# Patient Record
Sex: FEMALE | Race: WHITE | NOT HISPANIC OR LATINO | Employment: OTHER | URBAN - NONMETROPOLITAN AREA
[De-identification: names, ages, dates, MRNs, and addresses within clinical notes are randomized per-mention and may not be internally consistent; named-entity substitution may affect disease eponyms.]

---

## 2017-01-05 ENCOUNTER — OFFICE VISIT (OUTPATIENT)
Dept: INTERNAL MEDICINE | Facility: CLINIC | Age: 82
End: 2017-01-05

## 2017-01-05 VITALS
SYSTOLIC BLOOD PRESSURE: 122 MMHG | OXYGEN SATURATION: 97 % | HEART RATE: 68 BPM | DIASTOLIC BLOOD PRESSURE: 80 MMHG | WEIGHT: 118.4 LBS | HEIGHT: 65 IN | BODY MASS INDEX: 19.73 KG/M2

## 2017-01-05 DIAGNOSIS — M54.9 CHRONIC BACK PAIN, UNSPECIFIED BACK LOCATION, UNSPECIFIED BACK PAIN LATERALITY: ICD-10-CM

## 2017-01-05 DIAGNOSIS — M15.9 PRIMARY OSTEOARTHRITIS INVOLVING MULTIPLE JOINTS: ICD-10-CM

## 2017-01-05 DIAGNOSIS — R68.2 DRY MOUTH: ICD-10-CM

## 2017-01-05 DIAGNOSIS — G89.29 CHRONIC BACK PAIN, UNSPECIFIED BACK LOCATION, UNSPECIFIED BACK PAIN LATERALITY: ICD-10-CM

## 2017-01-05 DIAGNOSIS — E03.8 OTHER SPECIFIED HYPOTHYROIDISM: Primary | ICD-10-CM

## 2017-01-05 PROCEDURE — 99213 OFFICE O/P EST LOW 20 MIN: CPT | Performed by: FAMILY MEDICINE

## 2017-01-05 RX ORDER — HYDROCODONE BITARTRATE AND ACETAMINOPHEN 5; 325 MG/1; MG/1
1 TABLET ORAL EVERY 8 HOURS PRN
Qty: 90 TABLET | Refills: 0 | Status: SHIPPED | OUTPATIENT
Start: 2017-01-05 | End: 2017-02-28 | Stop reason: SDUPTHER

## 2017-01-05 RX ORDER — LEVOTHYROXINE SODIUM 0.1 MG/1
100 TABLET ORAL DAILY
Qty: 30 TABLET | Refills: 5 | Status: SHIPPED | OUTPATIENT
Start: 2017-01-05 | End: 2017-08-17 | Stop reason: SDUPTHER

## 2017-01-05 RX ORDER — PAROXETINE HYDROCHLORIDE 20 MG/1
20 TABLET, FILM COATED ORAL DAILY
Qty: 30 TABLET | Refills: 5 | Status: SHIPPED | OUTPATIENT
Start: 2017-01-05 | End: 2017-09-15 | Stop reason: SDUPTHER

## 2017-01-05 NOTE — PROGRESS NOTES
"Follow up visit.   SUBJECTIVE: Deidre Bobby is a 89 y.o. female seen for a follow up visit;    Back pain: Pt is doing well. Living at [facility]. Independently showers and makes her bed. Having balance issues. Arthritis and back pain are unchanged, meds help with pain. Rarely uses zanaflex. One fall since last visit, mid-November, her feet became tangled and cut her leg on her walker during the fall. Her daughter treated the laceration with dressing changes and neosporin.    Pt is reporting her right great and second toe nails is discolored.  This started 3 weeks ago. ? If she hit her toe on something, she can't remember.       The following portions of the patient's history were reviewed and updated as appropriate: allergies, current medications, past family history, past medical history, past social history, past surgical history and problem list.    Review of Systems   Eyes: Positive for visual disturbance.   Musculoskeletal: Positive for arthralgias and gait problem.         OBJECTIVE:  Visit Vitals   • /80   • Pulse 68   • Ht 64.5\" (163.8 cm)   • Wt 118 lb 6.4 oz (53.7 kg)   • SpO2 97%   • BMI 20.01 kg/m2        Physical Exam   Constitutional: She is oriented to person, place, and time. She appears well-developed and well-nourished.   Cardiovascular: Normal rate, regular rhythm, normal heart sounds and intact distal pulses.    Pulmonary/Chest: Effort normal and breath sounds normal.   Neurological: She is alert and oriented to person, place, and time.   Skin: Skin is warm and dry.   Right great and second toe nails discolored, black, probably due to trauma.    Psychiatric: She has a normal mood and affect. Her behavior is normal. Judgment and thought content normal.           ASSESSMENT:   Diagnosis Plan   1. Other specified hypothyroidism  levothyroxine (SYNTHROID, LEVOTHROID) 100 MCG tablet   2. Dry mouth  Xylitol (XYLIMELTS) 500 MG disk   3. Chronic back pain, unspecified back location, unspecified " back pain laterality  HYDROcodone-acetaminophen (LORTAB) 5-325 MG per tablet   4. Primary osteoarthritis involving multiple joints  HYDROcodone-acetaminophen (LORTAB) 5-325 MG per tablet           Patient seen in conjunction with MINDA Haywood.  Pt history and exam were reviewed by me, and the treatment plan was either approved by made by me.  Entire note was reviewed and edited by me.

## 2017-01-05 NOTE — MR AVS SNAPSHOT
Deidre Bobby   1/5/2017 2:45 PM   Office Visit    Provider:  Nancy Morrison MD   Department:  [facility] PRIMARY CARE   Dept Phone:  [phone]                Your Full Care Plan              Today's Medication Changes          These changes are accurate as of: 1/5/17  4:10 PM.  If you have any questions, ask your nurse or doctor.               New Medication(s)Ordered:     Xylitol 500 MG disk   Commonly known as:  XYLIMELTS   Apply 1 drop to the mouth or throat 4 (Four) Times a Day As Needed (dry mouth).   Started by:  Nancy Morrison MD         Stop taking medication(s)listed here:     cefdinir 300 MG capsule   Commonly known as:  OMNICEF   Stopped by:  Nancy Morrison MD           ciprofloxacin 250 MG tablet   Commonly known as:  CIPRO   Stopped by:  Nancy Morrison MD                Where to Get Your Medications      These medications were sent to [id number] - [place], [place] - [address] [place] [id number] [phone] [place] [id number]     Phone:  [phone]     levothyroxine 100 MCG tablet    PARoxetine 20 MG tablet    Xylitol 500 MG disk         You can get these medications from any pharmacy     Bring a paper prescription for each of these medications     HYDROcodone-acetaminophen 5-325 MG per tablet                  Your Updated Medication List          This list is accurate as of: 1/5/17  4:10 PM.  Always use your most recent med list.                aspirin 81 MG tablet       carvedilol 3.125 MG tablet   Commonly known as:  COREG   Take 1 tablet by mouth 2 (Two) Times a Day With Meals.       erythromycin 5 MG/GM ophthalmic ointment   Commonly known as:  ROMYCIN       HYDROcodone-acetaminophen 5-325 MG per tablet   Commonly known as:  LORTAB   Take 1 tablet by mouth Every 8 (Eight) Hours As Needed (arthritis pain).       levothyroxine 100 MCG tablet   Commonly known as:  SYNTHROID, LEVOTHROID   Take 1 tablet by mouth  Daily.       MULTIPLE VITAMINS PO       NITROSTAT 0.4 MG SL tablet   Generic drug:  nitroglycerin       PARoxetine 20 MG tablet   Commonly known as:  PAXIL   Take 1 tablet by mouth Daily.       PROBIOTIC DAILY PO       RA VITAMIN B-12 1000 MCG tablet controlled-release   Generic drug:  Cyanocobalamin ER       tiZANidine 2 MG tablet   Commonly known as:  ZANAFLEX       Xylitol 500 MG disk   Commonly known as:  XYLIMELTS   Apply 1 drop to the mouth or throat 4 (Four) Times a Day As Needed (dry mouth).               You Were Diagnosed With        Codes Comments    Other specified hypothyroidism    -  Primary ICD-10-CM: E03.8  ICD-9-CM: 244.8     Dry mouth     ICD-10-CM: R68.2  ICD-9-CM: 527.7     Chronic back pain, unspecified back location, unspecified back pain laterality     ICD-10-CM: M54.9, G89.29  ICD-9-CM: 724.5, 338.29     Primary osteoarthritis involving multiple joints     ICD-10-CM: M15.0  ICD-9-CM: 715.09       Instructions     None    Patient Instructions History      Alt[employer] Signup     Our records indicate that you have an active [employer] account.    You can view your After Visit Summary by going to PagPop and logging in with your Kno username and password.  If you don't have a Kno username and password but a parent or guardian has access to your record, the parent or guardian should login with their own Kno username and password and access your record to view the After Visit Summary.    If you have questions, you can email [email or web address] or call [phone] to talk to our Kno staff.  Remember, Kno is NOT to be used for urgent needs.  For medical emergencies, dial 911.               Other Info from Your Visit           Allergies     Codeine      Penicillins      Sulfa Antibiotics        Reason for Visit     Hypothyroidism     Med Management     Med Refill     Arthritis     Hyperlipidemia     Back Pain     Congestive Heart Failure          "  Vital Signs     Blood Pressure Pulse Height Weight Oxygen Saturation Body Mass Index    122/80 68 64.5\" (163.8 cm) 118 lb 6.4 oz (53.7 kg) 97% 20.01 kg/m2    Smoking Status                   Never Smoker           Problems and Diagnoses Noted     Chronic back pain    Underactive thyroid    Primary osteoarthritis involving multiple joints    Dry mouth          "

## 2017-02-14 ENCOUNTER — TELEPHONE (OUTPATIENT)
Dept: INTERNAL MEDICINE | Facility: CLINIC | Age: 82
End: 2017-02-14

## 2017-02-14 NOTE — TELEPHONE ENCOUNTER
Spoke with Gaviota, pt is really just c/o frequency. Gaviota said pt did lose her keys yesterday and was more upset than she should have been and cried, Gaviota says she never cries, so that was unusual for her. She will stop by later and  urine specimen cup and bring it back to be sent for culture. She did say pt has been c/o more reflux and abd burning lately. Advised her this is not UTI related, but if she concerned about it, please call back and we'd get her in. She will speak with pt and if she feels she needs to be seen, they will call and schedule appt.

## 2017-02-14 NOTE — TELEPHONE ENCOUNTER
----- Message from Nancy Morrison MD sent at 2/13/2017  6:14 PM EST -----  Contact: Gaviota Marshall Grand-daughter   Unfortunately I can't just send something in. She had a urine that was resistant to antibiotics in the past, so if I give her the wrong one, we won't know which would be the right one.  She can come in and give us a urine sample, then I can culture it and treat her.  Is she having any other symptoms besides the frequency? Confusion, fatigue, nausea, balance issues? There are other things that could cause the increased frequency.      ----- Message -----     From: Ester Back MA     Sent: 2/13/2017   3:59 PM       To: Nancy Morrison MD        ----- Message -----     From: Monserrat Zuniga     Sent: 2/13/2017  10:37 AM       To: Ester Back MA    Gaviota called the office requesting to see if Dr. Morrison could send her something in for a UTI. She said that she is having frequency urination but not able to urinate much. She said that there is not burning at the moment. Gaviota was wanting to see if she could just send her something in or does she need to bring her in. If approved she would like [facility] sent to the  [facility] in [place].

## 2017-02-15 ENCOUNTER — LAB (OUTPATIENT)
Dept: INTERNAL MEDICINE | Facility: CLINIC | Age: 82
End: 2017-02-15

## 2017-02-15 ENCOUNTER — TELEPHONE (OUTPATIENT)
Dept: INTERNAL MEDICINE | Facility: CLINIC | Age: 82
End: 2017-02-15

## 2017-02-15 DIAGNOSIS — R39.9 URINARY SYMPTOM OR SIGN: ICD-10-CM

## 2017-02-15 DIAGNOSIS — R30.0 DYSURIA: ICD-10-CM

## 2017-02-15 DIAGNOSIS — R39.9 URINARY SYMPTOM OR SIGN: Primary | ICD-10-CM

## 2017-02-15 LAB
BILIRUB BLD-MCNC: ABNORMAL MG/DL
CLARITY, POC: ABNORMAL
COLOR UR: YELLOW
GLUCOSE UR STRIP-MCNC: NEGATIVE MG/DL
KETONES UR QL: NEGATIVE
LEUKOCYTE EST, POC: ABNORMAL
NITRITE UR-MCNC: NEGATIVE MG/ML
PH UR: 6 [PH] (ref 5–8)
PROT UR STRIP-MCNC: NEGATIVE MG/DL
RBC # UR STRIP: ABNORMAL /UL
SP GR UR: 1.02 (ref 1–1.03)
UROBILINOGEN UR QL: NORMAL

## 2017-02-15 PROCEDURE — 81003 URINALYSIS AUTO W/O SCOPE: CPT | Performed by: FAMILY MEDICINE

## 2017-02-15 PROCEDURE — 87086 URINE CULTURE/COLONY COUNT: CPT | Performed by: FAMILY MEDICINE

## 2017-02-15 PROCEDURE — 87186 SC STD MICRODIL/AGAR DIL: CPT | Performed by: FAMILY MEDICINE

## 2017-02-15 PROCEDURE — 87077 CULTURE AEROBIC IDENTIFY: CPT | Performed by: FAMILY MEDICINE

## 2017-02-15 RX ORDER — NITROFURANTOIN 25; 75 MG/1; MG/1
100 CAPSULE ORAL 2 TIMES DAILY
Qty: 20 CAPSULE | Refills: 0 | Status: SHIPPED | OUTPATIENT
Start: 2017-02-15 | End: 2017-03-20 | Stop reason: SDUPTHER

## 2017-02-15 NOTE — TELEPHONE ENCOUNTER
----- Message from Ester Back MA sent at 2/15/2017 12:51 PM EST -----      ----- Message -----     From: Nancy Morrison MD     Sent: 2/15/2017  12:43 PM       To: Ester Back MA    Please call and let them know I sent in Macrobid.  Also, ask if she has had cephalosporins before, if the macrobid doesn't work we will need to switch her to something and that's about our only option for her.

## 2017-02-16 ENCOUNTER — OFFICE VISIT (OUTPATIENT)
Dept: INTERNAL MEDICINE | Facility: CLINIC | Age: 82
End: 2017-02-16

## 2017-02-16 VITALS
SYSTOLIC BLOOD PRESSURE: 196 MMHG | BODY MASS INDEX: 19.33 KG/M2 | HEIGHT: 65 IN | RESPIRATION RATE: 12 BRPM | OXYGEN SATURATION: 97 % | DIASTOLIC BLOOD PRESSURE: 100 MMHG | HEART RATE: 64 BPM | WEIGHT: 116 LBS

## 2017-02-16 DIAGNOSIS — R13.11 ORAL PHASE DYSPHAGIA: ICD-10-CM

## 2017-02-16 DIAGNOSIS — K21.00 GASTROESOPHAGEAL REFLUX DISEASE WITH ESOPHAGITIS: Primary | ICD-10-CM

## 2017-02-16 PROCEDURE — 99213 OFFICE O/P EST LOW 20 MIN: CPT | Performed by: FAMILY MEDICINE

## 2017-02-16 RX ORDER — PANTOPRAZOLE SODIUM 40 MG/1
40 TABLET, DELAYED RELEASE ORAL DAILY
Qty: 90 TABLET | Refills: 1 | Status: SHIPPED | OUTPATIENT
Start: 2017-02-16 | End: 2017-07-21 | Stop reason: SDUPTHER

## 2017-02-16 NOTE — PROGRESS NOTES
UTI, started antibx this am.   C/O burning and indigestion, feels like something is stuck in upper stomach. Hernia repair with mesh about 8 yrs ago.   C/O knots on right shin, she has hit her leg at home.   BP is elevated today.       SUBJECTIVE: Deidre Bobby is a 89 y.o. female seen for a follow up visit;    GERD  Paitent complains of heartburn. This has been associated with bilious reflux, choking on food, deep pressure at base of neck, difficulty swallowing, dysphagia, early satiety, heartburn, midespigastric pain and nocturnal burning.  She denies early satiety, hoarseness, symptoms primarily relate to meals, and lying down after meals and upper abdominal discomfort. Symptoms have been present for 4 weeks. But worsened a lot in the past few days after a steak meal Tuesday when a piece of steak got stuck.  She thinks it went down by Wednesday morning.  She has had dysphagia for solids. She has not lost weight. She denies melena, hematochezia, hematemesis, and coffee ground emesis. Medical therapy in the past has included H2 antagonists and started omeprazole OTC a month ago, still having to take something daily for acid.  Hernia repair with mesh about 8 yrs ago.     UTI: only started the macrobid - has tolerated omnicef in the past.      Shin pain: C/O knots on right shin, she has hit her leg at home.   Hypertension: BP is elevated today.  No headache, chest pain or chest pressure.  No new symptoms.         The following portions of the patient's history were reviewed and updated as appropriate: She  has a past medical history of Arthritis; Balance disorder; Congestive heart failure; GERD (gastroesophageal reflux disease); Hyperlipidemia; Macular degeneration; Myocardial infarction; and Vitamin B12 deficiency.  She has Chronic back pain on her pertinent problem list.  She  has a past surgical history that includes Cholecystectomy; Hemorrhoid surgery; Hysterectomy; Laparoscopy repair hiatal hernia (2008);  "Posterior Lumbar/Thoracic Spine Fusion; Skin cancer excision; Finger/Thumb Arthroplasty (Right); and Cataract extraction.  Her family history is not on file.  She  reports that she has never smoked. She has never used smokeless tobacco. She reports that she does not drink alcohol or use illicit drugs..    Review of Systems   Respiratory: Negative.    Cardiovascular: Negative.    Neurological: Negative.          OBJECTIVE:  Visit Vitals   • BP (!) 196/100   • Pulse 64   • Resp 12   • Ht 64.5\" (163.8 cm)   • Wt 116 lb (52.6 kg)   • SpO2 97%   • BMI 19.6 kg/m2        Physical Exam   Constitutional: She appears well-developed and well-nourished. No distress.   Skin:                ASSESSMENT:  1. Gastroesophageal reflux disease with esophagitis  worsening  - pantoprazole (PROTONIX) 40 MG EC tablet; Take 1 tablet by mouth Daily.  Dispense: 90 tablet; Refill: 1    2. Oral phase dysphagia  worsening  - pantoprazole (PROTONIX) 40 MG EC tablet; Take 1 tablet by mouth Daily.  Dispense: 90 tablet; Refill: 1      I, Nancy Morrison MD, hereby attest that the medical record entry above accurately reflects signatures/notations that I made in my capacity as Nancy Morrison MD when I treated and diagnosed the above patient.  I do hereby attest that his information is true, accurate, and complete to the best of my knowledge and I understand that any falsification, omission, or concealment of material fact may subject me to administrative, civil, or criminal liability.              "

## 2017-02-17 LAB — BACTERIA SPEC AEROBE CULT: ABNORMAL

## 2017-02-23 ENCOUNTER — TELEPHONE (OUTPATIENT)
Dept: INTERNAL MEDICINE | Facility: CLINIC | Age: 82
End: 2017-02-23

## 2017-02-23 NOTE — TELEPHONE ENCOUNTER
----- Message from Nancy Morrison MD sent at 2/22/2017 11:53 PM EST -----  Contact: PATIENTS [facility]  I certainly didn't advise them that to expect improvement this quickly.  It can take 2 weeks to heal damage done from acid, I would though recommend they take some ranitidine 150 mg twice a day (OTC is fine) as needed when she is having any burning acid pain.        ----- Message -----     From: Tona Foote MA     Sent: 2/22/2017   2:16 PM       To: Nancy Morrison MD        ----- Message -----     From: Renetta Hill     Sent: 2/22/2017  11:36 AM       To: Ester Back MA    [facility] recently prescribed for the patient is not doing any good. Still having issues swallowing, feels like something is in her throat, and experiencing burning in her throat. Would like to know what the next step should be, if she needs to be seen again, do a barium swallow test? Please call patient to discuss. Thank you.

## 2017-02-28 DIAGNOSIS — M54.9 CHRONIC BACK PAIN, UNSPECIFIED BACK LOCATION, UNSPECIFIED BACK PAIN LATERALITY: ICD-10-CM

## 2017-02-28 DIAGNOSIS — G89.29 CHRONIC BACK PAIN, UNSPECIFIED BACK LOCATION, UNSPECIFIED BACK PAIN LATERALITY: ICD-10-CM

## 2017-02-28 DIAGNOSIS — M15.9 PRIMARY OSTEOARTHRITIS INVOLVING MULTIPLE JOINTS: ICD-10-CM

## 2017-02-28 RX ORDER — HYDROCODONE BITARTRATE AND ACETAMINOPHEN 5; 325 MG/1; MG/1
1 TABLET ORAL EVERY 8 HOURS PRN
Qty: 90 TABLET | Refills: 0 | Status: SHIPPED | OUTPATIENT
Start: 2017-02-28 | End: 2017-03-27 | Stop reason: SDUPTHER

## 2017-03-15 ENCOUNTER — TELEPHONE (OUTPATIENT)
Dept: INTERNAL MEDICINE | Facility: CLINIC | Age: 82
End: 2017-03-15

## 2017-03-15 DIAGNOSIS — R30.0 DYSURIA: Primary | ICD-10-CM

## 2017-03-15 NOTE — TELEPHONE ENCOUNTER
----- Message from Ester Back MA sent at 3/15/2017 11:33 AM EDT -----  Contact: dain das granddaughter       ----- Message -----     From: Monserrat Zuniga     Sent: 3/15/2017  10:55 AM       To: Ester Back MA    Gaviota called the office stating that she has symptoms of a UTI since the weekend. She is wanting to see if Dr Morrison could put in a an order for her to have a U/A done and she can swing by and gather a cup etc to take home and collect a sample to bring back and have tested. She would like a call back when you get a chance.

## 2017-03-17 DIAGNOSIS — R30.0 DYSURIA: ICD-10-CM

## 2017-03-17 PROCEDURE — 87077 CULTURE AEROBIC IDENTIFY: CPT | Performed by: FAMILY MEDICINE

## 2017-03-17 PROCEDURE — 87186 SC STD MICRODIL/AGAR DIL: CPT | Performed by: FAMILY MEDICINE

## 2017-03-17 PROCEDURE — 87086 URINE CULTURE/COLONY COUNT: CPT | Performed by: FAMILY MEDICINE

## 2017-03-19 LAB — BACTERIA SPEC AEROBE CULT: ABNORMAL

## 2017-03-20 ENCOUNTER — TELEPHONE (OUTPATIENT)
Dept: INTERNAL MEDICINE | Facility: CLINIC | Age: 82
End: 2017-03-20

## 2017-03-20 RX ORDER — NITROFURANTOIN 25; 75 MG/1; MG/1
100 CAPSULE ORAL 2 TIMES DAILY
Qty: 20 CAPSULE | Refills: 0 | Status: SHIPPED | OUTPATIENT
Start: 2017-03-20 | End: 2017-04-06

## 2017-03-20 NOTE — TELEPHONE ENCOUNTER
----- Message from Nancy Morrison MD sent at 3/20/2017  8:44 AM EDT -----  Can you let her know I sent in macrobid

## 2017-03-27 DIAGNOSIS — M15.9 PRIMARY OSTEOARTHRITIS INVOLVING MULTIPLE JOINTS: ICD-10-CM

## 2017-03-27 DIAGNOSIS — M54.9 CHRONIC BACK PAIN, UNSPECIFIED BACK LOCATION, UNSPECIFIED BACK PAIN LATERALITY: ICD-10-CM

## 2017-03-27 DIAGNOSIS — G89.29 CHRONIC BACK PAIN, UNSPECIFIED BACK LOCATION, UNSPECIFIED BACK PAIN LATERALITY: ICD-10-CM

## 2017-03-27 RX ORDER — HYDROCODONE BITARTRATE AND ACETAMINOPHEN 5; 325 MG/1; MG/1
1 TABLET ORAL EVERY 8 HOURS PRN
Qty: 90 TABLET | Refills: 0 | Status: SHIPPED | OUTPATIENT
Start: 2017-03-27 | End: 2017-04-06 | Stop reason: SDUPTHER

## 2017-04-06 ENCOUNTER — OFFICE VISIT (OUTPATIENT)
Dept: INTERNAL MEDICINE | Facility: CLINIC | Age: 82
End: 2017-04-06

## 2017-04-06 VITALS
TEMPERATURE: 98.6 F | OXYGEN SATURATION: 97 % | WEIGHT: 114 LBS | SYSTOLIC BLOOD PRESSURE: 142 MMHG | BODY MASS INDEX: 18.99 KG/M2 | HEART RATE: 63 BPM | DIASTOLIC BLOOD PRESSURE: 86 MMHG | HEIGHT: 65 IN

## 2017-04-06 DIAGNOSIS — M15.9 PRIMARY OSTEOARTHRITIS INVOLVING MULTIPLE JOINTS: ICD-10-CM

## 2017-04-06 DIAGNOSIS — N30.10 INTERSTITIAL CYSTITIS: Primary | ICD-10-CM

## 2017-04-06 DIAGNOSIS — M54.9 CHRONIC BACK PAIN, UNSPECIFIED BACK LOCATION, UNSPECIFIED BACK PAIN LATERALITY: ICD-10-CM

## 2017-04-06 DIAGNOSIS — G89.29 CHRONIC BACK PAIN, UNSPECIFIED BACK LOCATION, UNSPECIFIED BACK PAIN LATERALITY: ICD-10-CM

## 2017-04-06 PROBLEM — I50.22 CHRONIC SYSTOLIC CONGESTIVE HEART FAILURE (HCC): Status: ACTIVE | Noted: 2017-04-06

## 2017-04-06 PROCEDURE — G0439 PPPS, SUBSEQ VISIT: HCPCS | Performed by: FAMILY MEDICINE

## 2017-04-06 RX ORDER — RANITIDINE 150 MG/1
150 TABLET ORAL 2 TIMES DAILY PRN
COMMUNITY
End: 2018-01-01 | Stop reason: HOSPADM

## 2017-04-06 RX ORDER — HYDROCODONE BITARTRATE AND ACETAMINOPHEN 5; 325 MG/1; MG/1
1 TABLET ORAL EVERY 8 HOURS PRN
Qty: 90 TABLET | Refills: 0 | Status: SHIPPED | OUTPATIENT
Start: 2017-04-06 | End: 2017-05-31 | Stop reason: SDUPTHER

## 2017-04-06 NOTE — PROGRESS NOTES
QUICK REFERENCE INFORMATION:  The ABCs of the Annual Wellness Visit    Subsequent Medicare Wellness Visit    Subjective   History of Present Illness    Deidre Bobby is a 89 y.o. female who presents for an Subsequent Wellness Visit. In addition, we addressed the following health issues:    GERD: Doing better, having to take the zantac sometimes as needed.      IC: has done well on elmiron in past    HEALTH RISK ASSESSMENT    Recent Hospitalizations:  No recent hospitalization(s)..        Current Medical Providers:  Patient Care Team:  Nancy Morrison MD as PCP - General  Nancy Morrison MD as PCP - Family Medicine        Smoking Status:  History   Smoking Status   • Never Smoker   Smokeless Tobacco   • Never Used       Alcohol Consumption:  History   Alcohol Use No       Depression Screen:   PHQ-9 Depression Screening 4/6/2017   Little interest or pleasure in doing things 0   Feeling down, depressed, or hopeless 0   Trouble falling or staying asleep, or sleeping too much 0   Feeling tired or having little energy 0   Poor appetite or overeating 0   Feeling bad about yourself - or that you are a failure or have let yourself or your family down 0   Trouble concentrating on things, such as reading the newspaper or watching television 0   Moving or speaking so slowly that other people could have noticed. Or the opposite - being so fidgety or restless that you have been moving around a lot more than usual 0   Thoughts that you would be better off dead, or of hurting yourself in some way 0   PHQ-9 Total Score 0   If you checked off any problems, how difficult have these problems made it for you to do your work, take care of things at home, or get along with other people? Not difficult at all       Health Habits and Functional and Cognitive Screening:  Functional & Cognitive Status 4/6/2017   Do you have difficulty preparing food and eating? Yes   Do you have difficulty bathing yourself? Yes   Do you have difficulty  getting dressed? Yes   Do you have difficulty using the toilet? No   Do you have difficulty moving around from place to place? Yes   In the past year have you fallen or experienced a near fall? Yes   Do you need help using the phone?  Yes   Are you deaf or do you have serious difficulty hearing?  Yes   Do you need help with transportation? Yes   Do you need help shopping? Yes   Do you need help preparing meals?  Yes   Do you need help with housework?  Yes   Do you need help with laundry? Yes   Do you need help taking your medications? Yes   Do you need help managing money? Yes       Health Habits  Current Diet: Frequent Junk Food  Dental Exam: Up to date  Eye Exam: Up to date  Exercise (times per week): 7 times per week  Current Exercise Activities Include: Aerobics (SIT DOWN EXERCISE)      Does the patient have evidence of cognitive impairment? Yes    Asprin use counseling:not applicable    Finger Rub Hearing Test (right ear):borderline  Finger Rub Hearing Test (left ear):borderline    Recent Lab Results:  CMP:  Lab Results   Component Value Date    BUN 21 06/17/2016    CREATININE 1.00 06/17/2016    EGFRIFNONA 52 (L) 06/17/2016    BCR 21.0 06/17/2016     06/17/2016    K 4.2 06/17/2016    CO2 30.0 06/17/2016    CALCIUM 10.0 06/17/2016     Lipid Panel:     LDL:     HbA1c:     Urine Microalbumin:     Visual Acuity:  No exam data present    Age-appropriate Screening Schedule:  Refer to the list below for future screening recommendations based on patient's age, sex and/or medical conditions. Orders for these recommended tests are listed in the plan section. The patient has been provided with a written plan.    Health Maintenance   Topic Date Due   • TDAP/TD VACCINES (1 - Tdap) 02/14/1944   • PNEUMOCOCCAL VACCINES (65+ LOW/MEDIUM RISK) (2 of 2 - PPSV23) 09/23/2017   • INFLUENZA VACCINE  Completed   • MAMMOGRAM  Excluded   • LIPID PANEL  Excluded   • ZOSTER VACCINE  Excluded          The following portions of the  patient's history were reviewed and updated as appropriate: She  has a past medical history of Arthritis; Balance disorder; Congestive heart failure; GERD (gastroesophageal reflux disease); Hyperlipidemia; Macular degeneration; Myocardial infarction (2009); and Vitamin B12 deficiency.  She has Chronic back pain on her pertinent problem list.  She  has a past surgical history that includes Cholecystectomy; Hemorrhoid surgery; Hysterectomy; Laparoscopy repair hiatal hernia (2008); Posterior Lumbar/Thoracic Spine Fusion; Skin cancer excision; Finger/Thumb Arthroplasty (Right); and Cataract extraction.  Her family history is not on file.  She  reports that she has never smoked. She has never used smokeless tobacco. She reports that she does not drink alcohol or use illicit drugs..    Outpatient Medications Prior to Visit   Medication Sig Dispense Refill   • aspirin 81 MG tablet Take  by mouth daily.     • carvedilol (COREG) 3.125 MG tablet Take 1 tablet by mouth 2 (Two) Times a Day With Meals. 60 tablet 5   • Cyanocobalamin ER (RA VITAMIN B-12) 1000 MCG tablet controlled-release Take  by mouth daily.     • erythromycin (ROMYCIN) 5 MG/GM ophthalmic ointment      • levothyroxine (SYNTHROID, LEVOTHROID) 100 MCG tablet Take 1 tablet by mouth Daily. 30 tablet 5   • MULTIPLE VITAMINS PO Take  by mouth daily.     • pantoprazole (PROTONIX) 40 MG EC tablet Take 1 tablet by mouth Daily. 90 tablet 1   • PARoxetine (PAXIL) 20 MG tablet Take 1 tablet by mouth Daily. 30 tablet 5   • Probiotic Product (PROBIOTIC DAILY PO) Take  by mouth.     • HYDROcodone-acetaminophen (LORTAB) 5-325 MG per tablet Take 1 tablet by mouth Every 8 (Eight) Hours As Needed (arthritis pain). 90 tablet 0   • tiZANidine (ZANAFLEX) 2 MG tablet Take 1 tablet by mouth 3 (three) times a day as needed.     • nitrofurantoin, macrocrystal-monohydrate, (MACROBID) 100 MG capsule Take 1 capsule by mouth 2 (Two) Times a Day. 20 capsule 0   • nitroglycerin (NITROSTAT)  0.4 MG SL tablet Place  under the tongue.       No facility-administered medications prior to visit.        Patient Active Problem List   Diagnosis   • Primary osteoarthritis involving multiple joints   • Impairment of balance   • Chronic diastolic congestive heart failure   • Cardiac disease   • Hypercholesterolemia   • History of myocardial infarction   • Macular degeneration   • Hypothyroidism   • Cobalamin deficiency   • Chronic back pain       Identification of Risk Factors:  Risk factors include: cardiovascular risk, increased fall risk, chronic pain, cognitive impairment, vision limitations, hearing limitations and polypharmacy.    Review of Systems   Constitutional: Positive for fatigue. Negative for chills and fever.   HENT: Positive for hearing loss.    Eyes: Positive for visual disturbance.   Respiratory: Negative.    Cardiovascular: Negative.    Gastrointestinal: Positive for constipation.   Endocrine: Negative.    Musculoskeletal: Positive for arthralgias, back pain and gait problem.   Neurological: Positive for tremors, weakness and light-headedness.   Psychiatric/Behavioral: Positive for sleep disturbance.       Compared to one year ago, the patient feels her physical health is the same.  Compared to one year ago, the patient feels her mental health is the same.    Objective     Physical Exam   Constitutional: Vital signs are normal. She appears cachectic.   frail   HENT:   Head: Normocephalic and atraumatic.   Right Ear: External ear normal.   Left Ear: External ear normal.   Mouth/Throat: Oropharynx is clear and moist.   Neck: Normal range of motion. Neck supple.   Cardiovascular: Normal rate and regular rhythm.    Pulmonary/Chest: Effort normal and breath sounds normal. No respiratory distress.   Abdominal: Soft.   Neurological: She is alert.   Skin: Skin is warm.   Psychiatric: She has a normal mood and affect. Her behavior is normal. She exhibits abnormal recent memory.       Vitals:    04/06/17  "1452   BP: 142/86   Pulse: 63   Temp: 98.6 °F (37 °C)   SpO2: 97%   Weight: 114 lb (51.7 kg)   Height: 64.5\" (163.8 cm)       Body mass index is 19.27 kg/(m^2).  Discussed the patient's BMI with her. The BMI is below average; no BMI management plan is appropriate.    Assessment/Plan   Patient Self-Management and Personalized Health Advice  The patient has been provided with information about: fall prevention and designing advance directives and preventive services including:   · Advanced directives: power of  for healthcare on file, has an advanced directive - a copy has been provided and is in file, Fall Risk assessment done.    Visit Diagnoses:    ICD-10-CM ICD-9-CM   1. Interstitial cystitis N30.10 595.1   2. Chronic back pain, unspecified back location, unspecified back pain laterality M54.9 724.5    G89.29 338.29   3. Primary osteoarthritis involving multiple joints M15.0 715.09       No orders of the defined types were placed in this encounter.      Outpatient Encounter Prescriptions as of 4/6/2017   Medication Sig Dispense Refill   • aspirin 81 MG tablet Take  by mouth daily.     • carvedilol (COREG) 3.125 MG tablet Take 1 tablet by mouth 2 (Two) Times a Day With Meals. 60 tablet 5   • Cyanocobalamin ER (RA VITAMIN B-12) 1000 MCG tablet controlled-release Take  by mouth daily.     • erythromycin (ROMYCIN) 5 MG/GM ophthalmic ointment      • HYDROcodone-acetaminophen (LORTAB) 5-325 MG per tablet Take 1 tablet by mouth Every 8 (Eight) Hours As Needed (arthritis pain). 90 tablet 0   • levothyroxine (SYNTHROID, LEVOTHROID) 100 MCG tablet Take 1 tablet by mouth Daily. 30 tablet 5   • MULTIPLE VITAMINS PO Take  by mouth daily.     • pantoprazole (PROTONIX) 40 MG EC tablet Take 1 tablet by mouth Daily. 90 tablet 1   • PARoxetine (PAXIL) 20 MG tablet Take 1 tablet by mouth Daily. 30 tablet 5   • Probiotic Product (PROBIOTIC DAILY PO) Take  by mouth.     • raNITIdine (ZANTAC) 150 MG tablet Take 150 mg by mouth " 2 (Two) Times a Day As Needed.     • [DISCONTINUED] HYDROcodone-acetaminophen (LORTAB) 5-325 MG per tablet Take 1 tablet by mouth Every 8 (Eight) Hours As Needed (arthritis pain). 90 tablet 0   • [DISCONTINUED] tiZANidine (ZANAFLEX) 2 MG tablet Take 1 tablet by mouth 3 (three) times a day as needed.     • pentosan polysulfate (ELMIRON) 100 MG capsule Take 1 capsule by mouth 2 (Two) Times a Day With Meals. 60 capsule 2   • [DISCONTINUED] nitrofurantoin, macrocrystal-monohydrate, (MACROBID) 100 MG capsule Take 1 capsule by mouth 2 (Two) Times a Day. 20 capsule 0   • [DISCONTINUED] nitroglycerin (NITROSTAT) 0.4 MG SL tablet Place  under the tongue.       No facility-administered encounter medications on file as of 4/6/2017.        Reviewed use of high risk medication in the elderly: yes  Reviewed for potential of harmful drug interactions in the elderly: yes    Follow Up:  Return in about 3 months (around 7/6/2017).     An After Visit Summary and PPPS with all of these plans were given to the patient.            I, Nancy Morrison MD, hereby attest that the medical record entry above accurately reflects signatures/notations that I made in my capacity as Nancy Morrison MD when I treated and diagnosed the above patient.  I do hereby attest that his information is true, accurate, and complete to the best of my knowledge and I understand that any falsification, omission, or concealment of material fact may subject me to administrative, civil, or criminal liability.

## 2017-04-06 NOTE — PATIENT INSTRUCTIONS
Medicare Wellness  Personal Prevention Plan of Service     Date of Office Visit:  2017  Encounter Provider:  Nancy Morrison MD  Place of Service:  [facility]  Patient Name: Deidre Bobby  :  1925    As part of the Medicare Wellness portion of your visit today, we are providing you with this personalized preventive plan of services (PPPS). This plan is based upon recommendations of the [place] Preventive Services Task Force (USPSTF) and the Advisory Committee on Immunization Practices (ACIP).    This lists the preventive care services that should be considered, and provides dates of when you are due. Items listed as completed are up-to-date and do not require any further intervention.    Health Maintenance   Topic Date Due   • TDAP/TD VACCINES (1 - Tdap) 1944   • PNEUMOCOCCAL VACCINES (65+ LOW/MEDIUM RISK) (2 of 2 - PPSV23) 2017   • MEDICARE ANNUAL WELLNESS  2018   • INFLUENZA VACCINE  Completed   • MAMMOGRAM  Excluded   • LIPID PANEL  Excluded   • ZOSTER VACCINE  Excluded       No orders of the defined types were placed in this encounter.      Return in about 3 months (around 2017).

## 2017-05-22 RX ORDER — CARVEDILOL 3.12 MG/1
TABLET ORAL
Qty: 60 TABLET | Refills: 5 | Status: SHIPPED | OUTPATIENT
Start: 2017-05-22 | End: 2017-10-20 | Stop reason: SDUPTHER

## 2017-05-31 ENCOUNTER — TELEPHONE (OUTPATIENT)
Dept: INTERNAL MEDICINE | Facility: CLINIC | Age: 82
End: 2017-05-31

## 2017-05-31 DIAGNOSIS — G89.29 CHRONIC BACK PAIN, UNSPECIFIED BACK LOCATION, UNSPECIFIED BACK PAIN LATERALITY: ICD-10-CM

## 2017-05-31 DIAGNOSIS — M15.9 PRIMARY OSTEOARTHRITIS INVOLVING MULTIPLE JOINTS: ICD-10-CM

## 2017-05-31 DIAGNOSIS — M54.9 CHRONIC BACK PAIN, UNSPECIFIED BACK LOCATION, UNSPECIFIED BACK PAIN LATERALITY: ICD-10-CM

## 2017-06-01 RX ORDER — HYDROCODONE BITARTRATE AND ACETAMINOPHEN 5; 325 MG/1; MG/1
1 TABLET ORAL EVERY 8 HOURS PRN
Qty: 90 TABLET | Refills: 0 | Status: SHIPPED | OUTPATIENT
Start: 2017-06-01 | End: 2017-06-26 | Stop reason: SDUPTHER

## 2017-06-09 DIAGNOSIS — N30.10 INTERSTITIAL CYSTITIS: ICD-10-CM

## 2017-06-09 RX ORDER — PENTOSAN POLYSULFATE SODIUM 100 MG/1
CAPSULE, GELATIN COATED ORAL
Qty: 60 CAPSULE | Refills: 2 | Status: SHIPPED | OUTPATIENT
Start: 2017-06-09 | End: 2017-07-13 | Stop reason: SDUPTHER

## 2017-06-26 DIAGNOSIS — G89.29 CHRONIC BACK PAIN, UNSPECIFIED BACK LOCATION, UNSPECIFIED BACK PAIN LATERALITY: ICD-10-CM

## 2017-06-26 DIAGNOSIS — M15.9 PRIMARY OSTEOARTHRITIS INVOLVING MULTIPLE JOINTS: ICD-10-CM

## 2017-06-26 DIAGNOSIS — M54.9 CHRONIC BACK PAIN, UNSPECIFIED BACK LOCATION, UNSPECIFIED BACK PAIN LATERALITY: ICD-10-CM

## 2017-06-26 NOTE — TELEPHONE ENCOUNTER
Patient's granddaughter/POA Gaviota is calling to see if Dr. Morrison is willing to write Deidre her pain med prescription a little early. Gaviota is going on vacation, and the pain med isn't due to be refilled until she will already be on vacation.    She was wondering if Dr. Morrison can write it, Gaviota can take it to the pharmacy to get filled, and when it's ready, they can deliver it to Deidre.    She'd like a callback.

## 2017-06-28 RX ORDER — HYDROCODONE BITARTRATE AND ACETAMINOPHEN 5; 325 MG/1; MG/1
1 TABLET ORAL EVERY 8 HOURS PRN
Qty: 90 TABLET | Refills: 0 | Status: SHIPPED | OUTPATIENT
Start: 2017-06-28 | End: 2017-07-13 | Stop reason: SDUPTHER

## 2017-06-28 NOTE — TELEPHONE ENCOUNTER
Will you address, Dr Morrison is out this afternoon. Last filled 06/02/2017 but daughter is going out of town early tomorrow am.

## 2017-07-13 ENCOUNTER — OFFICE VISIT (OUTPATIENT)
Dept: INTERNAL MEDICINE | Facility: CLINIC | Age: 82
End: 2017-07-13

## 2017-07-13 VITALS
HEART RATE: 63 BPM | DIASTOLIC BLOOD PRESSURE: 80 MMHG | SYSTOLIC BLOOD PRESSURE: 122 MMHG | OXYGEN SATURATION: 96 % | BODY MASS INDEX: 18.83 KG/M2 | HEIGHT: 65 IN | WEIGHT: 113 LBS | RESPIRATION RATE: 12 BRPM

## 2017-07-13 DIAGNOSIS — R53.83 OTHER FATIGUE: ICD-10-CM

## 2017-07-13 DIAGNOSIS — E78.00 HYPERCHOLESTEROLEMIA: ICD-10-CM

## 2017-07-13 DIAGNOSIS — M54.9 CHRONIC BACK PAIN, UNSPECIFIED BACK LOCATION, UNSPECIFIED BACK PAIN LATERALITY: ICD-10-CM

## 2017-07-13 DIAGNOSIS — R63.8 OTHER SYMPTOMS AND SIGNS CONCERNING FOOD AND FLUID INTAKE: ICD-10-CM

## 2017-07-13 DIAGNOSIS — R53.1 WEAKNESS: ICD-10-CM

## 2017-07-13 DIAGNOSIS — E53.8 COBALAMIN DEFICIENCY: ICD-10-CM

## 2017-07-13 DIAGNOSIS — E03.4 HYPOTHYROIDISM DUE TO ACQUIRED ATROPHY OF THYROID: Primary | ICD-10-CM

## 2017-07-13 DIAGNOSIS — M15.9 PRIMARY OSTEOARTHRITIS INVOLVING MULTIPLE JOINTS: ICD-10-CM

## 2017-07-13 DIAGNOSIS — N30.10 INTERSTITIAL CYSTITIS: ICD-10-CM

## 2017-07-13 DIAGNOSIS — I50.32 CHRONIC DIASTOLIC CONGESTIVE HEART FAILURE (HCC): ICD-10-CM

## 2017-07-13 DIAGNOSIS — G89.29 CHRONIC BACK PAIN, UNSPECIFIED BACK LOCATION, UNSPECIFIED BACK PAIN LATERALITY: ICD-10-CM

## 2017-07-13 PROCEDURE — 99213 OFFICE O/P EST LOW 20 MIN: CPT | Performed by: FAMILY MEDICINE

## 2017-07-13 RX ORDER — HYDROCODONE BITARTRATE AND ACETAMINOPHEN 5; 325 MG/1; MG/1
1 TABLET ORAL EVERY 8 HOURS PRN
Qty: 90 TABLET | Refills: 0 | Status: SHIPPED | OUTPATIENT
Start: 2017-07-13 | End: 2017-08-17 | Stop reason: SDUPTHER

## 2017-07-13 NOTE — PROGRESS NOTES
"Follow up visit. C/O hoarse over past several days. C/O increased fatigue.   She has been on Elmiron x 3 months. C/O incontinence, urgency, painful urination, lots of pressure.     SUBJECTIVE: Deidre Bobby is a 89 y.o. female seen for a follow up visit;      IC: the elmiron hasn't helped, she still c/o urinary urgency and spasms.  Pain w/ urination.  She has also noticed more dizziness w/ standing than she had been having, can't walk due to unsteadiness.      Malaise: feeling more poorly, exhausted, fatigued.  Has been going on for a few weeks at least.  No change in medications or activity, except for elmiron.           The following portions of the patient's history were reviewed and updated as appropriate: current medications and problem list.    Review of Systems   Constitutional: Positive for fatigue. Negative for chills and fever.   Genitourinary: Positive for frequency, pelvic pain and urgency. Negative for flank pain.   Neurological: Positive for dizziness, tremors and weakness.         OBJECTIVE:  /80  Pulse 63  Resp 12  Ht 64.5\" (163.8 cm)  Wt 113 lb (51.3 kg)  SpO2 96%  BMI 19.1 kg/m2     Physical Exam   Constitutional: She appears well-developed and well-nourished. No distress.   Psychiatric: She has a normal mood and affect. Her speech is normal and behavior is normal. Cognition and memory are normal.           ASSESSMENT:  1. Hypothyroidism due to acquired atrophy of thyroid  recheck  - TSH    2. Weakness  worsening  - Comprehensive Metabolic Panel  - TSH  - Urine Culture  - POC Urinalysis Dipstick, Automated; Future    3. Other fatigue  worsening  - CBC (No Diff)  - TSH    4. Hypercholesterolemia    - Comprehensive Metabolic Panel    5. Cobalamin deficiency    - Iron Profile  - Vitamin B12    6. Chronic diastolic congestive heart failure    - Comprehensive Metabolic Panel    7. Other symptoms and signs concerning food and fluid intake     - Iron Profile    8. Interstitial " cystitis  Stop elmiron  - Urine Culture  - POC Urinalysis Dipstick, Automated; Future    9. Chronic back pain, unspecified back location, unspecified back pain laterality    - HYDROcodone-acetaminophen (LORTAB) 5-325 MG per tablet; Take 1 tablet by mouth Every 8 (Eight) Hours As Needed (arthritis pain).  Dispense: 90 tablet; Refill: 0    10. Primary osteoarthritis involving multiple joints    - HYDROcodone-acetaminophen (LORTAB) 5-325 MG per tablet; Take 1 tablet by mouth Every 8 (Eight) Hours As Needed (arthritis pain).  Dispense: 90 tablet; Refill: 0

## 2017-07-15 LAB
ALBUMIN SERPL-MCNC: 4.5 G/DL (ref 3.5–5)
ALBUMIN/GLOB SERPL: 1.7 G/DL (ref 1–2)
ALP SERPL-CCNC: 114 U/L (ref 38–126)
ALT SERPL-CCNC: 28 U/L (ref 13–69)
AST SERPL-CCNC: 28 U/L (ref 15–46)
BACTERIA UR CULT: ABNORMAL
BACTERIA UR CULT: ABNORMAL
BILIRUB SERPL-MCNC: 0.4 MG/DL (ref 0.2–1.3)
BUN SERPL-MCNC: 11 MG/DL (ref 7–20)
BUN/CREAT SERPL: 12.2 (ref 7.1–23.5)
CALCIUM SERPL-MCNC: 10 MG/DL (ref 8.4–10.2)
CHLORIDE SERPL-SCNC: 92 MMOL/L (ref 98–107)
CO2 SERPL-SCNC: 25 MMOL/L (ref 26–30)
CREAT SERPL-MCNC: 0.9 MG/DL (ref 0.6–1.3)
ERYTHROCYTE [DISTWIDTH] IN BLOOD BY AUTOMATED COUNT: 12.2 % (ref 11.5–14.5)
GLOBULIN SER CALC-MCNC: 2.7 GM/DL
GLUCOSE SERPL-MCNC: 89 MG/DL (ref 74–98)
HCT VFR BLD AUTO: 37.3 % (ref 37–47)
HGB BLD-MCNC: 12 G/DL (ref 12–16)
IRON SATN MFR SERPL: 14 % (ref 11–46)
IRON SERPL-MCNC: 43 MCG/DL (ref 37–181)
MCH RBC QN AUTO: 30.1 PG (ref 27–31)
MCHC RBC AUTO-ENTMCNC: 32.2 G/DL (ref 30–37)
MCV RBC AUTO: 93.5 FL (ref 81–99)
OTHER ANTIBIOTIC SUSC ISLT: ABNORMAL
PLATELET # BLD AUTO: 384 10*3/MM3 (ref 130–400)
POTASSIUM SERPL-SCNC: 4 MMOL/L (ref 3.5–5.1)
PROT SERPL-MCNC: 7.2 G/DL (ref 6.3–8.2)
RBC # BLD AUTO: 3.99 10*6/MM3 (ref 4.2–5.4)
SODIUM SERPL-SCNC: 132 MMOL/L (ref 137–145)
TIBC SERPL-MCNC: 308 MCG/DL (ref 261–497)
TSH SERPL DL<=0.005 MIU/L-ACNC: 6.82 MIU/ML (ref 0.47–4.68)
UIBC SERPL-MCNC: 265 MCG/DL
VIT B12 SERPL-MCNC: >1000 PG/ML (ref 239–931)
WBC # BLD AUTO: 7 10*3/MM3 (ref 4.8–10.8)

## 2017-07-16 RX ORDER — CIPROFLOXACIN 500 MG/1
500 TABLET, FILM COATED ORAL 2 TIMES DAILY
Qty: 20 TABLET | Refills: 0 | Status: SHIPPED | OUTPATIENT
Start: 2017-07-16 | End: 2017-07-26

## 2017-07-17 ENCOUNTER — TELEPHONE (OUTPATIENT)
Dept: INTERNAL MEDICINE | Facility: CLINIC | Age: 82
End: 2017-07-17

## 2017-07-17 NOTE — TELEPHONE ENCOUNTER
----- Message from Nancy Morrison MD sent at 7/16/2017  3:13 PM EDT -----  She ended up growing bacteria - please call the granddaughter and make sure she knows abx got sent in.

## 2017-07-21 DIAGNOSIS — K21.00 GASTROESOPHAGEAL REFLUX DISEASE WITH ESOPHAGITIS: ICD-10-CM

## 2017-07-21 DIAGNOSIS — R13.11 ORAL PHASE DYSPHAGIA: ICD-10-CM

## 2017-07-21 RX ORDER — PANTOPRAZOLE SODIUM 40 MG/1
TABLET, DELAYED RELEASE ORAL
Qty: 90 TABLET | Refills: 1 | Status: SHIPPED | OUTPATIENT
Start: 2017-07-21 | End: 2018-01-05 | Stop reason: SDUPTHER

## 2017-08-17 ENCOUNTER — OFFICE VISIT (OUTPATIENT)
Dept: INTERNAL MEDICINE | Facility: CLINIC | Age: 82
End: 2017-08-17

## 2017-08-17 VITALS
HEIGHT: 66 IN | SYSTOLIC BLOOD PRESSURE: 138 MMHG | TEMPERATURE: 98 F | HEART RATE: 59 BPM | DIASTOLIC BLOOD PRESSURE: 70 MMHG | OXYGEN SATURATION: 96 %

## 2017-08-17 DIAGNOSIS — E03.8 OTHER SPECIFIED HYPOTHYROIDISM: Primary | ICD-10-CM

## 2017-08-17 DIAGNOSIS — N30.10 INTERSTITIAL CYSTITIS: ICD-10-CM

## 2017-08-17 DIAGNOSIS — M54.9 CHRONIC BACK PAIN, UNSPECIFIED BACK LOCATION, UNSPECIFIED BACK PAIN LATERALITY: ICD-10-CM

## 2017-08-17 DIAGNOSIS — R30.0 DYSURIA: ICD-10-CM

## 2017-08-17 DIAGNOSIS — G89.29 CHRONIC BACK PAIN, UNSPECIFIED BACK LOCATION, UNSPECIFIED BACK PAIN LATERALITY: ICD-10-CM

## 2017-08-17 DIAGNOSIS — R53.1 WEAKNESS: ICD-10-CM

## 2017-08-17 DIAGNOSIS — M15.9 PRIMARY OSTEOARTHRITIS INVOLVING MULTIPLE JOINTS: ICD-10-CM

## 2017-08-17 LAB
BILIRUB BLD-MCNC: NEGATIVE MG/DL
CLARITY, POC: CLEAR
COLOR UR: YELLOW
GLUCOSE UR STRIP-MCNC: NEGATIVE MG/DL
KETONES UR QL: NEGATIVE
LEUKOCYTE EST, POC: NEGATIVE
NITRITE UR-MCNC: NEGATIVE MG/ML
PH UR: 5 [PH] (ref 5–8)
PROT UR STRIP-MCNC: NEGATIVE MG/DL
RBC # UR STRIP: NEGATIVE /UL
SP GR UR: 1.01 (ref 1–1.03)
UROBILINOGEN UR QL: NORMAL

## 2017-08-17 PROCEDURE — 99213 OFFICE O/P EST LOW 20 MIN: CPT | Performed by: FAMILY MEDICINE

## 2017-08-17 PROCEDURE — 81003 URINALYSIS AUTO W/O SCOPE: CPT | Performed by: FAMILY MEDICINE

## 2017-08-17 RX ORDER — HYDROCODONE BITARTRATE AND ACETAMINOPHEN 5; 325 MG/1; MG/1
1 TABLET ORAL EVERY 8 HOURS PRN
Qty: 90 TABLET | Refills: 0 | Status: SHIPPED | OUTPATIENT
Start: 2017-08-17 | End: 2017-09-28 | Stop reason: SDUPTHER

## 2017-08-17 RX ORDER — LEVOTHYROXINE SODIUM 112 UG/1
112 TABLET ORAL DAILY
Qty: 90 TABLET | Refills: 1 | Status: SHIPPED | OUTPATIENT
Start: 2017-08-17 | End: 2018-01-19 | Stop reason: SDUPTHER

## 2017-08-19 LAB
BACTERIA UR CULT: NORMAL
BACTERIA UR CULT: NORMAL

## 2017-09-05 ENCOUNTER — OFFICE VISIT (OUTPATIENT)
Dept: INTERNAL MEDICINE | Facility: CLINIC | Age: 82
End: 2017-09-05

## 2017-09-05 ENCOUNTER — HOSPITAL ENCOUNTER (OUTPATIENT)
Dept: GENERAL RADIOLOGY | Facility: HOSPITAL | Age: 82
Discharge: HOME | End: 2017-09-05
Admitting: PHYSICIAN ASSISTANT

## 2017-09-05 VITALS
WEIGHT: 113.6 LBS | TEMPERATURE: 98.1 F | HEIGHT: 66 IN | OXYGEN SATURATION: 95 % | DIASTOLIC BLOOD PRESSURE: 62 MMHG | SYSTOLIC BLOOD PRESSURE: 100 MMHG | BODY MASS INDEX: 18.26 KG/M2 | HEART RATE: 54 BPM

## 2017-09-05 DIAGNOSIS — M54.9 UPPER BACK PAIN ON LEFT SIDE: ICD-10-CM
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
## 2017-09-05 DIAGNOSIS — M25.552 LEFT HIP PAIN: ICD-10-CM

## 2017-09-05 PROCEDURE — 73502 X-RAY EXAM HIP UNI 2-3 VIEWS: CPT

## 2017-09-05 PROCEDURE — 99213 OFFICE O/P EST LOW 20 MIN: CPT | Performed by: PHYSICIAN ASSISTANT

## 2017-09-05 NOTE — PROGRESS NOTES
Deidre Bobby is a 89 y.o. female.     Subjective   History of Present Illness   Here today with concern of left hip pain and middle back pain since falling 1 week ago. She reports she lost her balance and fell backwards. Had some bruising and swelling of the left hip for a few days which is improving but not fully resolved.     May not have been taking levothyroxine as prescribed. Recently was found to be dropping the small pills accidentally when taking her medications but compliance has improved in the last 1-2 weeks since the staff at [facility] has been administering her medications.       The following portions of the patient's history were reviewed and updated as appropriate: allergies, current medications, past family history, past medical history, past social history, past surgical history and problem list.    Review of Systems    Constitutional: Negative for appetite change, chills, fatigue, fever and unexpected weight change.   HENT: Negative for congestion, ear pain, hearing loss, nosebleeds, postnasal drip, rhinorrhea, sore throat, tinnitus and trouble swallowing.    Eyes: Negative for photophobia, discharge and visual disturbance.   Respiratory: Negative for cough, chest tightness, shortness of breath and wheezing.    Cardiovascular: Negative for chest pain, palpitations and leg swelling.   Gastrointestinal: Negative for abdominal distention, abdominal pain, blood in stool, constipation, diarrhea, nausea and vomiting.   Endocrine: Negative for cold intolerance, heat intolerance, polydipsia, polyphagia and polyuria.   Musculoskeletal:  Left hip pain, middle back pain, swelling over left hip.  Negative for myalgias, neck pain and neck stiffness.   Skin: Negative for color change, pallor, rash and wound.   Allergic/Immunologic: Negative for environmental allergies, food allergies and immunocompromised state.   Neurological: Negative for dizziness, tremors, seizures, weakness, numbness and headaches.  "  Hematological: Negative for adenopathy. Does not bruise/bleed easily.   Psychiatric/Behavioral: Negative for sleep disturbances, agitation, behavioral problems, confusion, hallucinations, self-injury and suicidal ideas. The patient is not nervous/anxious.      Objective    Physical Exam  Constitutional: Oriented to person, place, and time. Appears well-developed and well-nourished.   HENT:   Head: Normocephalic and atraumatic.   Eyes: EOM are normal. Pupils are equal, round, and reactive to light.   Neck: Normal range of motion. Neck supple.   Cardiovascular: Normal rate, regular rhythm and normal heart sounds.    Pulmonary/Chest: Effort normal and breath sounds normal. No respiratory distress.  Has no wheezes or rales. Exhibits no chest wall tenderness.   Abdominal: Soft. Bowel sounds are normal. Exhibits no distension and no mass. There is no tenderness.   Musculoskeletal: palpable knot of left hip with mild tenderness. Normal range of motion. Ambulating via wheelchair today. Mild tenderness left upper thoracic paravertebral muscle tenderness.   Neurological: Alert and oriented to person, place, and time.   Skin: Skin is warm and dry without visible ecchymosis.   Psychiatric: Has a normal mood and affect. Behavior is normal. Judgment and thought content normal.       /62  Pulse 95  Temp 98.1 °F (36.7 °C)  Ht 65.5\" (166.4 cm)  Wt 113 lb 9.6 oz (51.5 kg)  SpO2 (!) 54%  BMI 18.62 kg/m2    Nursing note and vitals reviewed.        Assessment/Plan   Deidre was seen today for fall.    Diagnoses and all orders for this visit:    Fall, initial encounter  -     XR Hip With or Without Pelvis 2 - 3 View Left    Left hip pain  -     XR Hip With or Without Pelvis 2 - 3 View Left  Palpable knot of left hip without ecchymosis.     Upper back pain on left side  Muscle tenderness related to the fall.   Consider imaging if no improvement in pain over the next week.     Call or RTC if symptoms worsen or persist.        "

## 2017-09-15 RX ORDER — PAROXETINE HYDROCHLORIDE 20 MG/1
TABLET, FILM COATED ORAL
Qty: 30 TABLET | Refills: 5 | Status: SHIPPED | OUTPATIENT
Start: 2017-09-15 | End: 2018-01-01 | Stop reason: SDUPTHER

## 2017-09-28 DIAGNOSIS — M54.9 CHRONIC BACK PAIN, UNSPECIFIED BACK LOCATION, UNSPECIFIED BACK PAIN LATERALITY: ICD-10-CM

## 2017-09-28 DIAGNOSIS — M15.9 PRIMARY OSTEOARTHRITIS INVOLVING MULTIPLE JOINTS: ICD-10-CM

## 2017-09-28 DIAGNOSIS — G89.29 CHRONIC BACK PAIN, UNSPECIFIED BACK LOCATION, UNSPECIFIED BACK PAIN LATERALITY: ICD-10-CM

## 2017-09-28 RX ORDER — HYDROCODONE BITARTRATE AND ACETAMINOPHEN 5; 325 MG/1; MG/1
1 TABLET ORAL EVERY 8 HOURS PRN
Qty: 90 TABLET | Refills: 0 | Status: SHIPPED | OUTPATIENT
Start: 2017-09-28 | End: 2017-10-17 | Stop reason: SDUPTHER

## 2017-10-17 ENCOUNTER — OFFICE VISIT (OUTPATIENT)
Dept: INTERNAL MEDICINE | Facility: CLINIC | Age: 82
End: 2017-10-17

## 2017-10-17 VITALS
SYSTOLIC BLOOD PRESSURE: 122 MMHG | WEIGHT: 112 LBS | OXYGEN SATURATION: 98 % | DIASTOLIC BLOOD PRESSURE: 70 MMHG | HEART RATE: 61 BPM | RESPIRATION RATE: 12 BRPM | BODY MASS INDEX: 18 KG/M2 | HEIGHT: 66 IN

## 2017-10-17 DIAGNOSIS — M15.9 PRIMARY OSTEOARTHRITIS INVOLVING MULTIPLE JOINTS: ICD-10-CM

## 2017-10-17 DIAGNOSIS — G89.29 CHRONIC BACK PAIN, UNSPECIFIED BACK LOCATION, UNSPECIFIED BACK PAIN LATERALITY: ICD-10-CM

## 2017-10-17 DIAGNOSIS — M54.9 CHRONIC BACK PAIN, UNSPECIFIED BACK LOCATION, UNSPECIFIED BACK PAIN LATERALITY: ICD-10-CM

## 2017-10-17 DIAGNOSIS — Z23 FLU VACCINE NEED: Primary | ICD-10-CM

## 2017-10-17 DIAGNOSIS — K64.9 HEMORRHOIDS, UNSPECIFIED HEMORRHOID TYPE: ICD-10-CM

## 2017-10-17 PROCEDURE — 99213 OFFICE O/P EST LOW 20 MIN: CPT | Performed by: FAMILY MEDICINE

## 2017-10-17 PROCEDURE — 90662 IIV NO PRSV INCREASED AG IM: CPT | Performed by: FAMILY MEDICINE

## 2017-10-17 PROCEDURE — G0008 ADMIN INFLUENZA VIRUS VAC: HCPCS | Performed by: FAMILY MEDICINE

## 2017-10-17 RX ORDER — HYDROCODONE BITARTRATE AND ACETAMINOPHEN 5; 325 MG/1; MG/1
1 TABLET ORAL EVERY 8 HOURS PRN
Qty: 90 TABLET | Refills: 0 | Status: SHIPPED | OUTPATIENT
Start: 2017-10-17 | End: 2017-11-16 | Stop reason: SDUPTHER

## 2017-10-18 LAB
T4 SERPL-MCNC: 9.4 UG/DL (ref 4.5–12)
TSH SERPL DL<=0.005 MIU/L-ACNC: 0.89 MIU/ML (ref 0.47–4.68)

## 2017-10-20 RX ORDER — CARVEDILOL 3.12 MG/1
TABLET ORAL
Qty: 60 TABLET | Refills: 5 | Status: SHIPPED | OUTPATIENT
Start: 2017-10-20 | End: 2018-01-01 | Stop reason: SDUPTHER

## 2017-11-16 ENCOUNTER — OFFICE VISIT (OUTPATIENT)
Dept: INTERNAL MEDICINE | Facility: CLINIC | Age: 82
End: 2017-11-16

## 2017-11-16 VITALS
BODY MASS INDEX: 17.9 KG/M2 | DIASTOLIC BLOOD PRESSURE: 80 MMHG | WEIGHT: 111.4 LBS | OXYGEN SATURATION: 96 % | SYSTOLIC BLOOD PRESSURE: 122 MMHG | RESPIRATION RATE: 12 BRPM | HEIGHT: 66 IN | HEART RATE: 66 BPM

## 2017-11-16 DIAGNOSIS — M54.9 CHRONIC BACK PAIN, UNSPECIFIED BACK LOCATION, UNSPECIFIED BACK PAIN LATERALITY: ICD-10-CM

## 2017-11-16 DIAGNOSIS — G89.29 CHRONIC BACK PAIN, UNSPECIFIED BACK LOCATION, UNSPECIFIED BACK PAIN LATERALITY: ICD-10-CM

## 2017-11-16 DIAGNOSIS — K59.09 CHRONIC CONSTIPATION WITH OVERFLOW: Primary | ICD-10-CM

## 2017-11-16 DIAGNOSIS — M15.9 PRIMARY OSTEOARTHRITIS INVOLVING MULTIPLE JOINTS: ICD-10-CM

## 2017-11-16 PROCEDURE — 99214 OFFICE O/P EST MOD 30 MIN: CPT | Performed by: FAMILY MEDICINE

## 2017-11-16 RX ORDER — POLYETHYLENE GLYCOL 3350 17 G/17G
17 POWDER, FOR SOLUTION ORAL DAILY
Qty: 30 PACKET | Refills: 5 | Status: SHIPPED | OUTPATIENT
Start: 2017-11-16 | End: 2018-01-01 | Stop reason: SDUPTHER

## 2017-11-16 RX ORDER — HYDROCODONE BITARTRATE AND ACETAMINOPHEN 5; 325 MG/1; MG/1
1 TABLET ORAL EVERY 8 HOURS PRN
Qty: 90 TABLET | Refills: 0 | Status: SHIPPED | OUTPATIENT
Start: 2017-11-16 | End: 2017-12-29 | Stop reason: SDUPTHER

## 2017-12-29 DIAGNOSIS — M54.9 CHRONIC BACK PAIN, UNSPECIFIED BACK LOCATION, UNSPECIFIED BACK PAIN LATERALITY: ICD-10-CM

## 2017-12-29 DIAGNOSIS — M15.9 PRIMARY OSTEOARTHRITIS INVOLVING MULTIPLE JOINTS: ICD-10-CM

## 2017-12-29 DIAGNOSIS — G89.29 CHRONIC BACK PAIN, UNSPECIFIED BACK LOCATION, UNSPECIFIED BACK PAIN LATERALITY: ICD-10-CM

## 2017-12-29 RX ORDER — HYDROCODONE BITARTRATE AND ACETAMINOPHEN 5; 325 MG/1; MG/1
1 TABLET ORAL EVERY 8 HOURS PRN
Qty: 90 TABLET | Refills: 0 | Status: SHIPPED | OUTPATIENT
Start: 2017-12-29 | End: 2018-01-30 | Stop reason: SDUPTHER

## 2018-01-01 ENCOUNTER — LAB REQUISITION (OUTPATIENT)
Dept: LAB | Facility: HOSPITAL | Age: 83
End: 2018-01-01

## 2018-01-01 ENCOUNTER — HOSPITAL ENCOUNTER (INPATIENT)
Facility: HOSPITAL | Age: 83
LOS: 8 days | Discharge: NURSING, REHAB OR LONG-TERM CARE | End: 2018-08-22
Attending: EMERGENCY MEDICINE | Admitting: INTERNAL MEDICINE

## 2018-01-01 ENCOUNTER — HOSPITAL ENCOUNTER (OUTPATIENT)
Dept: GENERAL RADIOLOGY | Facility: HOSPITAL | Age: 83
Discharge: HOME | End: 2018-03-02
Admitting: FAMILY MEDICINE

## 2018-01-01 ENCOUNTER — APPOINTMENT (OUTPATIENT)
Dept: CT IMAGING | Facility: HOSPITAL | Age: 83
End: 2018-01-01

## 2018-01-01 ENCOUNTER — TELEPHONE (OUTPATIENT)
Dept: INTERNAL MEDICINE | Facility: CLINIC | Age: 83
End: 2018-01-01

## 2018-01-01 ENCOUNTER — NURSING HOME (OUTPATIENT)
Dept: INTERNAL MEDICINE | Facility: CLINIC | Age: 83
End: 2018-01-01

## 2018-01-01 ENCOUNTER — APPOINTMENT (OUTPATIENT)
Dept: CARDIOLOGY | Facility: HOSPITAL | Age: 83
End: 2018-01-01
Attending: FAMILY MEDICINE

## 2018-01-01 ENCOUNTER — NURSING HOME (OUTPATIENT)
Dept: FAMILY MEDICINE CLINIC | Facility: CLINIC | Age: 83
End: 2018-01-01

## 2018-01-01 ENCOUNTER — OFFICE VISIT (OUTPATIENT)
Dept: INTERNAL MEDICINE | Facility: CLINIC | Age: 83
End: 2018-01-01

## 2018-01-01 ENCOUNTER — APPOINTMENT (OUTPATIENT)
Dept: GENERAL RADIOLOGY | Facility: HOSPITAL | Age: 83
End: 2018-01-01

## 2018-01-01 ENCOUNTER — OUTSIDE FACILITY SERVICE (OUTPATIENT)
Dept: INTERNAL MEDICINE | Facility: CLINIC | Age: 83
End: 2018-01-01

## 2018-01-01 ENCOUNTER — HOSPITAL ENCOUNTER (EMERGENCY)
Facility: HOSPITAL | Age: 83
Discharge: HOME | End: 2018-10-20
Attending: EMERGENCY MEDICINE | Admitting: EMERGENCY MEDICINE

## 2018-01-01 ENCOUNTER — APPOINTMENT (OUTPATIENT)
Dept: GENERAL RADIOLOGY | Facility: HOSPITAL | Age: 83
End: 2018-01-01
Attending: FAMILY MEDICINE

## 2018-01-01 ENCOUNTER — APPOINTMENT (OUTPATIENT)
Dept: GENERAL RADIOLOGY | Facility: HOSPITAL | Age: 83
End: 2018-01-01
Attending: INTERNAL MEDICINE

## 2018-01-01 VITALS
DIASTOLIC BLOOD PRESSURE: 88 MMHG | HEART RATE: 59 BPM | BODY MASS INDEX: 18.04 KG/M2 | HEIGHT: 66 IN | WEIGHT: 112.25 LBS | TEMPERATURE: 97.9 F | OXYGEN SATURATION: 97 % | SYSTOLIC BLOOD PRESSURE: 160 MMHG

## 2018-01-01 VITALS
TEMPERATURE: 98.3 F | HEART RATE: 62 BPM | OXYGEN SATURATION: 96 % | WEIGHT: 111.25 LBS | SYSTOLIC BLOOD PRESSURE: 94 MMHG | HEIGHT: 66 IN | DIASTOLIC BLOOD PRESSURE: 60 MMHG | BODY MASS INDEX: 17.88 KG/M2

## 2018-01-01 VITALS
HEIGHT: 65 IN | RESPIRATION RATE: 18 BRPM | BODY MASS INDEX: 17.84 KG/M2 | DIASTOLIC BLOOD PRESSURE: 60 MMHG | OXYGEN SATURATION: 97 % | TEMPERATURE: 97.7 F | WEIGHT: 107.06 LBS | HEART RATE: 64 BPM | SYSTOLIC BLOOD PRESSURE: 116 MMHG

## 2018-01-01 VITALS
SYSTOLIC BLOOD PRESSURE: 124 MMHG | BODY MASS INDEX: 18.16 KG/M2 | DIASTOLIC BLOOD PRESSURE: 80 MMHG | RESPIRATION RATE: 12 BRPM | OXYGEN SATURATION: 97 % | HEART RATE: 59 BPM | WEIGHT: 113 LBS | HEIGHT: 66 IN

## 2018-01-01 VITALS
DIASTOLIC BLOOD PRESSURE: 75 MMHG | HEART RATE: 75 BPM | RESPIRATION RATE: 18 BRPM | OXYGEN SATURATION: 97 % | BODY MASS INDEX: 17.97 KG/M2 | SYSTOLIC BLOOD PRESSURE: 135 MMHG | WEIGHT: 108 LBS | TEMPERATURE: 99 F

## 2018-01-01 DIAGNOSIS — R13.11 ORAL PHASE DYSPHAGIA: Primary | ICD-10-CM

## 2018-01-01 DIAGNOSIS — Z78.9 IMPAIRED MOBILITY AND ADLS: ICD-10-CM

## 2018-01-01 DIAGNOSIS — I50.32 CHRONIC DIASTOLIC CONGESTIVE HEART FAILURE (HCC): ICD-10-CM

## 2018-01-01 DIAGNOSIS — K21.00 GASTROESOPHAGEAL REFLUX DISEASE WITH ESOPHAGITIS: ICD-10-CM

## 2018-01-01 DIAGNOSIS — Z74.09 IMPAIRED MOBILITY AND ADLS: Primary | ICD-10-CM

## 2018-01-01 DIAGNOSIS — H91.93 BILATERAL HEARING LOSS, UNSPECIFIED HEARING LOSS TYPE: ICD-10-CM

## 2018-01-01 DIAGNOSIS — E03.4 HYPOTHYROIDISM DUE TO ACQUIRED ATROPHY OF THYROID: Primary | ICD-10-CM

## 2018-01-01 DIAGNOSIS — E78.00 HYPERCHOLESTEROLEMIA: ICD-10-CM

## 2018-01-01 DIAGNOSIS — E03.4 HYPOTHYROIDISM DUE TO ACQUIRED ATROPHY OF THYROID: ICD-10-CM

## 2018-01-01 DIAGNOSIS — S06.5XAA SUBDURAL HEMATOMA (HCC): ICD-10-CM
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
## 2018-01-01 DIAGNOSIS — Z78.9 IMPAIRED MOBILITY AND ADLS: Primary | ICD-10-CM

## 2018-01-01 DIAGNOSIS — M54.9 CHRONIC BACK PAIN, UNSPECIFIED BACK LOCATION, UNSPECIFIED BACK PAIN LATERALITY: ICD-10-CM

## 2018-01-01 DIAGNOSIS — Z74.09 IMPAIRED MOBILITY AND ADLS: ICD-10-CM

## 2018-01-01 DIAGNOSIS — G89.29 CHRONIC BACK PAIN, UNSPECIFIED BACK LOCATION, UNSPECIFIED BACK PAIN LATERALITY: ICD-10-CM

## 2018-01-01 DIAGNOSIS — R26.89 IMPAIRMENT OF BALANCE: ICD-10-CM

## 2018-01-01 DIAGNOSIS — R29.898 WEAKNESS OF BOTH LOWER EXTREMITIES: ICD-10-CM

## 2018-01-01 DIAGNOSIS — Z74.09 IMPAIRED FUNCTIONAL MOBILITY, BALANCE, GAIT, AND ENDURANCE: ICD-10-CM

## 2018-01-01 DIAGNOSIS — R49.0 HOARSENESS OF VOICE: ICD-10-CM

## 2018-01-01 DIAGNOSIS — I50.32 CHRONIC DIASTOLIC CONGESTIVE HEART FAILURE (HCC): Primary | ICD-10-CM

## 2018-01-01 DIAGNOSIS — N39.0 ACUTE UTI (URINARY TRACT INFECTION): Primary | ICD-10-CM

## 2018-01-01 DIAGNOSIS — M15.9 PRIMARY OSTEOARTHRITIS INVOLVING MULTIPLE JOINTS: ICD-10-CM
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
## 2018-01-01 DIAGNOSIS — F01.518 MIXED VASCULAR AND NEURODEGENERATIVE DEMENTIA WITH BEHAVIORAL DISTURBANCE (HCC): ICD-10-CM

## 2018-01-01 DIAGNOSIS — R53.81 GENERAL PHYSICAL DETERIORATION: Primary | ICD-10-CM

## 2018-01-01 DIAGNOSIS — E78.00 HYPERCHOLESTEROLEMIA: Primary | ICD-10-CM
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
## 2018-01-01 DIAGNOSIS — H91.93 BILATERAL HEARING LOSS, UNSPECIFIED HEARING LOSS TYPE: Primary | ICD-10-CM

## 2018-01-01 DIAGNOSIS — Z23 NEED FOR PNEUMOCOCCAL VACCINATION: ICD-10-CM

## 2018-01-01 DIAGNOSIS — R49.0 CHRONIC HOARSENESS: ICD-10-CM

## 2018-01-01 DIAGNOSIS — K21.9 GASTROESOPHAGEAL REFLUX DISEASE WITHOUT ESOPHAGITIS: ICD-10-CM

## 2018-01-01 DIAGNOSIS — N30.00 ACUTE CYSTITIS WITHOUT HEMATURIA: Primary | ICD-10-CM

## 2018-01-01 DIAGNOSIS — R13.13 PHARYNGEAL DYSPHAGIA: ICD-10-CM

## 2018-01-01 DIAGNOSIS — R13.11 ORAL PHASE DYSPHAGIA: ICD-10-CM

## 2018-01-01 DIAGNOSIS — Z00.00 ROUTINE GENERAL MEDICAL EXAMINATION AT A HEALTH CARE FACILITY: ICD-10-CM

## 2018-01-01 DIAGNOSIS — N30.00 ACUTE CYSTITIS WITHOUT HEMATURIA: ICD-10-CM

## 2018-01-01 DIAGNOSIS — K59.09 CHRONIC CONSTIPATION WITH OVERFLOW: ICD-10-CM

## 2018-01-01 DIAGNOSIS — F01.50 MIXED VASCULAR AND NEURODEGENERATIVE DEMENTIA WITHOUT BEHAVIORAL DISTURBANCE (HCC): ICD-10-CM

## 2018-01-01 DIAGNOSIS — N39.0 URINARY TRACT INFECTION WITHOUT HEMATURIA, SITE UNSPECIFIED: Primary | ICD-10-CM

## 2018-01-01 LAB
ALBUMIN SERPL-MCNC: 2.7 G/DL (ref 3.5–5)
ALBUMIN SERPL-MCNC: 3 G/DL (ref 3.5–5)
ALBUMIN SERPL-MCNC: 3.2 G/DL (ref 3.5–5)
ALBUMIN SERPL-MCNC: 3.6 G/DL (ref 3.5–5)
ALBUMIN SERPL-MCNC: 4 G/DL (ref 3.5–5)
ALBUMIN SERPL-MCNC: 4.2 G/DL (ref 3.5–5)
ALBUMIN/GLOB SERPL: 1.1 G/DL (ref 1–2)
ALBUMIN/GLOB SERPL: 1.2 G/DL (ref 1–2)
ALBUMIN/GLOB SERPL: 1.3 G/DL (ref 1–2)
ALBUMIN/GLOB SERPL: 1.5 G/DL (ref 1–2)
ALP SERPL-CCNC: 103 U/L (ref 38–126)
ALP SERPL-CCNC: 105 U/L (ref 38–126)
ALP SERPL-CCNC: 66 U/L (ref 38–126)
ALP SERPL-CCNC: 70 U/L (ref 38–126)
ALP SERPL-CCNC: 84 U/L (ref 38–126)
ALP SERPL-CCNC: 95 U/L (ref 38–126)
ALT SERPL W P-5'-P-CCNC: 27 U/L (ref 13–69)
ALT SERPL W P-5'-P-CCNC: 28 U/L (ref 13–69)
ALT SERPL W P-5'-P-CCNC: 32 U/L (ref 13–69)
ALT SERPL W P-5'-P-CCNC: 35 U/L (ref 13–69)
ALT SERPL W P-5'-P-CCNC: 41 U/L (ref 13–69)
ALT SERPL-CCNC: 24 U/L (ref 13–69)
ANION GAP SERPL CALCULATED.3IONS-SCNC: 11.1 MMOL/L (ref 10–20)
ANION GAP SERPL CALCULATED.3IONS-SCNC: 11.7 MMOL/L (ref 10–20)
ANION GAP SERPL CALCULATED.3IONS-SCNC: 11.8 MMOL/L (ref 10–20)
ANION GAP SERPL CALCULATED.3IONS-SCNC: 11.8 MMOL/L (ref 10–20)
ANION GAP SERPL CALCULATED.3IONS-SCNC: 12.3 MMOL/L (ref 10–20)
ANION GAP SERPL CALCULATED.3IONS-SCNC: 12.6 MMOL/L (ref 10–20)
ANION GAP SERPL CALCULATED.3IONS-SCNC: 13 MMOL/L (ref 10–20)
ANION GAP SERPL CALCULATED.3IONS-SCNC: 13.1 MMOL/L (ref 10–20)
ANION GAP SERPL CALCULATED.3IONS-SCNC: 13.7 MMOL/L (ref 10–20)
ANION GAP SERPL CALCULATED.3IONS-SCNC: 13.8 MMOL/L (ref 10–20)
ANION GAP SERPL CALCULATED.3IONS-SCNC: 13.8 MMOL/L (ref 10–20)
ANION GAP SERPL CALCULATED.3IONS-SCNC: 14.9 MMOL/L (ref 10–20)
ANION GAP SERPL CALCULATED.3IONS-SCNC: 15.1 MMOL/L (ref 10–20)
ANION GAP SERPL CALCULATED.3IONS-SCNC: 16 MMOL/L (ref 10–20)
ANION GAP SERPL CALCULATED.3IONS-SCNC: 9.8 MMOL/L (ref 10–20)
APTT PPP: 24.4 SECONDS (ref 25–36)
AST SERPL-CCNC: 26 U/L (ref 15–46)
AST SERPL-CCNC: 26 U/L (ref 15–46)
AST SERPL-CCNC: 30 U/L (ref 15–46)
AST SERPL-CCNC: 30 U/L (ref 15–46)
AST SERPL-CCNC: 42 U/L (ref 15–46)
AST SERPL-CCNC: 44 U/L (ref 15–46)
BACTERIA SPEC AEROBE CULT: ABNORMAL
BACTERIA SPEC AEROBE CULT: ABNORMAL
BACTERIA SPEC AEROBE CULT: NORMAL
BACTERIA SPEC AEROBE CULT: NORMAL
BACTERIA UR QL AUTO: ABNORMAL /HPF
BACTERIA UR QL AUTO: ABNORMAL /HPF
BASOPHILS # BLD AUTO: 0.02 10*3/MM3 (ref 0–0.2)
BASOPHILS # BLD AUTO: 0.03 10*3/MM3 (ref 0–0.2)
BASOPHILS # BLD AUTO: 0.04 10*3/MM3 (ref 0–0.2)
BASOPHILS # BLD AUTO: 0.05 10*3/MM3 (ref 0–0.2)
BASOPHILS # BLD AUTO: 0.05 10*3/MM3 (ref 0–0.2)
BASOPHILS # BLD AUTO: 0.07 10*3/MM3 (ref 0–0.2)
BASOPHILS NFR BLD AUTO: 0.2 % (ref 0–2.5)
BASOPHILS NFR BLD AUTO: 0.3 % (ref 0–2.5)
BASOPHILS NFR BLD AUTO: 0.4 % (ref 0–2.5)
BASOPHILS NFR BLD AUTO: 0.4 % (ref 0–2.5)
BASOPHILS NFR BLD AUTO: 0.5 % (ref 0–2.5)
BASOPHILS NFR BLD AUTO: 0.6 % (ref 0–2.5)
BH CV ECHO MEAS - EF(MOD-BP): 57 %
BH CV ECHO MEAS - RVSP: 47 MMHG
BILIRUB SERPL-MCNC: 0.2 MG/DL (ref 0.2–1.3)
BILIRUB SERPL-MCNC: 0.4 MG/DL (ref 0.2–1.3)
BILIRUB SERPL-MCNC: 0.5 MG/DL (ref 0.2–1.3)
BILIRUB SERPL-MCNC: 0.8 MG/DL (ref 0.2–1.3)
BILIRUB UR QL STRIP: ABNORMAL
BILIRUB UR QL STRIP: NEGATIVE
BUN BLD-MCNC: 10 MG/DL (ref 7–20)
BUN BLD-MCNC: 11 MG/DL (ref 7–20)
BUN BLD-MCNC: 12 MG/DL (ref 7–20)
BUN BLD-MCNC: 13 MG/DL (ref 7–20)
BUN BLD-MCNC: 14 MG/DL (ref 7–20)
BUN BLD-MCNC: 14 MG/DL (ref 7–20)
BUN BLD-MCNC: 15 MG/DL (ref 7–20)
BUN BLD-MCNC: 16 MG/DL (ref 7–20)
BUN BLD-MCNC: 16 MG/DL (ref 7–20)
BUN BLD-MCNC: 17 MG/DL (ref 7–20)
BUN BLD-MCNC: 18 MG/DL (ref 7–20)
BUN BLD-MCNC: 20 MG/DL (ref 7–20)
BUN BLD-MCNC: 25 MG/DL (ref 7–20)
BUN BLD-MCNC: 35 MG/DL (ref 7–20)
BUN BLD-MCNC: 39 MG/DL (ref 7–20)
BUN SERPL-MCNC: 21 MG/DL (ref 7–20)
BUN/CREAT SERPL: 16.3 (ref 7.1–23.5)
BUN/CREAT SERPL: 16.7 (ref 7.1–23.5)
BUN/CREAT SERPL: 17.1 (ref 7.1–23.5)
BUN/CREAT SERPL: 18.3 (ref 7.1–23.5)
BUN/CREAT SERPL: 19.1 (ref 7.1–23.5)
BUN/CREAT SERPL: 20 (ref 7.1–23.5)
BUN/CREAT SERPL: 22.9 (ref 7.1–23.5)
BUN/CREAT SERPL: 22.9 (ref 7.1–23.5)
BUN/CREAT SERPL: 23.3 (ref 7.1–23.5)
BUN/CREAT SERPL: 23.3 (ref 7.1–23.5)
BUN/CREAT SERPL: 24.3 (ref 7.1–23.5)
BUN/CREAT SERPL: 24.4 (ref 7.1–23.5)
BUN/CREAT SERPL: 25 (ref 7.1–23.5)
BUN/CREAT SERPL: 25 (ref 7.1–23.5)
BUN/CREAT SERPL: 25.7 (ref 7.1–23.5)
BUN/CREAT SERPL: 31.3 (ref 7.1–23.5)
CALCIUM SERPL-MCNC: 9.5 MG/DL (ref 8.4–10.2)
CALCIUM SPEC-SCNC: 8 MG/DL (ref 8.4–10.2)
CALCIUM SPEC-SCNC: 8.1 MG/DL (ref 8.4–10.2)
CALCIUM SPEC-SCNC: 8.1 MG/DL (ref 8.4–10.2)
CALCIUM SPEC-SCNC: 8.2 MG/DL (ref 8.4–10.2)
CALCIUM SPEC-SCNC: 8.3 MG/DL (ref 8.4–10.2)
CALCIUM SPEC-SCNC: 8.3 MG/DL (ref 8.4–10.2)
CALCIUM SPEC-SCNC: 8.5 MG/DL (ref 8.4–10.2)
CALCIUM SPEC-SCNC: 8.5 MG/DL (ref 8.4–10.2)
CALCIUM SPEC-SCNC: 8.6 MG/DL (ref 8.4–10.2)
CALCIUM SPEC-SCNC: 8.6 MG/DL (ref 8.4–10.2)
CALCIUM SPEC-SCNC: 9 MG/DL (ref 8.4–10.2)
CALCIUM SPEC-SCNC: 9 MG/DL (ref 8.4–10.2)
CALCIUM SPEC-SCNC: 9.6 MG/DL (ref 8.4–10.2)
CHLORIDE SERPL-SCNC: 103 MMOL/L (ref 98–107)
CHLORIDE SERPL-SCNC: 90 MMOL/L (ref 98–107)
CHLORIDE SERPL-SCNC: 91 MMOL/L (ref 98–107)
CHLORIDE SERPL-SCNC: 92 MMOL/L (ref 98–107)
CHLORIDE SERPL-SCNC: 92 MMOL/L (ref 98–107)
CHLORIDE SERPL-SCNC: 93 MMOL/L (ref 98–107)
CHLORIDE SERPL-SCNC: 93 MMOL/L (ref 98–107)
CHLORIDE SERPL-SCNC: 94 MMOL/L (ref 98–107)
CHLORIDE SERPL-SCNC: 94 MMOL/L (ref 98–107)
CHLORIDE SERPL-SCNC: 95 MMOL/L (ref 98–107)
CHLORIDE SERPL-SCNC: 95 MMOL/L (ref 98–107)
CHLORIDE SERPL-SCNC: 96 MMOL/L (ref 98–107)
CHLORIDE SERPL-SCNC: 97 MMOL/L (ref 98–107)
CLARITY UR: ABNORMAL
CLARITY UR: ABNORMAL
CO2 SERPL-SCNC: 22 MMOL/L (ref 26–30)
CO2 SERPL-SCNC: 23 MMOL/L (ref 26–30)
CO2 SERPL-SCNC: 24 MMOL/L (ref 26–30)
CO2 SERPL-SCNC: 24 MMOL/L (ref 26–30)
CO2 SERPL-SCNC: 25 MMOL/L (ref 26–30)
CO2 SERPL-SCNC: 26 MMOL/L (ref 26–30)
CO2 SERPL-SCNC: 26 MMOL/L (ref 26–30)
CO2 SERPL-SCNC: 27 MMOL/L (ref 26–30)
CO2 SERPL-SCNC: 28 MMOL/L (ref 26–30)
CO2 SERPL-SCNC: 29 MMOL/L (ref 26–30)
CO2 SERPL-SCNC: 31 MMOL/L (ref 26–30)
COLOR UR: YELLOW
COLOR UR: YELLOW
CREAT BLD-MCNC: 0.6 MG/DL (ref 0.6–1.3)
CREAT BLD-MCNC: 0.7 MG/DL (ref 0.6–1.3)
CREAT BLD-MCNC: 0.8 MG/DL (ref 0.6–1.3)
CREAT BLD-MCNC: 1.5 MG/DL (ref 0.6–1.3)
CREAT BLD-MCNC: 1.6 MG/DL (ref 0.6–1.3)
CREAT SERPL-MCNC: 1.1 MG/DL (ref 0.6–1.3)
D-LACTATE SERPL-SCNC: 1.4 MMOL/L (ref 0.5–2)
DEPRECATED RDW RBC AUTO: 41.1 FL (ref 37–54)
DEPRECATED RDW RBC AUTO: 43 FL (ref 37–54)
DEPRECATED RDW RBC AUTO: 43 FL (ref 37–54)
DEPRECATED RDW RBC AUTO: 43.1 FL (ref 37–54)
DEPRECATED RDW RBC AUTO: 43.5 FL (ref 37–54)
DEPRECATED RDW RBC AUTO: 44.1 FL (ref 37–54)
DEPRECATED RDW RBC AUTO: 56.4 FL (ref 37–54)
EOSINOPHIL # BLD AUTO: 0.01 10*3/MM3 (ref 0–0.7)
EOSINOPHIL # BLD AUTO: 0.01 10*3/MM3 (ref 0–0.7)
EOSINOPHIL # BLD AUTO: 0.02 10*3/MM3 (ref 0–0.7)
EOSINOPHIL # BLD AUTO: 0.04 10*3/MM3 (ref 0–0.7)
EOSINOPHIL NFR BLD AUTO: 0.1 % (ref 0–7)
EOSINOPHIL NFR BLD AUTO: 0.1 % (ref 0–7)
EOSINOPHIL NFR BLD AUTO: 0.2 % (ref 0–7)
EOSINOPHIL NFR BLD AUTO: 0.4 % (ref 0–7)
ERYTHROCYTE [DISTWIDTH] IN BLOOD BY AUTOMATED COUNT: 12.3 % (ref 11.5–14.5)
ERYTHROCYTE [DISTWIDTH] IN BLOOD BY AUTOMATED COUNT: 12.3 % (ref 11.5–14.5)
ERYTHROCYTE [DISTWIDTH] IN BLOOD BY AUTOMATED COUNT: 12.4 % (ref 11.5–14.5)
ERYTHROCYTE [DISTWIDTH] IN BLOOD BY AUTOMATED COUNT: 12.5 % (ref 11.5–14.5)
ERYTHROCYTE [DISTWIDTH] IN BLOOD BY AUTOMATED COUNT: 12.6 % (ref 11.5–14.5)
ERYTHROCYTE [DISTWIDTH] IN BLOOD BY AUTOMATED COUNT: 12.7 % (ref 11.5–14.5)
ERYTHROCYTE [DISTWIDTH] IN BLOOD BY AUTOMATED COUNT: 12.8 % (ref 11.5–14.5)
ERYTHROCYTE [DISTWIDTH] IN BLOOD BY AUTOMATED COUNT: 14.7 % (ref 11.5–14.5)
GFR SERPL CREATININE-BSD FRML MDRD: 30 ML/MIN/1.73
GFR SERPL CREATININE-BSD FRML MDRD: 32 ML/MIN/1.73
GFR SERPL CREATININE-BSD FRML MDRD: 67 ML/MIN/1.73
GFR SERPL CREATININE-BSD FRML MDRD: 78 ML/MIN/1.73
GFR SERPL CREATININE-BSD FRML MDRD: 93 ML/MIN/1.73
GFR SERPLBLD CREATININE-BSD FMLA CKD-EPI: 46 ML/MIN/1.73
GFR SERPLBLD CREATININE-BSD FMLA CKD-EPI: 56 ML/MIN/1.73
GLOBULIN SER CALC-MCNC: 2.6 GM/DL
GLOBULIN UR ELPH-MCNC: 2.4 GM/DL
GLOBULIN UR ELPH-MCNC: 2.4 GM/DL
GLOBULIN UR ELPH-MCNC: 2.5 GM/DL
GLOBULIN UR ELPH-MCNC: 2.8 GM/DL
GLOBULIN UR ELPH-MCNC: 3.4 GM/DL
GLUCOSE BLD-MCNC: 104 MG/DL (ref 74–98)
GLUCOSE BLD-MCNC: 104 MG/DL (ref 74–98)
GLUCOSE BLD-MCNC: 107 MG/DL (ref 74–98)
GLUCOSE BLD-MCNC: 113 MG/DL (ref 74–98)
GLUCOSE BLD-MCNC: 115 MG/DL (ref 74–98)
GLUCOSE BLD-MCNC: 115 MG/DL (ref 74–98)
GLUCOSE BLD-MCNC: 118 MG/DL (ref 74–98)
GLUCOSE BLD-MCNC: 119 MG/DL (ref 74–98)
GLUCOSE BLD-MCNC: 124 MG/DL (ref 74–98)
GLUCOSE BLD-MCNC: 159 MG/DL (ref 74–98)
GLUCOSE BLD-MCNC: 170 MG/DL (ref 74–98)
GLUCOSE BLD-MCNC: 87 MG/DL (ref 74–98)
GLUCOSE BLD-MCNC: 95 MG/DL (ref 74–98)
GLUCOSE BLD-MCNC: 95 MG/DL (ref 74–98)
GLUCOSE BLD-MCNC: 96 MG/DL (ref 74–98)
GLUCOSE BLDC GLUCOMTR-MCNC: 115 MG/DL (ref 70–130)
GLUCOSE BLDC GLUCOMTR-MCNC: 151 MG/DL (ref 70–130)
GLUCOSE SERPL-MCNC: 88 MG/DL (ref 74–98)
GLUCOSE UR STRIP-MCNC: NEGATIVE MG/DL
GLUCOSE UR STRIP-MCNC: NEGATIVE MG/DL
HCT VFR BLD AUTO: 34.4 % (ref 37–47)
HCT VFR BLD AUTO: 36.7 % (ref 37–47)
HCT VFR BLD AUTO: 37.5 % (ref 37–47)
HCT VFR BLD AUTO: 37.7 % (ref 37–47)
HCT VFR BLD AUTO: 37.8 % (ref 37–47)
HCT VFR BLD AUTO: 39.3 % (ref 37–47)
HCT VFR BLD AUTO: 40.2 % (ref 37–47)
HCT VFR BLD AUTO: 40.8 % (ref 37–47)
HGB BLD-MCNC: 11.6 G/DL (ref 12–16)
HGB BLD-MCNC: 11.6 G/DL (ref 12–16)
HGB BLD-MCNC: 12.3 G/DL (ref 12–16)
HGB BLD-MCNC: 12.4 G/DL (ref 12–16)
HGB BLD-MCNC: 12.5 G/DL (ref 12–16)
HGB BLD-MCNC: 13 G/DL (ref 12–16)
HGB BLD-MCNC: 13.2 G/DL (ref 12–16)
HGB BLD-MCNC: 13.5 G/DL (ref 12–16)
HGB UR QL STRIP.AUTO: ABNORMAL
HGB UR QL STRIP.AUTO: ABNORMAL
HYALINE CASTS UR QL AUTO: ABNORMAL /LPF
HYALINE CASTS UR QL AUTO: ABNORMAL /LPF
IMM GRANULOCYTES # BLD: 0.06 10*3/MM3 (ref 0–0.06)
IMM GRANULOCYTES # BLD: 0.06 10*3/MM3 (ref 0–0.06)
IMM GRANULOCYTES # BLD: 0.07 10*3/MM3 (ref 0–0.06)
IMM GRANULOCYTES # BLD: 0.08 10*3/MM3 (ref 0–0.06)
IMM GRANULOCYTES # BLD: 0.1 10*3/MM3 (ref 0–0.06)
IMM GRANULOCYTES # BLD: 0.41 10*3/MM3 (ref 0–0.06)
IMM GRANULOCYTES NFR BLD: 0.5 % (ref 0–0.6)
IMM GRANULOCYTES NFR BLD: 0.6 % (ref 0–0.6)
IMM GRANULOCYTES NFR BLD: 0.6 % (ref 0–0.6)
IMM GRANULOCYTES NFR BLD: 0.7 % (ref 0–0.6)
IMM GRANULOCYTES NFR BLD: 0.9 % (ref 0–0.6)
IMM GRANULOCYTES NFR BLD: 3.6 % (ref 0–0.6)
INR PPP: 1.14 (ref 0.9–1.1)
INR PPP: 1.22 (ref 0.9–1.1)
INR PPP: 1.27 (ref 0.9–1.1)
KETONES UR QL STRIP: ABNORMAL
KETONES UR QL STRIP: NEGATIVE
LEUKOCYTE ESTERASE UR QL STRIP.AUTO: ABNORMAL
LEUKOCYTE ESTERASE UR QL STRIP.AUTO: ABNORMAL
LIPASE SERPL-CCNC: 94 U/L (ref 23–300)
LYMPHOCYTES # BLD AUTO: 0.94 10*3/MM3 (ref 0.6–3.4)
LYMPHOCYTES # BLD AUTO: 0.94 10*3/MM3 (ref 0.6–3.4)
LYMPHOCYTES # BLD AUTO: 1.01 10*3/MM3 (ref 0.6–3.4)
LYMPHOCYTES # BLD AUTO: 1.16 10*3/MM3 (ref 0.6–3.4)
LYMPHOCYTES # BLD AUTO: 1.45 10*3/MM3 (ref 0.6–3.4)
LYMPHOCYTES # BLD AUTO: 2.07 10*3/MM3 (ref 0.6–3.4)
LYMPHOCYTES NFR BLD AUTO: 11.3 % (ref 10–50)
LYMPHOCYTES NFR BLD AUTO: 12.4 % (ref 10–50)
LYMPHOCYTES NFR BLD AUTO: 18.1 % (ref 10–50)
LYMPHOCYTES NFR BLD AUTO: 7.9 % (ref 10–50)
LYMPHOCYTES NFR BLD AUTO: 8.1 % (ref 10–50)
LYMPHOCYTES NFR BLD AUTO: 9 % (ref 10–50)
MAGNESIUM SERPL-MCNC: 1.4 MG/DL (ref 1.6–2.3)
MAGNESIUM SERPL-MCNC: 1.5 MG/DL (ref 1.6–2.3)
MAGNESIUM SERPL-MCNC: 1.5 MG/DL (ref 1.6–2.3)
MAGNESIUM SERPL-MCNC: 1.6 MG/DL (ref 1.6–2.3)
MAGNESIUM SERPL-MCNC: 1.7 MG/DL (ref 1.6–2.3)
MAGNESIUM SERPL-MCNC: 1.9 MG/DL (ref 1.6–2.3)
MAGNESIUM SERPL-MCNC: 2.1 MG/DL (ref 1.6–2.3)
MAXIMAL PREDICTED HEART RATE: 127 BPM
MCH RBC QN AUTO: 30.9 PG (ref 27–31)
MCH RBC QN AUTO: 30.9 PG (ref 27–31)
MCH RBC QN AUTO: 31 PG (ref 27–31)
MCH RBC QN AUTO: 31.1 PG (ref 27–31)
MCH RBC QN AUTO: 31.7 PG (ref 27–31)
MCH RBC QN AUTO: 32.7 PG (ref 27–31)
MCHC RBC AUTO-ENTMCNC: 31.6 G/DL (ref 30–37)
MCHC RBC AUTO-ENTMCNC: 32.8 G/DL (ref 30–37)
MCHC RBC AUTO-ENTMCNC: 33.1 G/DL (ref 30–37)
MCHC RBC AUTO-ENTMCNC: 33.1 G/DL (ref 30–37)
MCHC RBC AUTO-ENTMCNC: 33.2 G/DL (ref 30–37)
MCHC RBC AUTO-ENTMCNC: 33.7 G/DL (ref 30–37)
MCV RBC AUTO: 103.4 FL (ref 81–99)
MCV RBC AUTO: 91.5 FL (ref 81–99)
MCV RBC AUTO: 93.3 FL (ref 81–99)
MCV RBC AUTO: 93.8 FL (ref 81–99)
MCV RBC AUTO: 94 FL (ref 81–99)
MCV RBC AUTO: 94.5 FL (ref 81–99)
MCV RBC AUTO: 94.8 FL (ref 81–99)
MCV RBC AUTO: 96.6 FL (ref 81–99)
MONOCYTES # BLD AUTO: 1 10*3/MM3 (ref 0–0.9)
MONOCYTES # BLD AUTO: 1.02 10*3/MM3 (ref 0–0.9)
MONOCYTES # BLD AUTO: 1.04 10*3/MM3 (ref 0–0.9)
MONOCYTES # BLD AUTO: 1.07 10*3/MM3 (ref 0–0.9)
MONOCYTES # BLD AUTO: 1.09 10*3/MM3 (ref 0–0.9)
MONOCYTES # BLD AUTO: 1.32 10*3/MM3 (ref 0–0.9)
MONOCYTES NFR BLD AUTO: 10.4 % (ref 0–12)
MONOCYTES NFR BLD AUTO: 10.6 % (ref 0–12)
MONOCYTES NFR BLD AUTO: 8.6 % (ref 0–12)
MONOCYTES NFR BLD AUTO: 9.1 % (ref 0–12)
MONOCYTES NFR BLD AUTO: 9.2 % (ref 0–12)
MONOCYTES NFR BLD AUTO: 9.7 % (ref 0–12)
MUCOUS THREADS URNS QL MICRO: ABNORMAL /HPF
NEUTROPHILS # BLD AUTO: 10.28 10*3/MM3 (ref 2–6.9)
NEUTROPHILS # BLD AUTO: 7.86 10*3/MM3 (ref 2–6.9)
NEUTROPHILS # BLD AUTO: 7.89 10*3/MM3 (ref 2–6.9)
NEUTROPHILS # BLD AUTO: 8.38 10*3/MM3 (ref 2–6.9)
NEUTROPHILS # BLD AUTO: 9.06 10*3/MM3 (ref 2–6.9)
NEUTROPHILS # BLD AUTO: 9.5 10*3/MM3 (ref 2–6.9)
NEUTROPHILS NFR BLD AUTO: 68.5 % (ref 37–80)
NEUTROPHILS NFR BLD AUTO: 76.7 % (ref 37–80)
NEUTROPHILS NFR BLD AUTO: 77.5 % (ref 37–80)
NEUTROPHILS NFR BLD AUTO: 79.9 % (ref 37–80)
NEUTROPHILS NFR BLD AUTO: 80.8 % (ref 37–80)
NEUTROPHILS NFR BLD AUTO: 81.7 % (ref 37–80)
NITRITE UR QL STRIP: NEGATIVE
NITRITE UR QL STRIP: NEGATIVE
NRBC BLD MANUAL-RTO: 0 /100 WBC (ref 0–0)
OSMOLALITY SERPL: 258 MOSMOL/KG (ref 280–301)
OSMOLALITY UR: 376 MOSMOL/KG
PH UR STRIP.AUTO: 5.5 [PH] (ref 5–8)
PH UR STRIP.AUTO: 6 [PH] (ref 5–8)
PHOSPHATE SERPL-MCNC: 2.5 MG/DL (ref 2.5–4.5)
PLATELET # BLD AUTO: 270 10*3/MM3 (ref 130–400)
PLATELET # BLD AUTO: 314 10*3/MM3 (ref 130–400)
PLATELET # BLD AUTO: 316 10*3/MM3 (ref 130–400)
PLATELET # BLD AUTO: 319 10*3/MM3 (ref 130–400)
PLATELET # BLD AUTO: 320 10*3/MM3 (ref 130–400)
PLATELET # BLD AUTO: 321 10*3/MM3 (ref 130–400)
PLATELET # BLD AUTO: 327 10*3/MM3 (ref 130–400)
PLATELET # BLD AUTO: 345 10*3/MM3 (ref 130–400)
PMV BLD AUTO: 10 FL (ref 6–12)
PMV BLD AUTO: 10.1 FL (ref 6–12)
PMV BLD AUTO: 10.1 FL (ref 6–12)
PMV BLD AUTO: 10.2 FL (ref 6–12)
PMV BLD AUTO: 10.3 FL (ref 6–12)
PMV BLD AUTO: 9.8 FL (ref 6–12)
PMV BLD AUTO: 9.8 FL (ref 6–12)
POTASSIUM BLD-SCNC: 2.7 MMOL/L (ref 3.5–5.1)
POTASSIUM BLD-SCNC: 3 MMOL/L (ref 3.5–5.1)
POTASSIUM BLD-SCNC: 3.1 MMOL/L (ref 3.5–5.1)
POTASSIUM BLD-SCNC: 3.7 MMOL/L (ref 3.5–5.1)
POTASSIUM BLD-SCNC: 3.8 MMOL/L (ref 3.5–5.1)
POTASSIUM BLD-SCNC: 3.9 MMOL/L (ref 3.5–5.1)
POTASSIUM BLD-SCNC: 4 MMOL/L (ref 3.5–5.1)
POTASSIUM BLD-SCNC: 4.1 MMOL/L (ref 3.5–5.1)
POTASSIUM BLD-SCNC: 4.1 MMOL/L (ref 3.5–5.1)
POTASSIUM BLD-SCNC: 4.3 MMOL/L (ref 3.5–5.1)
POTASSIUM BLD-SCNC: 4.6 MMOL/L (ref 3.5–5.1)
POTASSIUM SERPL-SCNC: 4.3 MMOL/L (ref 3.5–5.1)
PROT SERPL-MCNC: 5.1 G/DL (ref 6.3–8.2)
PROT SERPL-MCNC: 5.4 G/DL (ref 6.3–8.2)
PROT SERPL-MCNC: 5.7 G/DL (ref 6.3–8.2)
PROT SERPL-MCNC: 6.4 G/DL (ref 6.3–8.2)
PROT SERPL-MCNC: 6.6 G/DL (ref 6.3–8.2)
PROT SERPL-MCNC: 7.6 G/DL (ref 6.3–8.2)
PROT UR QL STRIP: ABNORMAL
PROT UR QL STRIP: NEGATIVE
PROTHROMBIN TIME: 12.7 SECONDS (ref 9.3–12.1)
PROTHROMBIN TIME: 13.6 SECONDS (ref 9.3–12.1)
PROTHROMBIN TIME: 14.1 SECONDS (ref 9.3–12.1)
RBC # BLD AUTO: 3.55 10*6/MM3 (ref 4.2–5.4)
RBC # BLD AUTO: 3.76 10*6/MM3 (ref 4.2–5.4)
RBC # BLD AUTO: 3.88 10*6/MM3 (ref 4.2–5.4)
RBC # BLD AUTO: 4 10*6/MM3 (ref 4.2–5.4)
RBC # BLD AUTO: 4.02 10*6/MM3 (ref 4.2–5.4)
RBC # BLD AUTO: 4.21 10*6/MM3 (ref 4.2–5.4)
RBC # BLD AUTO: 4.24 10*6/MM3 (ref 4.2–5.4)
RBC # BLD AUTO: 4.34 10*6/MM3 (ref 4.2–5.4)
RBC # UR: ABNORMAL /HPF
RBC # UR: ABNORMAL /HPF
REF LAB TEST METHOD: ABNORMAL
REF LAB TEST METHOD: ABNORMAL
SODIUM BLD-SCNC: 124 MMOL/L (ref 137–145)
SODIUM BLD-SCNC: 124 MMOL/L (ref 137–145)
SODIUM BLD-SCNC: 125 MMOL/L (ref 137–145)
SODIUM BLD-SCNC: 126 MMOL/L (ref 137–145)
SODIUM BLD-SCNC: 127 MMOL/L (ref 137–145)
SODIUM BLD-SCNC: 127 MMOL/L (ref 137–145)
SODIUM BLD-SCNC: 128 MMOL/L (ref 137–145)
SODIUM BLD-SCNC: 129 MMOL/L (ref 137–145)
SODIUM BLD-SCNC: 129 MMOL/L (ref 137–145)
SODIUM BLD-SCNC: 130 MMOL/L (ref 137–145)
SODIUM BLD-SCNC: 132 MMOL/L (ref 137–145)
SODIUM BLD-SCNC: 133 MMOL/L (ref 137–145)
SODIUM BLD-SCNC: 133 MMOL/L (ref 137–145)
SODIUM BLD-SCNC: 134 MMOL/L (ref 137–145)
SODIUM SERPL-SCNC: 133 MMOL/L (ref 137–145)
SODIUM UR-SCNC: 115 MMOL/L (ref 30–90)
SP GR UR STRIP: 1.01 (ref 1–1.03)
SP GR UR STRIP: 1.02 (ref 1–1.03)
SQUAMOUS #/AREA URNS HPF: ABNORMAL /HPF
SQUAMOUS #/AREA URNS HPF: ABNORMAL /HPF
STRESS TARGET HR: 108 BPM
T4 SERPL-MCNC: 6.9 UG/DL (ref 4.5–12)
TROPONIN I SERPL-MCNC: 0.04 NG/ML (ref 0–0.03)
TROPONIN I SERPL-MCNC: 0.05 NG/ML (ref 0–0.03)
TSH SERPL DL<=0.005 MIU/L-ACNC: 3.35 MIU/ML (ref 0.47–4.68)
TSH SERPL DL<=0.05 MIU/L-ACNC: 1.11 MIU/ML (ref 0.47–4.68)
TSH SERPL DL<=0.05 MIU/L-ACNC: 3.13 MIU/ML (ref 0.47–4.68)
URATE SERPL-MCNC: 3.7 MG/DL (ref 2.5–8.5)
UROBILINOGEN UR QL STRIP: ABNORMAL
UROBILINOGEN UR QL STRIP: ABNORMAL
VANCOMYCIN TROUGH SERPL-MCNC: 10.5 MCG/ML (ref 10–20)
VIT B12 BLD-MCNC: >1000 PG/ML (ref 239–931)
VIT B12 SERPL-MCNC: >1000 PG/ML (ref 239–931)
WBC # BLD AUTO: 7.83 10*3/MM3 (ref 4.8–10.8)
WBC NRBC COR # BLD: 10.28 10*3/MM3 (ref 4.8–10.8)
WBC NRBC COR # BLD: 10.48 10*3/MM3 (ref 4.8–10.8)
WBC NRBC COR # BLD: 11.46 10*3/MM3 (ref 4.8–10.8)
WBC NRBC COR # BLD: 11.62 10*3/MM3 (ref 4.8–10.8)
WBC NRBC COR # BLD: 11.68 10*3/MM3 (ref 4.8–10.8)
WBC NRBC COR # BLD: 11.9 10*3/MM3 (ref 4.8–10.8)
WBC NRBC COR # BLD: 12.72 10*3/MM3 (ref 4.8–10.8)
WBC UR QL AUTO: ABNORMAL /HPF
WBC UR QL AUTO: ABNORMAL /HPF

## 2018-01-01 PROCEDURE — 85025 COMPLETE CBC W/AUTO DIFF WBC: CPT | Performed by: PHYSICIAN ASSISTANT

## 2018-01-01 PROCEDURE — 83735 ASSAY OF MAGNESIUM: CPT | Performed by: FAMILY MEDICINE

## 2018-01-01 PROCEDURE — 99310 SBSQ NF CARE HIGH MDM 45: CPT | Performed by: FAMILY MEDICINE

## 2018-01-01 PROCEDURE — 85610 PROTHROMBIN TIME: CPT | Performed by: FAMILY MEDICINE

## 2018-01-01 PROCEDURE — 25010000002 CEFTRIAXONE: Performed by: FAMILY MEDICINE

## 2018-01-01 PROCEDURE — 93005 ELECTROCARDIOGRAM TRACING: CPT | Performed by: PHYSICIAN ASSISTANT

## 2018-01-01 PROCEDURE — 80048 BASIC METABOLIC PNL TOTAL CA: CPT | Performed by: FAMILY MEDICINE

## 2018-01-01 PROCEDURE — 84443 ASSAY THYROID STIM HORMONE: CPT | Performed by: PHYSICIAN ASSISTANT

## 2018-01-01 PROCEDURE — 83935 ASSAY OF URINE OSMOLALITY: CPT | Performed by: INTERNAL MEDICINE

## 2018-01-01 PROCEDURE — 96360 HYDRATION IV INFUSION INIT: CPT

## 2018-01-01 PROCEDURE — 99308 SBSQ NF CARE LOW MDM 20: CPT | Performed by: NURSE PRACTITIONER

## 2018-01-01 PROCEDURE — 85025 COMPLETE CBC W/AUTO DIFF WBC: CPT | Performed by: HOSPITALIST

## 2018-01-01 PROCEDURE — G0009 ADMIN PNEUMOCOCCAL VACCINE: HCPCS | Performed by: FAMILY MEDICINE

## 2018-01-01 PROCEDURE — G0439 PPPS, SUBSEQ VISIT: HCPCS | Performed by: FAMILY MEDICINE

## 2018-01-01 PROCEDURE — 25010000002 CEFTRIAXONE: Performed by: PHYSICIAN ASSISTANT

## 2018-01-01 PROCEDURE — 80053 COMPREHEN METABOLIC PANEL: CPT | Performed by: FAMILY MEDICINE

## 2018-01-01 PROCEDURE — 99285 EMERGENCY DEPT VISIT HI MDM: CPT

## 2018-01-01 PROCEDURE — 99309 SBSQ NF CARE MODERATE MDM 30: CPT | Performed by: FAMILY MEDICINE

## 2018-01-01 PROCEDURE — 87086 URINE CULTURE/COLONY COUNT: CPT | Performed by: EMERGENCY MEDICINE

## 2018-01-01 PROCEDURE — 71045 X-RAY EXAM CHEST 1 VIEW: CPT

## 2018-01-01 PROCEDURE — 99232 SBSQ HOSP IP/OBS MODERATE 35: CPT | Performed by: INTERNAL MEDICINE

## 2018-01-01 PROCEDURE — 85610 PROTHROMBIN TIME: CPT | Performed by: PHYSICIAN ASSISTANT

## 2018-01-01 PROCEDURE — 99309 SBSQ NF CARE MODERATE MDM 30: CPT | Performed by: INTERNAL MEDICINE

## 2018-01-01 PROCEDURE — P9612 CATHETERIZE FOR URINE SPEC: HCPCS

## 2018-01-01 PROCEDURE — 99232 SBSQ HOSP IP/OBS MODERATE 35: CPT | Performed by: FAMILY MEDICINE

## 2018-01-01 PROCEDURE — 84484 ASSAY OF TROPONIN QUANT: CPT | Performed by: EMERGENCY MEDICINE

## 2018-01-01 PROCEDURE — 85730 THROMBOPLASTIN TIME PARTIAL: CPT | Performed by: PHYSICIAN ASSISTANT

## 2018-01-01 PROCEDURE — 25010000002 FUROSEMIDE PER 20 MG: Performed by: INTERNAL MEDICINE

## 2018-01-01 PROCEDURE — 85025 COMPLETE CBC W/AUTO DIFF WBC: CPT | Performed by: FAMILY MEDICINE

## 2018-01-01 PROCEDURE — 84443 ASSAY THYROID STIM HORMONE: CPT | Performed by: HOSPITALIST

## 2018-01-01 PROCEDURE — 80048 BASIC METABOLIC PNL TOTAL CA: CPT | Performed by: HOSPITALIST

## 2018-01-01 PROCEDURE — 99223 1ST HOSP IP/OBS HIGH 75: CPT | Performed by: HOSPITALIST

## 2018-01-01 PROCEDURE — 81001 URINALYSIS AUTO W/SCOPE: CPT | Performed by: PHYSICIAN ASSISTANT

## 2018-01-01 PROCEDURE — 80048 BASIC METABOLIC PNL TOTAL CA: CPT | Performed by: INTERNAL MEDICINE

## 2018-01-01 PROCEDURE — 80053 COMPREHEN METABOLIC PANEL: CPT | Performed by: PHYSICIAN ASSISTANT

## 2018-01-01 PROCEDURE — G8997 SWALLOW GOAL STATUS: HCPCS

## 2018-01-01 PROCEDURE — G8996 SWALLOW CURRENT STATUS: HCPCS

## 2018-01-01 PROCEDURE — 73630 X-RAY EXAM OF FOOT: CPT

## 2018-01-01 PROCEDURE — 83690 ASSAY OF LIPASE: CPT | Performed by: PHYSICIAN ASSISTANT

## 2018-01-01 PROCEDURE — 82607 VITAMIN B-12: CPT | Performed by: HOSPITALIST

## 2018-01-01 PROCEDURE — 70450 CT HEAD/BRAIN W/O DYE: CPT

## 2018-01-01 PROCEDURE — 84100 ASSAY OF PHOSPHORUS: CPT | Performed by: HOSPITALIST

## 2018-01-01 PROCEDURE — 25010000002 VITAMIN K1 PER 1 MG: Performed by: HOSPITALIST

## 2018-01-01 PROCEDURE — 74230 X-RAY XM SWLNG FUNCJ C+: CPT

## 2018-01-01 PROCEDURE — 97161 PT EVAL LOW COMPLEX 20 MIN: CPT

## 2018-01-01 PROCEDURE — 36415 COLL VENOUS BLD VENIPUNCTURE: CPT

## 2018-01-01 PROCEDURE — 80053 COMPREHEN METABOLIC PANEL: CPT | Performed by: EMERGENCY MEDICINE

## 2018-01-01 PROCEDURE — 97116 GAIT TRAINING THERAPY: CPT

## 2018-01-01 PROCEDURE — 85025 COMPLETE CBC W/AUTO DIFF WBC: CPT | Performed by: EMERGENCY MEDICINE

## 2018-01-01 PROCEDURE — 84484 ASSAY OF TROPONIN QUANT: CPT | Performed by: STUDENT IN AN ORGANIZED HEALTH CARE EDUCATION/TRAINING PROGRAM

## 2018-01-01 PROCEDURE — 97110 THERAPEUTIC EXERCISES: CPT

## 2018-01-01 PROCEDURE — 83735 ASSAY OF MAGNESIUM: CPT | Performed by: INTERNAL MEDICINE

## 2018-01-01 PROCEDURE — 83605 ASSAY OF LACTIC ACID: CPT | Performed by: PHYSICIAN ASSISTANT

## 2018-01-01 PROCEDURE — 83930 ASSAY OF BLOOD OSMOLALITY: CPT | Performed by: INTERNAL MEDICINE

## 2018-01-01 PROCEDURE — G0180 MD CERTIFICATION HHA PATIENT: HCPCS | Performed by: FAMILY MEDICINE

## 2018-01-01 PROCEDURE — G8998 SWALLOW D/C STATUS: HCPCS

## 2018-01-01 PROCEDURE — 82962 GLUCOSE BLOOD TEST: CPT

## 2018-01-01 PROCEDURE — 80202 ASSAY OF VANCOMYCIN: CPT | Performed by: INTERNAL MEDICINE

## 2018-01-01 PROCEDURE — 87186 SC STD MICRODIL/AGAR DIL: CPT | Performed by: PHYSICIAN ASSISTANT

## 2018-01-01 PROCEDURE — 25010000002 MAGNESIUM SULFATE 2 GM/50ML SOLUTION: Performed by: FAMILY MEDICINE

## 2018-01-01 PROCEDURE — 87186 SC STD MICRODIL/AGAR DIL: CPT | Performed by: EMERGENCY MEDICINE

## 2018-01-01 PROCEDURE — 92611 MOTION FLUOROSCOPY/SWALLOW: CPT

## 2018-01-01 PROCEDURE — 25010000002 ONDANSETRON PER 1 MG: Performed by: PHYSICIAN ASSISTANT

## 2018-01-01 PROCEDURE — 99306 1ST NF CARE HIGH MDM 50: CPT | Performed by: INTERNAL MEDICINE

## 2018-01-01 PROCEDURE — 97165 OT EVAL LOW COMPLEX 30 MIN: CPT

## 2018-01-01 PROCEDURE — 87040 BLOOD CULTURE FOR BACTERIA: CPT | Performed by: PHYSICIAN ASSISTANT

## 2018-01-01 PROCEDURE — 83735 ASSAY OF MAGNESIUM: CPT | Performed by: PHYSICIAN ASSISTANT

## 2018-01-01 PROCEDURE — 25810000003 SODIUM CHLORIDE 0.9 % WITH KCL 20 MEQ 20-0.9 MEQ/L-% SOLUTION: Performed by: FAMILY MEDICINE

## 2018-01-01 PROCEDURE — 97530 THERAPEUTIC ACTIVITIES: CPT

## 2018-01-01 PROCEDURE — 87077 CULTURE AEROBIC IDENTIFY: CPT | Performed by: EMERGENCY MEDICINE

## 2018-01-01 PROCEDURE — 93306 TTE W/DOPPLER COMPLETE: CPT

## 2018-01-01 PROCEDURE — 25010000002 FUROSEMIDE PER 20 MG: Performed by: FAMILY MEDICINE

## 2018-01-01 PROCEDURE — 99239 HOSP IP/OBS DSCHRG MGMT >30: CPT | Performed by: INTERNAL MEDICINE

## 2018-01-01 PROCEDURE — 84300 ASSAY OF URINE SODIUM: CPT | Performed by: INTERNAL MEDICINE

## 2018-01-01 PROCEDURE — 99213 OFFICE O/P EST LOW 20 MIN: CPT | Performed by: FAMILY MEDICINE

## 2018-01-01 PROCEDURE — 92610 EVALUATE SWALLOWING FUNCTION: CPT

## 2018-01-01 PROCEDURE — 25010000002 HYDRALAZINE PER 20 MG: Performed by: FAMILY MEDICINE

## 2018-01-01 PROCEDURE — 87077 CULTURE AEROBIC IDENTIFY: CPT | Performed by: PHYSICIAN ASSISTANT

## 2018-01-01 PROCEDURE — 85027 COMPLETE CBC AUTOMATED: CPT | Performed by: FAMILY MEDICINE

## 2018-01-01 PROCEDURE — 81001 URINALYSIS AUTO W/SCOPE: CPT | Performed by: EMERGENCY MEDICINE

## 2018-01-01 PROCEDURE — 87086 URINE CULTURE/COLONY COUNT: CPT | Performed by: PHYSICIAN ASSISTANT

## 2018-01-01 PROCEDURE — 96361 HYDRATE IV INFUSION ADD-ON: CPT

## 2018-01-01 PROCEDURE — 97535 SELF CARE MNGMENT TRAINING: CPT

## 2018-01-01 PROCEDURE — 25010000003 POTASSIUM CHLORIDE 10 MEQ/100ML SOLUTION: Performed by: FAMILY MEDICINE

## 2018-01-01 PROCEDURE — 93005 ELECTROCARDIOGRAM TRACING: CPT | Performed by: EMERGENCY MEDICINE

## 2018-01-01 PROCEDURE — 84550 ASSAY OF BLOOD/URIC ACID: CPT | Performed by: INTERNAL MEDICINE

## 2018-01-01 PROCEDURE — 90732 PPSV23 VACC 2 YRS+ SUBQ/IM: CPT | Performed by: FAMILY MEDICINE

## 2018-01-01 PROCEDURE — 84295 ASSAY OF SERUM SODIUM: CPT | Performed by: INTERNAL MEDICINE

## 2018-01-01 RX ORDER — CEFDINIR 300 MG/1
300 CAPSULE ORAL EVERY 12 HOURS SCHEDULED
Qty: 7 CAPSULE | Refills: 0 | Status: SHIPPED | OUTPATIENT
Start: 2018-01-01 | End: 2018-01-01

## 2018-01-01 RX ORDER — CEFDINIR 300 MG/1
300 CAPSULE ORAL EVERY 12 HOURS SCHEDULED
Status: DISCONTINUED | OUTPATIENT
Start: 2018-01-01 | End: 2018-01-01 | Stop reason: HOSPADM

## 2018-01-01 RX ORDER — HYDROCODONE BITARTRATE AND ACETAMINOPHEN 5; 325 MG/1; MG/1
1 TABLET ORAL EVERY 6 HOURS
Qty: 120 TABLET | Refills: 0 | Status: SHIPPED | OUTPATIENT
Start: 2018-01-01 | End: 2019-01-01 | Stop reason: SDUPTHER

## 2018-01-01 RX ORDER — L.ACID,PARA/B.BIFIDUM/S.THERM 8B CELL
1 CAPSULE ORAL 3 TIMES DAILY
Status: DISCONTINUED | OUTPATIENT
Start: 2018-01-01 | End: 2018-01-01 | Stop reason: HOSPADM

## 2018-01-01 RX ORDER — ACYCLOVIR 200 MG/5ML
400 SUSPENSION ORAL 3 TIMES DAILY
Qty: 300 ML | Refills: 0
Start: 2018-01-01 | End: 2018-01-01

## 2018-01-01 RX ORDER — TOLVAPTAN 30 MG/1
15 TABLET ORAL ONCE
Status: DISCONTINUED | OUTPATIENT
Start: 2018-01-01 | End: 2018-01-01

## 2018-01-01 RX ORDER — FUROSEMIDE 10 MG/ML
20 INJECTION INTRAMUSCULAR; INTRAVENOUS ONCE
Status: COMPLETED | OUTPATIENT
Start: 2018-01-01 | End: 2018-01-01

## 2018-01-01 RX ORDER — IPRATROPIUM BROMIDE AND ALBUTEROL SULFATE 2.5; .5 MG/3ML; MG/3ML
3 SOLUTION RESPIRATORY (INHALATION) EVERY 4 HOURS PRN
Qty: 360 ML
Start: 2018-01-01

## 2018-01-01 RX ORDER — LEVOTHYROXINE SODIUM 112 UG/1
112 TABLET ORAL
Status: DISCONTINUED | OUTPATIENT
Start: 2018-01-01 | End: 2018-01-01 | Stop reason: HOSPADM

## 2018-01-01 RX ORDER — CARVEDILOL 3.12 MG/1
TABLET ORAL
Qty: 60 TABLET | Refills: 5 | Status: SHIPPED | OUTPATIENT
Start: 2018-01-01 | End: 2018-01-01

## 2018-01-01 RX ORDER — ACETAMINOPHEN 325 MG/1
650 TABLET ORAL EVERY 4 HOURS PRN
Status: DISCONTINUED | OUTPATIENT
Start: 2018-01-01 | End: 2018-01-01 | Stop reason: HOSPADM

## 2018-01-01 RX ORDER — HYDROCODONE BITARTRATE AND ACETAMINOPHEN 5; 325 MG/1; MG/1
1 TABLET ORAL EVERY 8 HOURS PRN
Qty: 90 TABLET | Refills: 0 | Status: SHIPPED | OUTPATIENT
Start: 2018-01-01 | End: 2018-01-01 | Stop reason: SDUPTHER

## 2018-01-01 RX ORDER — POTASSIUM CHLORIDE 1.5 G/1.77G
20 POWDER, FOR SOLUTION ORAL DAILY
Status: DISCONTINUED | OUTPATIENT
Start: 2018-01-01 | End: 2018-01-01 | Stop reason: HOSPADM

## 2018-01-01 RX ORDER — DIAPER,BRIEF,INFANT-TODD,DISP
EACH MISCELLANEOUS EVERY 8 HOURS SCHEDULED
Status: DISCONTINUED | OUTPATIENT
Start: 2018-01-01 | End: 2018-01-01 | Stop reason: HOSPADM

## 2018-01-01 RX ORDER — PAROXETINE HYDROCHLORIDE 20 MG/1
TABLET, FILM COATED ORAL
Qty: 30 TABLET | Refills: 5 | Status: SHIPPED | OUTPATIENT
Start: 2018-01-01 | End: 2018-01-01 | Stop reason: SDUPTHER

## 2018-01-01 RX ORDER — POTASSIUM CHLORIDE 1.5 G/1.77G
40 POWDER, FOR SOLUTION ORAL
Status: DISCONTINUED | OUTPATIENT
Start: 2018-01-01 | End: 2018-01-01

## 2018-01-01 RX ORDER — SODIUM CHLORIDE 1000 MG
1 TABLET, SOLUBLE MISCELLANEOUS
Status: DISCONTINUED | OUTPATIENT
Start: 2018-01-01 | End: 2018-01-01

## 2018-01-01 RX ORDER — HYDROCODONE BITARTRATE AND ACETAMINOPHEN 5; 325 MG/1; MG/1
1 TABLET ORAL EVERY 8 HOURS PRN
Status: DISCONTINUED | OUTPATIENT
Start: 2018-01-01 | End: 2018-01-01 | Stop reason: HOSPADM

## 2018-01-01 RX ORDER — CEFUROXIME AXETIL 250 MG/1
500 TABLET ORAL EVERY 12 HOURS SCHEDULED
Status: COMPLETED | OUTPATIENT
Start: 2018-01-01 | End: 2018-01-01

## 2018-01-01 RX ORDER — HYDROCODONE BITARTRATE AND ACETAMINOPHEN 5; 325 MG/1; MG/1
1 TABLET ORAL EVERY 6 HOURS
Qty: 120 TABLET | Refills: 0 | Status: SHIPPED | OUTPATIENT
Start: 2018-01-01 | End: 2018-01-01 | Stop reason: SDUPTHER

## 2018-01-01 RX ORDER — SODIUM CHLORIDE 1000 MG
1 TABLET, SOLUBLE MISCELLANEOUS
Status: DISCONTINUED | OUTPATIENT
Start: 2018-01-01 | End: 2018-01-01 | Stop reason: HOSPADM

## 2018-01-01 RX ORDER — PHYTONADIONE 10 MG/ML
2.5 INJECTION, EMULSION INTRAMUSCULAR; INTRAVENOUS; SUBCUTANEOUS ONCE
Status: COMPLETED | OUTPATIENT
Start: 2018-01-01 | End: 2018-01-01

## 2018-01-01 RX ORDER — MULTIPLE VITAMINS W/ MINERALS TAB 9MG-400MCG
1 TAB ORAL DAILY
Status: DISCONTINUED | OUTPATIENT
Start: 2018-01-01 | End: 2018-01-01 | Stop reason: HOSPADM

## 2018-01-01 RX ORDER — PAROXETINE HYDROCHLORIDE 20 MG/1
20 TABLET, FILM COATED ORAL DAILY
Status: DISCONTINUED | OUTPATIENT
Start: 2018-01-01 | End: 2018-01-01

## 2018-01-01 RX ORDER — SODIUM CHLORIDE 0.9 % (FLUSH) 0.9 %
10 SYRINGE (ML) INJECTION AS NEEDED
Status: DISCONTINUED | OUTPATIENT
Start: 2018-01-01 | End: 2018-01-01 | Stop reason: HOSPADM

## 2018-01-01 RX ORDER — SODIUM CHLORIDE 9 MG/ML
75 INJECTION, SOLUTION INTRAVENOUS CONTINUOUS
Status: DISCONTINUED | OUTPATIENT
Start: 2018-01-01 | End: 2018-01-01

## 2018-01-01 RX ORDER — IPRATROPIUM BROMIDE AND ALBUTEROL SULFATE 2.5; .5 MG/3ML; MG/3ML
3 SOLUTION RESPIRATORY (INHALATION) EVERY 4 HOURS PRN
Status: DISCONTINUED | OUTPATIENT
Start: 2018-01-01 | End: 2018-01-01 | Stop reason: HOSPADM

## 2018-01-01 RX ORDER — LOSARTAN POTASSIUM 25 MG/1
25 TABLET ORAL
Status: DISCONTINUED | OUTPATIENT
Start: 2018-01-01 | End: 2018-01-01 | Stop reason: HOSPADM

## 2018-01-01 RX ORDER — SODIUM CHLORIDE AND POTASSIUM CHLORIDE 150; 900 MG/100ML; MG/100ML
50 INJECTION, SOLUTION INTRAVENOUS CONTINUOUS
Status: DISCONTINUED | OUTPATIENT
Start: 2018-01-01 | End: 2018-01-01

## 2018-01-01 RX ORDER — DOCUSATE SODIUM 100 MG/1
100 CAPSULE, LIQUID FILLED ORAL DAILY PRN
Status: DISCONTINUED | OUTPATIENT
Start: 2018-01-01 | End: 2018-01-01 | Stop reason: HOSPADM

## 2018-01-01 RX ORDER — POLYETHYLENE GLYCOL 3350 17 G/17G
POWDER, FOR SOLUTION ORAL
COMMUNITY
Start: 2018-01-01 | End: 2018-01-01 | Stop reason: SDUPTHER

## 2018-01-01 RX ORDER — 3% SODIUM CHLORIDE 3 G/100ML
25 INJECTION, SOLUTION INTRAVENOUS ONCE
Status: COMPLETED | OUTPATIENT
Start: 2018-01-01 | End: 2018-01-01

## 2018-01-01 RX ORDER — HYDROCODONE BITARTRATE AND ACETAMINOPHEN 5; 325 MG/1; MG/1
1 TABLET ORAL EVERY 8 HOURS PRN
Qty: 12 TABLET | Refills: 0 | Status: SHIPPED | OUTPATIENT
Start: 2018-01-01 | End: 2018-01-01 | Stop reason: SDUPTHER

## 2018-01-01 RX ORDER — FUROSEMIDE 20 MG/1
20 TABLET ORAL DAILY
Status: DISCONTINUED | OUTPATIENT
Start: 2018-01-01 | End: 2018-01-01 | Stop reason: HOSPADM

## 2018-01-01 RX ORDER — WHITE PETROLATUM 450 MG/G
1 STICK TOPICAL AS NEEDED
Status: DISCONTINUED | OUTPATIENT
Start: 2018-01-01 | End: 2018-01-01 | Stop reason: HOSPADM

## 2018-01-01 RX ORDER — PANTOPRAZOLE SODIUM 40 MG/1
40 TABLET, DELAYED RELEASE ORAL DAILY
Qty: 90 TABLET | Refills: 1 | Status: SHIPPED | OUTPATIENT
Start: 2018-01-01

## 2018-01-01 RX ORDER — POLYETHYLENE GLYCOL 3350 17 G/17G
POWDER, FOR SOLUTION ORAL
Refills: 5 | COMMUNITY
Start: 2018-01-01 | End: 2018-01-01 | Stop reason: SDUPTHER

## 2018-01-01 RX ORDER — DOCUSATE SODIUM 250 MG
250 CAPSULE ORAL 2 TIMES DAILY PRN
COMMUNITY

## 2018-01-01 RX ORDER — ONDANSETRON 2 MG/ML
4 INJECTION INTRAMUSCULAR; INTRAVENOUS ONCE
Status: COMPLETED | OUTPATIENT
Start: 2018-01-01 | End: 2018-01-01

## 2018-01-01 RX ORDER — MUPIROCIN CALCIUM 20 MG/G
CREAM TOPICAL EVERY 12 HOURS SCHEDULED
Status: DISCONTINUED | OUTPATIENT
Start: 2018-01-01 | End: 2018-01-01 | Stop reason: HOSPADM

## 2018-01-01 RX ORDER — POTASSIUM CHLORIDE 750 MG/1
40 CAPSULE, EXTENDED RELEASE ORAL
Status: DISCONTINUED | OUTPATIENT
Start: 2018-01-01 | End: 2018-01-01

## 2018-01-01 RX ORDER — BISACODYL 10 MG
10 SUPPOSITORY, RECTAL RECTAL DAILY PRN
Status: DISCONTINUED | OUTPATIENT
Start: 2018-01-01 | End: 2018-01-01 | Stop reason: HOSPADM

## 2018-01-01 RX ORDER — ONDANSETRON 2 MG/ML
4 INJECTION INTRAMUSCULAR; INTRAVENOUS EVERY 6 HOURS PRN
Status: DISCONTINUED | OUTPATIENT
Start: 2018-01-01 | End: 2018-01-01 | Stop reason: HOSPADM

## 2018-01-01 RX ORDER — FUROSEMIDE 20 MG/1
20 TABLET ORAL DAILY
Start: 2018-01-01 | End: 2019-01-01 | Stop reason: HOSPADM

## 2018-01-01 RX ORDER — HYDROCODONE BITARTRATE AND ACETAMINOPHEN 5; 325 MG/1; MG/1
1 TABLET ORAL EVERY 6 HOURS PRN
Qty: 120 TABLET | Refills: 0 | Status: SHIPPED | OUTPATIENT
Start: 2018-01-01 | End: 2018-01-01 | Stop reason: SDUPTHER

## 2018-01-01 RX ORDER — MEGESTROL ACETATE 40 MG/ML
400 SUSPENSION ORAL 2 TIMES DAILY
Start: 2018-01-01

## 2018-01-01 RX ORDER — DIAPER,BRIEF,INFANT-TODD,DISP
EACH MISCELLANEOUS EVERY 8 HOURS SCHEDULED
Start: 2018-01-01 | End: 2019-01-01 | Stop reason: HOSPADM

## 2018-01-01 RX ORDER — POTASSIUM CHLORIDE 1.5 G/1.77G
40 POWDER, FOR SOLUTION ORAL ONCE
Status: DISCONTINUED | OUTPATIENT
Start: 2018-01-01 | End: 2018-01-01

## 2018-01-01 RX ORDER — MEGESTROL ACETATE 40 MG/ML
400 SUSPENSION ORAL 2 TIMES DAILY
Status: DISCONTINUED | OUTPATIENT
Start: 2018-01-01 | End: 2018-01-01 | Stop reason: HOSPADM

## 2018-01-01 RX ORDER — CARVEDILOL 3.12 MG/1
TABLET ORAL
Refills: 5 | COMMUNITY
Start: 2018-01-01 | End: 2018-01-01

## 2018-01-01 RX ORDER — SODIUM CHLORIDE 1000 MG
1 TABLET, SOLUBLE MISCELLANEOUS
Start: 2018-01-01

## 2018-01-01 RX ORDER — POTASSIUM CHLORIDE 7.45 MG/ML
10 INJECTION INTRAVENOUS
Status: COMPLETED | OUTPATIENT
Start: 2018-01-01 | End: 2018-01-01

## 2018-01-01 RX ORDER — SODIUM CHLORIDE 0.9 % (FLUSH) 0.9 %
1-10 SYRINGE (ML) INJECTION AS NEEDED
Status: DISCONTINUED | OUTPATIENT
Start: 2018-01-01 | End: 2018-01-01 | Stop reason: HOSPADM

## 2018-01-01 RX ORDER — CARVEDILOL 3.12 MG/1
TABLET ORAL
Qty: 60 TABLET | Refills: 5 | Status: SHIPPED | OUTPATIENT
Start: 2018-01-01 | End: 2018-01-01 | Stop reason: SDUPTHER

## 2018-01-01 RX ORDER — POTASSIUM CHLORIDE 750 MG/1
40 CAPSULE, EXTENDED RELEASE ORAL ONCE
Status: COMPLETED | OUTPATIENT
Start: 2018-01-01 | End: 2018-01-01

## 2018-01-01 RX ORDER — HYDROCODONE BITARTRATE AND ACETAMINOPHEN 5; 325 MG/1; MG/1
1 TABLET ORAL EVERY 8 HOURS PRN
Qty: 10 TABLET | Refills: 0 | Status: SHIPPED | OUTPATIENT
Start: 2018-01-01 | End: 2018-01-01 | Stop reason: SDUPTHER

## 2018-01-01 RX ORDER — LEVOTHYROXINE SODIUM 112 UG/1
112 TABLET ORAL DAILY
Qty: 90 TABLET | Refills: 1 | Status: SHIPPED | OUTPATIENT
Start: 2018-01-01

## 2018-01-01 RX ORDER — HYDROCODONE BITARTRATE AND ACETAMINOPHEN 5; 325 MG/1; MG/1
TABLET ORAL
Qty: 90 TABLET | Refills: 0 | OUTPATIENT
Start: 2018-01-01

## 2018-01-01 RX ORDER — PAROXETINE HYDROCHLORIDE 20 MG/1
20 TABLET, FILM COATED ORAL DAILY
Qty: 30 TABLET | Refills: 5 | Status: SHIPPED | OUTPATIENT
Start: 2018-01-01 | End: 2018-01-01 | Stop reason: HOSPADM

## 2018-01-01 RX ORDER — MAGNESIUM SULFATE HEPTAHYDRATE 40 MG/ML
2 INJECTION, SOLUTION INTRAVENOUS ONCE
Status: COMPLETED | OUTPATIENT
Start: 2018-01-01 | End: 2018-01-01

## 2018-01-01 RX ORDER — PANTOPRAZOLE SODIUM 40 MG/1
40 TABLET, DELAYED RELEASE ORAL
Status: DISCONTINUED | OUTPATIENT
Start: 2018-01-01 | End: 2018-01-01 | Stop reason: HOSPADM

## 2018-01-01 RX ORDER — HYDROCODONE BITARTRATE AND ACETAMINOPHEN 5; 325 MG/1; MG/1
1 TABLET ORAL EVERY 6 HOURS
Qty: 120 TABLET | Refills: 0 | Status: CANCELLED | OUTPATIENT
Start: 2018-01-01

## 2018-01-01 RX ORDER — POTASSIUM CHLORIDE 1.5 G/1.77G
20 POWDER, FOR SOLUTION ORAL DAILY
Status: ON HOLD
Start: 2018-01-01 | End: 2019-01-01

## 2018-01-01 RX ORDER — ACYCLOVIR 200 MG/5ML
400 SUSPENSION ORAL 3 TIMES DAILY
Status: DISCONTINUED | OUTPATIENT
Start: 2018-01-01 | End: 2018-01-01 | Stop reason: HOSPADM

## 2018-01-01 RX ORDER — 3% SODIUM CHLORIDE 3 G/100ML
100 INJECTION, SOLUTION INTRAVENOUS ONCE
Status: COMPLETED | OUTPATIENT
Start: 2018-01-01 | End: 2018-01-01

## 2018-01-01 RX ORDER — HYDRALAZINE HYDROCHLORIDE 20 MG/ML
20 INJECTION INTRAMUSCULAR; INTRAVENOUS EVERY 6 HOURS PRN
Status: DISCONTINUED | OUTPATIENT
Start: 2018-01-01 | End: 2018-01-01 | Stop reason: HOSPADM

## 2018-01-01 RX ORDER — LOSARTAN POTASSIUM 25 MG/1
25 TABLET ORAL
Start: 2018-01-01

## 2018-01-01 RX ORDER — SODIUM CHLORIDE 9 MG/ML
100 INJECTION, SOLUTION INTRAVENOUS CONTINUOUS
Status: DISCONTINUED | OUTPATIENT
Start: 2018-01-01 | End: 2018-01-01

## 2018-01-01 RX ORDER — FAMOTIDINE 20 MG/1
20 TABLET, FILM COATED ORAL DAILY
Status: DISCONTINUED | OUTPATIENT
Start: 2018-01-01 | End: 2018-01-01 | Stop reason: HOSPADM

## 2018-01-01 RX ORDER — POLYETHYLENE GLYCOL 3350 17 G/17G
17 POWDER, FOR SOLUTION ORAL DAILY PRN
Start: 2018-01-01

## 2018-01-01 RX ORDER — HYDROCODONE BITARTRATE AND ACETAMINOPHEN 5; 325 MG/1; MG/1
1 TABLET ORAL EVERY 8 HOURS PRN
Qty: 90 TABLET | Refills: 0 | Status: SHIPPED | OUTPATIENT
Start: 2018-01-01 | End: 2018-01-01 | Stop reason: HOSPADM

## 2018-01-01 RX ORDER — ASPIRIN 81 MG/1
81 TABLET ORAL DAILY
COMMUNITY

## 2018-01-01 RX ORDER — TETRAHYDROZOLINE HCL 0.05 %
1 DROPS OPHTHALMIC (EYE) DAILY PRN
Status: ON HOLD | COMMUNITY
End: 2019-01-01

## 2018-01-01 RX ADMIN — SODIUM CHLORIDE 75 ML/HR: 9 INJECTION, SOLUTION INTRAVENOUS at 21:42

## 2018-01-01 RX ADMIN — CEFTRIAXONE 1 G: 1 INJECTION, POWDER, FOR SOLUTION INTRAMUSCULAR; INTRAVENOUS at 18:46

## 2018-01-01 RX ADMIN — LEVOTHYROXINE SODIUM 112 MCG: 112 TABLET ORAL at 05:27

## 2018-01-01 RX ADMIN — PAROXETINE HYDROCHLORIDE 20 MG: 20 TABLET, FILM COATED ORAL at 09:49

## 2018-01-01 RX ADMIN — MAGNESIUM OXIDE TAB 400 MG (241.3 MG ELEMENTAL MG) 400 MG: 400 (241.3 MG) TAB at 21:12

## 2018-01-01 RX ADMIN — Medication 1 CAPSULE: at 09:54

## 2018-01-01 RX ADMIN — SODIUM CHLORIDE 100 ML: 3 INJECTION, SOLUTION INTRAVENOUS at 11:10

## 2018-01-01 RX ADMIN — LOSARTAN POTASSIUM 25 MG: 25 TABLET, FILM COATED ORAL at 12:20

## 2018-01-01 RX ADMIN — FAMOTIDINE 20 MG: 20 TABLET ORAL at 09:50

## 2018-01-01 RX ADMIN — PANTOPRAZOLE SODIUM 40 MG: 40 TABLET, DELAYED RELEASE ORAL at 05:23

## 2018-01-01 RX ADMIN — HYDROCODONE BITARTRATE AND ACETAMINOPHEN 1 TABLET: 5; 325 TABLET ORAL at 20:17

## 2018-01-01 RX ADMIN — POTASSIUM CHLORIDE 20 MEQ: 1.5 POWDER, FOR SOLUTION ORAL at 09:53

## 2018-01-01 RX ADMIN — HYDROCODONE BITARTRATE AND ACETAMINOPHEN 1 TABLET: 5; 325 TABLET ORAL at 21:57

## 2018-01-01 RX ADMIN — SODIUM CHLORIDE TAB 1 GM 1 G: 1 TAB at 13:41

## 2018-01-01 RX ADMIN — Medication 1 CAPSULE: at 08:49

## 2018-01-01 RX ADMIN — MULTIPLE VITAMINS W/ MINERALS TAB 1 TABLET: TAB at 17:16

## 2018-01-01 RX ADMIN — SODIUM CHLORIDE TAB 1 GM 1 G: 1 TAB at 12:14

## 2018-01-01 RX ADMIN — ACYCLOVIR 400 MG: 200 SUSPENSION ORAL at 09:50

## 2018-01-01 RX ADMIN — SODIUM CHLORIDE TAB 1 GM 1 G: 1 TAB at 08:47

## 2018-01-01 RX ADMIN — MEGESTROL ACETATE 400 MG: 40 SUSPENSION ORAL at 21:12

## 2018-01-01 RX ADMIN — MEGESTROL ACETATE 400 MG: 40 SUSPENSION ORAL at 20:16

## 2018-01-01 RX ADMIN — MEGESTROL ACETATE 400 MG: 40 SUSPENSION ORAL at 08:38

## 2018-01-01 RX ADMIN — METOPROLOL TARTRATE 12.5 MG: 25 TABLET ORAL at 21:58

## 2018-01-01 RX ADMIN — METOPROLOL TARTRATE 12.5 MG: 25 TABLET ORAL at 09:54

## 2018-01-01 RX ADMIN — MAGNESIUM OXIDE TAB 400 MG (241.3 MG ELEMENTAL MG) 400 MG: 400 (241.3 MG) TAB at 21:00

## 2018-01-01 RX ADMIN — MAGNESIUM OXIDE TAB 400 MG (241.3 MG ELEMENTAL MG) 400 MG: 400 (241.3 MG) TAB at 18:55

## 2018-01-01 RX ADMIN — SODIUM CHLORIDE 100 ML/HR: 9 INJECTION, SOLUTION INTRAVENOUS at 07:02

## 2018-01-01 RX ADMIN — FAMOTIDINE 20 MG: 20 TABLET ORAL at 08:37

## 2018-01-01 RX ADMIN — Medication 1 CAPSULE: at 20:40

## 2018-01-01 RX ADMIN — Medication 1 CAPSULE: at 21:00

## 2018-01-01 RX ADMIN — ACYCLOVIR 400 MG: 200 SUSPENSION ORAL at 19:58

## 2018-01-01 RX ADMIN — SODIUM CHLORIDE TAB 1 GM 1 G: 1 TAB at 18:27

## 2018-01-01 RX ADMIN — MUPIROCIN: 2 CREAM TOPICAL at 21:58

## 2018-01-01 RX ADMIN — FUROSEMIDE 20 MG: 10 INJECTION, SOLUTION INTRAMUSCULAR; INTRAVENOUS at 11:10

## 2018-01-01 RX ADMIN — MULTIPLE VITAMINS W/ MINERALS TAB 1 TABLET: TAB at 08:37

## 2018-01-01 RX ADMIN — Medication 1 CAPSULE: at 18:16

## 2018-01-01 RX ADMIN — FUROSEMIDE 20 MG: 10 INJECTION, SOLUTION INTRAMUSCULAR; INTRAVENOUS at 21:58

## 2018-01-01 RX ADMIN — FAMOTIDINE 20 MG: 20 TABLET ORAL at 09:54

## 2018-01-01 RX ADMIN — HYDROCODONE BITARTRATE AND ACETAMINOPHEN 1 TABLET: 5; 325 TABLET ORAL at 07:25

## 2018-01-01 RX ADMIN — Medication 1 CAPSULE: at 20:16

## 2018-01-01 RX ADMIN — HYDRALAZINE HYDROCHLORIDE 20 MG: 20 INJECTION INTRAMUSCULAR; INTRAVENOUS at 19:31

## 2018-01-01 RX ADMIN — POTASSIUM CHLORIDE 10 MEQ: 7.46 INJECTION, SOLUTION INTRAVENOUS at 11:42

## 2018-01-01 RX ADMIN — Medication 10 ML: at 21:58

## 2018-01-01 RX ADMIN — Medication 1 CAPSULE: at 10:42

## 2018-01-01 RX ADMIN — SODIUM CHLORIDE 100 ML/HR: 9 INJECTION, SOLUTION INTRAVENOUS at 04:10

## 2018-01-01 RX ADMIN — MEGESTROL ACETATE 400 MG: 40 SUSPENSION ORAL at 09:54

## 2018-01-01 RX ADMIN — MUPIROCIN: 2 CREAM TOPICAL at 20:40

## 2018-01-01 RX ADMIN — POTASSIUM CHLORIDE 40 MEQ: 750 CAPSULE, EXTENDED RELEASE ORAL at 18:48

## 2018-01-01 RX ADMIN — MEGESTROL ACETATE 400 MG: 40 SUSPENSION ORAL at 09:49

## 2018-01-01 RX ADMIN — Medication 1 CAPSULE: at 15:40

## 2018-01-01 RX ADMIN — MEGESTROL ACETATE 400 MG: 40 SUSPENSION ORAL at 21:00

## 2018-01-01 RX ADMIN — FUROSEMIDE 20 MG: 10 INJECTION, SOLUTION INTRAMUSCULAR; INTRAVENOUS at 14:45

## 2018-01-01 RX ADMIN — POTASSIUM CHLORIDE 20 MEQ: 1.5 POWDER, FOR SOLUTION ORAL at 09:31

## 2018-01-01 RX ADMIN — MAGNESIUM SULFATE IN WATER 2 G: 40 INJECTION, SOLUTION INTRAVENOUS at 10:37

## 2018-01-01 RX ADMIN — ACYCLOVIR 400 MG: 200 SUSPENSION ORAL at 20:40

## 2018-01-01 RX ADMIN — MAGNESIUM OXIDE TAB 400 MG (241.3 MG ELEMENTAL MG) 400 MG: 400 (241.3 MG) TAB at 08:44

## 2018-01-01 RX ADMIN — MUPIROCIN: 2 CREAM TOPICAL at 10:42

## 2018-01-01 RX ADMIN — BISACODYL 10 MG: 10 SUPPOSITORY RECTAL at 10:43

## 2018-01-01 RX ADMIN — MUPIROCIN: 2 CREAM TOPICAL at 00:03

## 2018-01-01 RX ADMIN — PAROXETINE HYDROCHLORIDE 20 MG: 20 TABLET, FILM COATED ORAL at 15:40

## 2018-01-01 RX ADMIN — LOSARTAN POTASSIUM 25 MG: 25 TABLET, FILM COATED ORAL at 09:50

## 2018-01-01 RX ADMIN — SODIUM CHLORIDE 25 ML/HR: 3 INJECTION, SOLUTION INTRAVENOUS at 16:30

## 2018-01-01 RX ADMIN — DOCUSATE SODIUM 100 MG: 100 CAPSULE, LIQUID FILLED ORAL at 08:37

## 2018-01-01 RX ADMIN — METOPROLOL TARTRATE 12.5 MG: 25 TABLET ORAL at 09:50

## 2018-01-01 RX ADMIN — LEVOTHYROXINE SODIUM 112 MCG: 112 TABLET ORAL at 06:55

## 2018-01-01 RX ADMIN — PANTOPRAZOLE SODIUM 40 MG: 40 TABLET, DELAYED RELEASE ORAL at 06:55

## 2018-01-01 RX ADMIN — CEFUROXIME AXETIL 500 MG: 250 TABLET ORAL at 21:13

## 2018-01-01 RX ADMIN — SODIUM CHLORIDE TAB 1 GM 1 G: 1 TAB at 09:54

## 2018-01-01 RX ADMIN — LOSARTAN POTASSIUM 25 MG: 25 TABLET, FILM COATED ORAL at 10:41

## 2018-01-01 RX ADMIN — POTASSIUM CHLORIDE 20 MEQ: 1.5 POWDER, FOR SOLUTION ORAL at 10:40

## 2018-01-01 RX ADMIN — MAGNESIUM OXIDE TAB 400 MG (241.3 MG ELEMENTAL MG) 400 MG: 400 (241.3 MG) TAB at 09:54

## 2018-01-01 RX ADMIN — LEVOTHYROXINE SODIUM 112 MCG: 112 TABLET ORAL at 05:23

## 2018-01-01 RX ADMIN — MAGNESIUM OXIDE TAB 400 MG (241.3 MG ELEMENTAL MG) 400 MG: 400 (241.3 MG) TAB at 09:32

## 2018-01-01 RX ADMIN — FAMOTIDINE 20 MG: 20 TABLET ORAL at 08:49

## 2018-01-01 RX ADMIN — MUPIROCIN: 2 CREAM TOPICAL at 21:00

## 2018-01-01 RX ADMIN — METOPROLOL TARTRATE 12.5 MG: 25 TABLET ORAL at 08:49

## 2018-01-01 RX ADMIN — SODIUM CHLORIDE TAB 1 GM 1 G: 1 TAB at 09:32

## 2018-01-01 RX ADMIN — ACYCLOVIR 400 MG: 200 SUSPENSION ORAL at 20:17

## 2018-01-01 RX ADMIN — LOSARTAN POTASSIUM 25 MG: 25 TABLET, FILM COATED ORAL at 09:32

## 2018-01-01 RX ADMIN — HYDROCODONE BITARTRATE AND ACETAMINOPHEN 1 TABLET: 5; 325 TABLET ORAL at 09:32

## 2018-01-01 RX ADMIN — MEGESTROL ACETATE 400 MG: 40 SUSPENSION ORAL at 20:40

## 2018-01-01 RX ADMIN — MUPIROCIN: 2 CREAM TOPICAL at 09:50

## 2018-01-01 RX ADMIN — PHYTONADIONE 2.5 MG: 10 INJECTION, EMULSION INTRAMUSCULAR; INTRAVENOUS; SUBCUTANEOUS at 21:39

## 2018-01-01 RX ADMIN — FAMOTIDINE 20 MG: 20 TABLET ORAL at 09:42

## 2018-01-01 RX ADMIN — LEVOTHYROXINE SODIUM 112 MCG: 112 TABLET ORAL at 05:38

## 2018-01-01 RX ADMIN — PANTOPRAZOLE SODIUM 40 MG: 40 TABLET, DELAYED RELEASE ORAL at 05:38

## 2018-01-01 RX ADMIN — SODIUM CHLORIDE 500 ML: 9 INJECTION, SOLUTION INTRAVENOUS at 19:08

## 2018-01-01 RX ADMIN — CEFDINIR 300 MG: 300 CAPSULE ORAL at 20:17

## 2018-01-01 RX ADMIN — ACYCLOVIR 400 MG: 200 SUSPENSION ORAL at 21:59

## 2018-01-01 RX ADMIN — PANTOPRAZOLE SODIUM 40 MG: 40 TABLET, DELAYED RELEASE ORAL at 05:27

## 2018-01-01 RX ADMIN — MULTIPLE VITAMINS W/ MINERALS TAB 1 TABLET: TAB at 09:32

## 2018-01-01 RX ADMIN — HYDROCODONE BITARTRATE AND ACETAMINOPHEN 1 TABLET: 5; 325 TABLET ORAL at 21:59

## 2018-01-01 RX ADMIN — HYDROCODONE BITARTRATE AND ACETAMINOPHEN 1 TABLET: 5; 325 TABLET ORAL at 09:26

## 2018-01-01 RX ADMIN — SODIUM CHLORIDE TAB 1 GM 1 G: 1 TAB at 11:10

## 2018-01-01 RX ADMIN — MUPIROCIN: 2 CREAM TOPICAL at 11:07

## 2018-01-01 RX ADMIN — MAGNESIUM OXIDE TAB 400 MG (241.3 MG ELEMENTAL MG) 400 MG: 400 (241.3 MG) TAB at 16:04

## 2018-01-01 RX ADMIN — ACYCLOVIR 400 MG: 200 SUSPENSION ORAL at 10:43

## 2018-01-01 RX ADMIN — Medication 1 CAPSULE: at 16:04

## 2018-01-01 RX ADMIN — MEGESTROL ACETATE 400 MG: 40 SUSPENSION ORAL at 21:28

## 2018-01-01 RX ADMIN — HYDROCODONE BITARTRATE AND ACETAMINOPHEN 1 TABLET: 5; 325 TABLET ORAL at 18:16

## 2018-01-01 RX ADMIN — SODIUM CHLORIDE 100 ML/HR: 9 INJECTION, SOLUTION INTRAVENOUS at 18:16

## 2018-01-01 RX ADMIN — LOSARTAN POTASSIUM 25 MG: 25 TABLET, FILM COATED ORAL at 09:53

## 2018-01-01 RX ADMIN — Medication 1 CAPSULE: at 18:55

## 2018-01-01 RX ADMIN — HYDROCODONE BITARTRATE AND ACETAMINOPHEN 1 TABLET: 5; 325 TABLET ORAL at 21:36

## 2018-01-01 RX ADMIN — CEFDINIR 300 MG: 300 CAPSULE ORAL at 08:39

## 2018-01-01 RX ADMIN — CEFUROXIME AXETIL 500 MG: 250 TABLET ORAL at 15:39

## 2018-01-01 RX ADMIN — ACYCLOVIR 400 MG: 200 SUSPENSION ORAL at 18:55

## 2018-01-01 RX ADMIN — SODIUM CHLORIDE 500 ML: 9 INJECTION, SOLUTION INTRAVENOUS at 21:01

## 2018-01-01 RX ADMIN — ACYCLOVIR 400 MG: 200 SUSPENSION ORAL at 16:04

## 2018-01-01 RX ADMIN — CEFUROXIME AXETIL 500 MG: 250 TABLET ORAL at 21:00

## 2018-01-01 RX ADMIN — MAGNESIUM OXIDE TAB 400 MG (241.3 MG ELEMENTAL MG) 400 MG: 400 (241.3 MG) TAB at 20:16

## 2018-01-01 RX ADMIN — HYDROCORTISONE: 10 CREAM TOPICAL at 14:22

## 2018-01-01 RX ADMIN — SODIUM CHLORIDE 75 ML/HR: 9 INJECTION, SOLUTION INTRAVENOUS at 03:18

## 2018-01-01 RX ADMIN — HYDROCODONE BITARTRATE AND ACETAMINOPHEN 1 TABLET: 5; 325 TABLET ORAL at 20:40

## 2018-01-01 RX ADMIN — POTASSIUM CHLORIDE 40 MEQ: 1.5 POWDER, FOR SOLUTION ORAL at 18:30

## 2018-01-01 RX ADMIN — POTASSIUM CHLORIDE 20 MEQ: 1.5 POWDER, FOR SOLUTION ORAL at 08:49

## 2018-01-01 RX ADMIN — SODIUM CHLORIDE TAB 1 GM 1 G: 1 TAB at 14:22

## 2018-01-01 RX ADMIN — ACYCLOVIR 400 MG: 200 SUSPENSION ORAL at 08:38

## 2018-01-01 RX ADMIN — SODIUM CHLORIDE 1000 ML: 9 INJECTION, SOLUTION INTRAVENOUS at 16:54

## 2018-01-01 RX ADMIN — SODIUM CHLORIDE 1000 ML: 9 INJECTION, SOLUTION INTRAVENOUS at 08:34

## 2018-01-01 RX ADMIN — DOCUSATE SODIUM 100 MG: 100 CAPSULE, LIQUID FILLED ORAL at 09:54

## 2018-01-01 RX ADMIN — MULTIPLE VITAMINS W/ MINERALS TAB 1 TABLET: TAB at 09:54

## 2018-01-01 RX ADMIN — ONDANSETRON 4 MG: 2 INJECTION INTRAMUSCULAR; INTRAVENOUS at 16:55

## 2018-01-01 RX ADMIN — Medication 1 CAPSULE: at 09:50

## 2018-01-01 RX ADMIN — HYDROCORTISONE: 10 CREAM TOPICAL at 05:27

## 2018-01-01 RX ADMIN — MAGNESIUM OXIDE TAB 400 MG (241.3 MG ELEMENTAL MG) 400 MG: 400 (241.3 MG) TAB at 10:40

## 2018-01-01 RX ADMIN — HYDROCODONE BITARTRATE AND ACETAMINOPHEN 1 TABLET: 5; 325 TABLET ORAL at 09:42

## 2018-01-01 RX ADMIN — HYDROCODONE BITARTRATE AND ACETAMINOPHEN 1 TABLET: 5; 325 TABLET ORAL at 17:19

## 2018-01-01 RX ADMIN — POTASSIUM CHLORIDE 40 MEQ: 1.5 POWDER, FOR SOLUTION ORAL at 10:37

## 2018-01-01 RX ADMIN — MAGNESIUM OXIDE TAB 400 MG (241.3 MG ELEMENTAL MG) 400 MG: 400 (241.3 MG) TAB at 09:50

## 2018-01-01 RX ADMIN — MAGNESIUM OXIDE TAB 400 MG (241.3 MG ELEMENTAL MG) 400 MG: 400 (241.3 MG) TAB at 19:00

## 2018-01-01 RX ADMIN — MAGNESIUM OXIDE TAB 400 MG (241.3 MG ELEMENTAL MG) 400 MG: 400 (241.3 MG) TAB at 08:37

## 2018-01-01 RX ADMIN — METOPROLOL TARTRATE 12.5 MG: 25 TABLET ORAL at 20:17

## 2018-01-01 RX ADMIN — PAROXETINE HYDROCHLORIDE 20 MG: 20 TABLET, FILM COATED ORAL at 09:32

## 2018-01-01 RX ADMIN — MEGESTROL ACETATE 400 MG: 40 SUSPENSION ORAL at 20:56

## 2018-01-01 RX ADMIN — POTASSIUM CHLORIDE AND SODIUM CHLORIDE 100 ML/HR: 900; 150 INJECTION, SOLUTION INTRAVENOUS at 10:02

## 2018-01-01 RX ADMIN — POTASSIUM CHLORIDE 20 MEQ: 1.5 POWDER, FOR SOLUTION ORAL at 09:50

## 2018-01-01 RX ADMIN — ACYCLOVIR 400 MG: 200 SUSPENSION ORAL at 09:54

## 2018-01-01 RX ADMIN — Medication 1 CAPSULE: at 09:32

## 2018-01-01 RX ADMIN — CEFDINIR 300 MG: 300 CAPSULE ORAL at 10:43

## 2018-01-01 RX ADMIN — MEGESTROL ACETATE 400 MG: 40 SUSPENSION ORAL at 09:32

## 2018-01-01 RX ADMIN — SODIUM CHLORIDE TAB 1 GM 1 G: 1 TAB at 18:16

## 2018-01-01 RX ADMIN — MEGESTROL ACETATE 400 MG: 40 SUSPENSION ORAL at 08:39

## 2018-01-01 RX ADMIN — CEFTRIAXONE 1 G: 1 INJECTION, POWDER, FOR SOLUTION INTRAMUSCULAR; INTRAVENOUS at 20:55

## 2018-01-01 RX ADMIN — FUROSEMIDE 20 MG: 20 TABLET ORAL at 12:13

## 2018-01-01 RX ADMIN — MULTIPLE VITAMINS W/ MINERALS TAB 1 TABLET: TAB at 09:50

## 2018-01-01 RX ADMIN — LOSARTAN POTASSIUM 25 MG: 25 TABLET, FILM COATED ORAL at 09:43

## 2018-01-01 RX ADMIN — CEFTRIAXONE 1 G: 1 INJECTION, POWDER, FOR SOLUTION INTRAMUSCULAR; INTRAVENOUS at 21:28

## 2018-01-01 RX ADMIN — FAMOTIDINE 20 MG: 20 TABLET ORAL at 09:21

## 2018-01-01 RX ADMIN — Medication 1 CAPSULE: at 18:59

## 2018-01-01 RX ADMIN — MEGESTROL ACETATE 400 MG: 40 SUSPENSION ORAL at 21:59

## 2018-01-01 RX ADMIN — SODIUM CHLORIDE TAB 1 GM 1 G: 1 TAB at 19:35

## 2018-01-01 RX ADMIN — CEFUROXIME AXETIL 500 MG: 250 TABLET ORAL at 09:32

## 2018-01-01 RX ADMIN — MUPIROCIN: 2 CREAM TOPICAL at 09:54

## 2018-01-01 RX ADMIN — METOPROLOL TARTRATE 12.5 MG: 25 TABLET ORAL at 20:40

## 2018-01-01 RX ADMIN — LEVOTHYROXINE SODIUM 112 MCG: 112 TABLET ORAL at 07:00

## 2018-01-01 RX ADMIN — MUPIROCIN: 2 CREAM TOPICAL at 10:00

## 2018-01-01 RX ADMIN — MULTIPLE VITAMINS W/ MINERALS TAB 1 TABLET: TAB at 10:40

## 2018-01-01 RX ADMIN — PAROXETINE HYDROCHLORIDE 20 MG: 20 TABLET, FILM COATED ORAL at 10:41

## 2018-01-01 RX ADMIN — MEGESTROL ACETATE 400 MG: 40 SUSPENSION ORAL at 09:43

## 2018-01-01 RX ADMIN — FUROSEMIDE 20 MG: 20 TABLET ORAL at 08:47

## 2018-01-01 RX ADMIN — HYDROCORTISONE: 10 CREAM TOPICAL at 18:27

## 2018-01-01 RX ADMIN — MULTIPLE VITAMINS W/ MINERALS TAB 1 TABLET: TAB at 08:48

## 2018-01-01 RX ADMIN — METOPROLOL TARTRATE 12.5 MG: 25 TABLET ORAL at 10:41

## 2018-01-01 RX ADMIN — MULTIPLE VITAMINS W/ MINERALS TAB 1 TABLET: TAB at 09:42

## 2018-01-01 RX ADMIN — Medication 1 CAPSULE: at 21:13

## 2018-01-01 RX ADMIN — ACYCLOVIR 400 MG: 200 SUSPENSION ORAL at 13:41

## 2018-01-01 RX ADMIN — MEGESTROL ACETATE 400 MG: 40 SUSPENSION ORAL at 10:41

## 2018-01-01 RX ADMIN — FAMOTIDINE 20 MG: 20 TABLET ORAL at 10:44

## 2018-01-01 RX ADMIN — PANTOPRAZOLE SODIUM 40 MG: 40 TABLET, DELAYED RELEASE ORAL at 07:00

## 2018-01-01 RX ADMIN — Medication 1 CAPSULE: at 21:58

## 2018-01-01 RX ADMIN — FAMOTIDINE 20 MG: 20 TABLET ORAL at 09:32

## 2018-01-01 RX ADMIN — PAROXETINE HYDROCHLORIDE 20 MG: 20 TABLET, FILM COATED ORAL at 09:54

## 2018-01-01 RX ADMIN — ACYCLOVIR 400 MG: 200 SUSPENSION ORAL at 18:59

## 2018-01-01 RX ADMIN — MAGNESIUM OXIDE TAB 400 MG (241.3 MG ELEMENTAL MG) 400 MG: 400 (241.3 MG) TAB at 21:58

## 2018-01-01 RX ADMIN — MAGNESIUM OXIDE TAB 400 MG (241.3 MG ELEMENTAL MG) 400 MG: 400 (241.3 MG) TAB at 20:40

## 2018-01-01 RX ADMIN — LOSARTAN POTASSIUM 25 MG: 25 TABLET, FILM COATED ORAL at 08:40

## 2018-01-05 DIAGNOSIS — K21.00 GASTROESOPHAGEAL REFLUX DISEASE WITH ESOPHAGITIS: ICD-10-CM

## 2018-01-05 DIAGNOSIS — R13.11 ORAL PHASE DYSPHAGIA: ICD-10-CM

## 2018-01-05 RX ORDER — PANTOPRAZOLE SODIUM 40 MG/1
TABLET, DELAYED RELEASE ORAL
Qty: 90 TABLET | Refills: 1 | Status: SHIPPED | OUTPATIENT
Start: 2018-01-05 | End: 2018-01-01 | Stop reason: SDUPTHER

## 2018-01-19 DIAGNOSIS — E03.8 OTHER SPECIFIED HYPOTHYROIDISM: ICD-10-CM

## 2018-01-19 RX ORDER — LEVOTHYROXINE SODIUM 112 UG/1
TABLET ORAL
Qty: 90 TABLET | Refills: 1 | Status: SHIPPED | OUTPATIENT
Start: 2018-01-19 | End: 2018-01-01 | Stop reason: SDUPTHER

## 2018-01-30 DIAGNOSIS — M15.9 PRIMARY OSTEOARTHRITIS INVOLVING MULTIPLE JOINTS: ICD-10-CM

## 2018-01-30 DIAGNOSIS — M54.9 CHRONIC BACK PAIN, UNSPECIFIED BACK LOCATION, UNSPECIFIED BACK PAIN LATERALITY: ICD-10-CM

## 2018-01-30 DIAGNOSIS — G89.29 CHRONIC BACK PAIN, UNSPECIFIED BACK LOCATION, UNSPECIFIED BACK PAIN LATERALITY: ICD-10-CM

## 2018-01-30 RX ORDER — HYDROCODONE BITARTRATE AND ACETAMINOPHEN 5; 325 MG/1; MG/1
1 TABLET ORAL EVERY 8 HOURS PRN
Qty: 90 TABLET | Refills: 0 | Status: SHIPPED | OUTPATIENT
Start: 2018-01-30 | End: 2018-01-01 | Stop reason: SDUPTHER

## 2018-02-19 PROBLEM — R13.11 ORAL PHASE DYSPHAGIA: Status: ACTIVE | Noted: 2018-01-01
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
## 2018-05-10 NOTE — PROGRESS NOTES
QUICK REFERENCE INFORMATION:  The ABCs of the Annual Wellness Visit    Subsequent Medicare Wellness Visit    Subjective   History of Present Illness    Deidre Bobby is a 89 y.o. female who presents for an Subsequent Wellness Visit. In addition, we addressed the following health issues:    Swallowing and hoarseness: still feeling something in her throat when she swallows and talks.        HEALTH RISK ASSESSMENT    2/14/1925    Recent Hospitalizations:  No hospitalization(s) within the last year..        Current Medical Providers:  Patient Care Team:  Nancy Morrison MD as PCP - General  Nancy Morrison MD as PCP - Family Medicine        Smoking Status:  History   Smoking Status   • Never Smoker   Smokeless Tobacco   • Never Used       Alcohol Consumption:  History   Alcohol Use No       Depression Screen:   PHQ-2/PHQ-9 Depression Screening 5/10/2018   Little interest or pleasure in doing things 0   Feeling down, depressed, or hopeless 0   Trouble falling or staying asleep, or sleeping too much -   Feeling tired or having little energy -   Poor appetite or overeating -   Feeling bad about yourself - or that you are a failure or have let yourself or your family down -   Trouble concentrating on things, such as reading the newspaper or watching television -   Moving or speaking so slowly that other people could have noticed. Or the opposite - being so fidgety or restless that you have been moving around a lot more than usual -   Thoughts that you would be better off dead, or of hurting yourself in some way -   Total Score 0   If you checked off any problems, how difficult have these problems made it for you to do your work, take care of things at home, or get along with other people? -       Health Habits and Functional and Cognitive Screening:  Functional & Cognitive Status 5/10/2018   Do you have difficulty preparing food and eating? No   Do you have difficulty bathing yourself, getting dressed or grooming  yourself? Yes   Do you have difficulty using the toilet? Yes   Do you have difficulty moving around from place to place? Yes   Do you have trouble with steps or getting out of a bed or a chair? Yes   In the past year have you fallen or experienced a near fall? Yes   Current Diet Well Balanced Diet   Dental Exam dentures   Eye Exam Not up to date   Exercise (times per week) 3 times per week   Current Exercise Activities Include Walks with walker in hallway   Do you need help using the phone?  Yes   Are you deaf or do you have serious difficulty hearing?  Yes   Do you need help with transportation? Yes   Do you need help shopping? Yes   Do you need help preparing meals?  Yes   Do you need help with housework?  Yes   Do you need help with laundry? Yes   Do you need help taking your medications? Yes   Do you need help managing money? No   Do you ever drive or ride in a car without wearing a seat belt? No   Have you felt unusual stress, anger or loneliness in the last month? No   Who do you live with? Other   If you need help, do you have trouble finding someone available to you? No           Does the patient have evidence of cognitive impairment? Yes, only mild    Aspirin use counseling: Taking ASA appropriately as indicated      Recent Lab Results:  CMP:  Lab Results   Component Value Date    GLU 88 05/10/2018    BUN 21 (H) 05/10/2018    CREATININE 1.10 05/10/2018    EGFRIFNONA 46 (L) 05/10/2018    EGFRIFAFRI 56 (L) 05/10/2018    BCR 19.1 05/10/2018     (L) 05/10/2018    K 4.3 05/10/2018    CO2 27.0 05/10/2018    CALCIUM 9.5 05/10/2018    PROTENTOTREF 6.6 05/10/2018    ALBUMIN 4.00 05/10/2018    LABGLOBREF 2.6 05/10/2018    LABIL2 1.5 05/10/2018    BILITOT 0.2 05/10/2018    ALKPHOS 103 05/10/2018    AST 26 05/10/2018    ALT 24 05/10/2018     Lipid Panel:     HbA1c:       Visual Acuity:  No exam data present    Age-appropriate Screening Schedule:  Refer to the list below for future screening recommendations based  on patient's age, sex and/or medical conditions. Orders for these recommended tests are listed in the plan section. The patient has been provided with a written plan.    Health Maintenance   Topic Date Due   • TDAP/TD VACCINES (1 - Tdap) 05/01/2019 (Originally 2/14/1944)   • INFLUENZA VACCINE  08/01/2018   • PNEUMOCOCCAL VACCINES (65+ LOW/MEDIUM RISK)  Completed   • MAMMOGRAM  Excluded   • LIPID PANEL  Excluded   • ZOSTER VACCINE  Excluded        Subjective   History of Present Illness    Deidre Bobby is a 89 y.o. female who presents for an Subsequent Wellness Visit.    The following portions of the patient's history were reviewed and updated as appropriate: She  has a past medical history of Arthritis; Balance disorder; Congestive heart failure; GERD (gastroesophageal reflux disease); Hyperlipidemia; Macular degeneration; Myocardial infarction (2009); and Vitamin B12 deficiency.  She has Chronic back pain on her pertinent problem list.  She  has a past surgical history that includes Cholecystectomy; Hemorrhoid surgery; Hysterectomy; Laparoscopy repair hiatal hernia (2008); Posterior Lumbar/Thoracic Spine Fusion; Skin cancer excision; Finger/Thumb Arthroplasty (Right); and Cataract extraction.  Her family history is not on file.  She  reports that she has never smoked. She has never used smokeless tobacco. She reports that she does not drink alcohol or use drugs..    Outpatient Medications Prior to Visit   Medication Sig Dispense Refill   • aspirin 81 MG tablet Take  by mouth daily.     • Cyanocobalamin ER (RA VITAMIN B-12) 1000 MCG tablet controlled-release Take  by mouth daily.     • erythromycin (ROMYCIN) 5 MG/GM ophthalmic ointment      • HYDROcodone-acetaminophen (LORTAB) 5-325 MG per tablet Take 1 tablet by mouth Every 8 (Eight) Hours As Needed (arthritis pain). 90 tablet 0   • hydrocortisone (ANUSOL-HC) 2.5 % rectal cream Insert  into the rectum 2 (Two) Times a Day. 28.35 g 2   • levothyroxine (SYNTHROID,  LEVOTHROID) 112 MCG tablet TAKE ONE TABLET BY MOUTH EVERY DAY 90 tablet 1   • MULTIPLE VITAMINS PO Take  by mouth daily.     • PARoxetine (PAXIL) 20 MG tablet TAKE ONE TABLET BY MOUTH EVERY DAY 30 tablet 5   • Probiotic Product (PROBIOTIC DAILY PO) Take  by mouth.     • raNITIdine (ZANTAC) 150 MG tablet Take 150 mg by mouth 2 (Two) Times a Day As Needed.     • carvedilol (COREG) 3.125 MG tablet TAKE ONE TABLET TWICE A DAY WITH MEALS 60 tablet 5   • pantoprazole (PROTONIX) 40 MG EC tablet TAKE ONE TABLET BY MOUTH EVERY DAY 90 tablet 1   • polyethylene glycol (MIRALAX) packet Take 17 g by mouth Daily. (Patient taking differently: Take 17 g by mouth Every Other Day.) 30 packet 5   • carvedilol (COREG) 3.125 MG tablet TAKE ONE TABLET TWICE A DAY WITH MEALS 60 tablet 5     No facility-administered medications prior to visit.        Patient Active Problem List   Diagnosis   • Primary osteoarthritis involving multiple joints   • Impairment of balance   • Chronic diastolic congestive heart failure   • Cardiac disease   • Hypercholesterolemia   • History of myocardial infarction   • Macular degeneration   • Hypothyroidism due to acquired atrophy of thyroid   • Cobalamin deficiency   • Chronic back pain   • Chronic constipation with overflow   • Oral phase dysphagia       Advance Care Planning:  has an advance directive - a copy has been provided and is in file    Identification of Risk Factors:  Risk factors include: increased fall risk, vision limitations, hearing limitations and polypharmacy.    Review of Systems   HENT: Negative.    Eyes: Positive for discharge and itching.   Respiratory: Positive for cough and choking.    Cardiovascular: Negative.    Gastrointestinal: Negative.    Endocrine: Negative.    Genitourinary: Negative.    Musculoskeletal: Positive for arthralgias and gait problem.   Skin: Negative.    Allergic/Immunologic: Negative.    Neurological: Positive for weakness and light-headedness.   Hematological:  "Negative.    Psychiatric/Behavioral: Negative.        Compared to one year ago, the patient feels her physical health is worse.  Compared to one year ago, the patient feels her mental health is the same.    Objective     Physical Exam   Constitutional: She is oriented to person, place, and time. She appears well-developed and well-nourished. No distress.   Cardiovascular: Normal rate and regular rhythm.    Pulmonary/Chest: Effort normal and breath sounds normal.   Neurological: She is alert and oriented to person, place, and time.   Psychiatric: She has a normal mood and affect. Her behavior is normal.       Vitals:    05/10/18 1456   BP: 94/60   Pulse: 62   Temp: 98.3 °F (36.8 °C)   SpO2: 96%   Weight: 50.5 kg (111 lb 4 oz)   Height: 166.4 cm (65.5\")       Body mass index is 18.23 kg/m².  Discussed the patient's BMI with her. BMI is below normal parameters. Recommendations include: treating the underlying disease process.    Assessment/Plan   Patient Self-Management and Personalized Health Advice  The patient has been provided with information about: exercise and fall prevention and preventive services including:   · Fall Risk assessment done, Fall Risk plan of care done, Nutrition counseling provided, Pneumococcal vaccine .    Visit Diagnoses:    ICD-10-CM ICD-9-CM   1. General physical deterioration R53.81 780.79   2. Chronic hoarseness R49.0 784.42   3. Pharyngeal dysphagia R13.13 787.23   4. Chronic constipation with overflow K59.09 564.00   5. Need for pneumococcal vaccination Z23 V03.82   6. Weakness of both lower extremities R29.898 729.89   7. Gastroesophageal reflux disease with esophagitis K21.0 530.11   8. Oral phase dysphagia R13.11 787.21   9. Hypothyroidism due to acquired atrophy of thyroid E03.4 244.8     246.8       Orders Placed This Encounter   Procedures   • Pneumococcal Polysaccharide Vaccine 23-Valent Greater Than or Equal To 3yo Subcutaneous / IM     Standing Status:   Future     Number of " Occurrences:   1   • Comprehensive Metabolic Panel     Order Specific Question:   [facility] Has the patient fasted?     Answer:   No   • T4     Order Specific Question:   [facility] Has the patient fasted?     Answer:   No   • TSH     Order Specific Question:   [facility] Has the patient fasted?     Answer:   No   • CBC (No Diff)     Order Specific Question:   [facility] Has the patient fasted?     Answer:   No   • Vitamin B12     Order Specific Question:   [facility] Has the patient fasted?     Answer:   No   • Ambulatory Referral to ENT (Otolaryngology)     Referral Priority:   Routine     Referral Type:   Consultation     Referral Reason:   Specialty Services Required     Referred to Provider:   Virgil Bach MD     Requested Specialty:   Otolaryngology     Number of Visits Requested:   1   • Ambulatory Referral to Home Health     Referral Priority:   Routine     Referral Type:   Home Health     Referral Reason:   Specialty Services Required     Requested Specialty:   Home Health Services     Number of Visits Requested:   1       Outpatient Encounter Prescriptions as of 5/10/2018   Medication Sig Dispense Refill   • aspirin 81 MG tablet Take  by mouth daily.     • Cyanocobalamin ER (RA VITAMIN B-12) 1000 MCG tablet controlled-release Take  by mouth daily.     • erythromycin (ROMYCIN) 5 MG/GM ophthalmic ointment      • HYDROcodone-acetaminophen (LORTAB) 5-325 MG per tablet Take 1 tablet by mouth Every 8 (Eight) Hours As Needed (arthritis pain). 90 tablet 0   • hydrocortisone (ANUSOL-HC) 2.5 % rectal cream Insert  into the rectum 2 (Two) Times a Day. 28.35 g 2   • levothyroxine (SYNTHROID, LEVOTHROID) 112 MCG tablet TAKE ONE TABLET BY MOUTH EVERY DAY 90 tablet 1   • MULTIPLE VITAMINS PO Take  by mouth daily.     • pantoprazole (PROTONIX) 40 MG EC tablet Take 1 tablet by mouth Daily. 90 tablet 1   • PARoxetine (PAXIL) 20 MG tablet TAKE ONE TABLET BY MOUTH EVERY DAY 30 tablet 5   • polyethylene glycol (MIRALAX) packet Take 17  g by mouth Daily As Needed (constipation).     • Probiotic Product (PROBIOTIC DAILY PO) Take  by mouth.     • raNITIdine (ZANTAC) 150 MG tablet Take 150 mg by mouth 2 (Two) Times a Day As Needed.     • [DISCONTINUED] carvedilol (COREG) 3.125 MG tablet TAKE ONE TABLET TWICE A DAY WITH MEALS 60 tablet 5   • [DISCONTINUED] pantoprazole (PROTONIX) 40 MG EC tablet TAKE ONE TABLET BY MOUTH EVERY DAY 90 tablet 1   • [DISCONTINUED] polyethylene glycol (MIRALAX) packet Take 17 g by mouth Daily. (Patient taking differently: Take 17 g by mouth Every Other Day.) 30 packet 5   • [DISCONTINUED] carvedilol (COREG) 3.125 MG tablet TAKE ONE TABLET TWICE A DAY WITH MEALS 60 tablet 5   • [DISCONTINUED] polyethylene glycol (MIRALAX) powder        No facility-administered encounter medications on file as of 5/10/2018.        Reviewed use of high risk medication in the elderly: yes  Reviewed for potential of harmful drug interactions in the elderly: yes    Follow Up:  Return in about 2 months (around 7/10/2018).     An After Visit Summary and PPPS with all of these plans were given to the patient.

## 2018-06-04 NOTE — TELEPHONE ENCOUNTER
Patient's daughter came in and advised patient is completely out of her pain medication. She was under the impression that since it was electronically sent last time that it would just keep renewing. I advised that she would still need to call and request her medication.

## 2018-06-05 NOTE — TELEPHONE ENCOUNTER
Patients granddaughter Gaviota Wilson called the office aggressively. She stated that she is on her way from [place] and she is not going to be nice about getting Lillies pain Rx. She stated that she will sit in the office all day until it is presented into her hands. She stated that she never wants it sent electronically because our system does not work sufficiently. I apologized to the patients granddaughters and went directly after hanging up to try ad resolve the issue.

## 2018-06-05 NOTE — TELEPHONE ENCOUNTER
"Called patient's granddaughter back and advised her that the patient's medication had been sent electronically by Dr. Morrison, last evening at 6:01p.m. She became very angry and advised that we were unprofessional and that she never wants her grandmother's pain medication sent electronically again, she wants a paper rx to be placed in her hands. I tried to calm the patient down, with no luck. She was also advised that Dr. Morrison was in clinic all day yesterday and as soon as she finished her last patient she electronically sent in the medication for the patient.  She then started yelling at me, telling me that \"this shit needs to be fixed\", and \"she is tired of all this shit\". I then told her that I would be ending our conversation due to the yelling and profanity. She continued to yell, saying that the pharmacist at the [facility] had to stay late last night to wait on her grandmother's medication to be sent. I again tried to reason with Ms. Marshall, with no luck. She continued to yell, saying that \"you all need to get your shit together\". Again, I advised the patient that I was ending our conversation. She then yelled very loud saying \"you just do that then\". I ended our conversation.    Akiko advised.    Dr. Morrison advised.    Attempted to contact [facility] to find out if they filled the patient's medications last evening, but call keeps dropping. Akiko advised.  "

## 2018-07-05 NOTE — TELEPHONE ENCOUNTER
GRANDDAUGHTER, DESIREE CALLED FOR A REFILL ON PATIENTS LORTAB.  SHE STATED THAT 20 OF HER GRANDMOTHER'S PILLS WERE STOLEN AT HER ASSISTED LIVING FACILITY, [facility].    SHE IS NEEDING A REFILL TODAY.  SHE STATED THAT HER GRANDMOTHER IS FRANTIC ABOUT GETTING HER PILLS TODAY.    SHE BROUGHT UP THE CONVERSATION LAST TIME.      SHE WANTS TO BE CALLED AS SOON AS POSSIBLE PLEASE.    CONFIRMED [facility].    CONFIRMED GRANDDAUGHTER DESIREE PH # [phone]

## 2018-07-10 NOTE — TELEPHONE ENCOUNTER
While Dr Morrison was out Dr Gonzalez filled hydrocodone #12 till Dr Morrison was back. Pt needs refill on Hydrocodone sent to [facility]

## 2018-08-03 NOTE — TELEPHONE ENCOUNTER
Gaviota (ENZO) called and is wanting to get a referral for Deidre to see [facility] to get a hearing evaluation done for new hearing aids. She is scheduled with them for 8/14 @ 2:30pm. They need a referral for insurance to cover her visit.

## 2018-08-06 NOTE — TELEPHONE ENCOUNTER
----- Message from Nancy Morrison MD sent at 8/5/2018  3:58 PM EDT -----  Regarding: carvedilol  Please call [facility] and advise them I want to discontinue the carvedilol.  I stopped it in May but it got refilled automatically after that.    Also please call her granddaughter and tell her we have stopped it and that I also went ahead and sent in a refill of her pain meds so they won't need to worry about refills this month.

## 2018-08-14 PROBLEM — S06.5XAA SUBDURAL HEMATOMA (HCC): Status: ACTIVE | Noted: 2018-01-01

## 2018-08-14 PROBLEM — N39.0 ACUTE UTI (URINARY TRACT INFECTION): Status: ACTIVE | Noted: 2018-01-01

## 2018-08-14 PROBLEM — N17.9 ACUTE RENAL FAILURE (ARF) (HCC): Status: ACTIVE | Noted: 2018-01-01
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
## 2018-08-14 PROBLEM — N12 PYELONEPHRITIS: Status: ACTIVE | Noted: 2018-01-01

## 2018-08-14 NOTE — H&P
"[facility]    Name: Deidre Bobby, 89 y.o. female  MRN: [id number], : 1925   Date of Admission: 2018   PCP: Nancy Morrison MD     Chief Complaint: fall and weakness    History of Present Illness: Deidre Bobby is a(n) 89 y.o. year old female with a history of CHF, CAD s/p MI  (no stents), GERD, HLD, macular degeneration, vitamin B12 deficiency who presents as admission from [facility] to [facility] ER with fall last Wednesday (6 days ago) and progressive weakness. She was going to appointment today and could not get out of the car due to weakness, granddaughter brought her to the ER. She was found to have UTI and subdural hemorrhage. CLAIRE Bojorquez in ER spoke with neurosurgeon in [place] in consultation and they said no surgery at this time, this was discussed with patient and she does not wish to have surgery even if it were required. Patient does endorse urinary frequency and lower abdominal pressure. Denies fevers or chills. +chronic sore throat, did see ENT a few months ago and started on zantac for acid reflux.    ER course:   VS: /73 (BP Location: Right arm, Patient Position: Sitting)   Pulse 79   Temp 98 °F (36.7 °C) (Oral)   Resp 16   Ht 162.6 cm (64\")   Wt 51.3 kg (113 lb)   SpO2 94%   BMI 19.40 kg/m²    Meds: rocephin; zofran; KCl 40meq; NS 1L  Abnl Labs: UA: s.g. 1.025, 1+ bili, 1+ protein, 1+ LE, nitrite negative, 4+ bacteria, WBC 31-50; BCx x 2; BUN/Cr 39/1.60, K 3.0, lactate wnl; WBC 12K with 80% PMNs  Studies:   CT head: \"Subdural hematoma along the falx.  Recommend follow-up CT in 12-24 hours  Atrophy with chronic microvascular ischemia\"  EKG: (my read), compared to EKG from: 10/2015. Rate: 85bpm / Rhythm: NSR with PVCs / Axis: left / ST/T changes: no / Hypertrophy: no / QTc: 483ms     Patient seen at 626pm on 2018. History was provided by: chart review, discussion with ER provider (CLAIRE Bojorquez), and patient and her " granddaughter Sia.     Recent hospitalization?: no     ==============================================================================================================================================================================================================   History:   ROS: all other systems reviewed and are negative  PMHx: Patient  has a past medical history of Arthritis; Balance disorder; Congestive heart failure (CMS/[facility]); GERD (gastroesophageal reflux disease); Hyperlipidemia; Macular degeneration; Myocardial infarction (2009); and Vitamin B12 deficiency.   PSHx: Patient  has a past surgical history that includes Cholecystectomy; Hemorrhoid surgery; Hysterectomy; Laparoscopy repair hiatal hernia (2008); Posterior Lumbar/Thoracic Spine Fusion; Skin cancer excision; Finger/Thumb Arthroplasty (Right); and Cataract extraction.   FamHx: mother - colon cancer  SocHx: Patient  reports that she has never smoked. She has never used smokeless tobacco. She reports that she does not drink alcohol or use drugs.   Allergies: Patient is allergic to penicillins; codeine; and sulfa antibiotics.   Medications:   No current facility-administered medications on file prior to encounter.      Current Outpatient Prescriptions on File Prior to Encounter   Medication Sig Dispense Refill   • aspirin 81 MG tablet Take  by mouth daily.     • Cyanocobalamin ER (RA VITAMIN B-12) 1000 MCG tablet controlled-release Take  by mouth daily.     • HYDROcodone-acetaminophen (LORTAB) 5-325 MG per tablet Take 1 tablet by mouth Every 8 (Eight) Hours As Needed (arthritis pain). 90 tablet 0   • hydrocortisone (ANUSOL-HC) 2.5 % rectal cream Insert  into the rectum 2 (Two) Times a Day. 28.35 g 2   • levothyroxine (SYNTHROID, LEVOTHROID) 112 MCG tablet Take 1 tablet by mouth Daily. 90 tablet 1   • MULTIPLE VITAMINS PO Take  by mouth daily.     • pantoprazole (PROTONIX) 40 MG EC tablet Take 1 tablet by mouth Daily. 90 tablet 1   • PARoxetine (PAXIL)  "20 MG tablet Take 1 tablet by mouth Daily. 30 tablet 5   • polyethylene glycol (MIRALAX) packet Take 17 g by mouth Daily As Needed (constipation).     • Probiotic Product (PROBIOTIC DAILY PO) Take  by mouth.     • raNITIdine (ZANTAC) 150 MG tablet Take 150 mg by mouth 2 (Two) Times a Day As Needed.          ==============================================================================================================================================================================================================   Vitals:   /73 (BP Location: Right arm, Patient Position: Sitting)   Pulse 79   Temp 98 °F (36.7 °C) (Oral)   Resp 16   Ht 162.6 cm (64\")   Wt 51.3 kg (113 lb)   SpO2 94%   BMI 19.40 kg/m²    SpO2: 94 %     Intake/Output Summary (Last 24 hours) at 08/14/18 1827  Last data filed at 08/14/18 1754   Gross per 24 hour   Intake             1000 ml   Output                0 ml   Net             1000 ml        Physical Exam:   General Appearance:  Pleasant elderly female; Alert and cooperative, not in any acute distress.   Head:  Atraumatic and normocephalic, without obvious abnormality.   Eyes:          PERRL, conjunctivae and sclerae normal, no Icterus. No pallor.    Ears:  Ears appear intact with no abnormalities noted.   Throat: No oral lesions, no thrush, oral mucosa moist.   Neck: Supple, trachea midline   Back:   +kyphoscoliosis present. +CVA tenderness; +tenderness to palpation at bony prominence from prior surgery,   range of motion normal.   Lungs:   Chest shape is normal. Breath sounds heard bilaterally equally.  No crackles or wheezing. No Pleural rub or bronchial breathing.   Heart:  Normal S1 and S2, no murmur, no gallop, no rub   Abdomen:   Normal bowel sounds, no masses. Soft, non-tender, non-distended, no guarding, no rebound tenderness.   Genitourinary: deferred   Extremities: Moves all extremities well, no edema, no cyanosis, no clubbing.   Pulses: Pulses palpable and equal " bilaterally.   Skin: No bleeding. Bruising of bilateral lower extremities below knee and on right upper extremity. Macular rash on medial aspect of left elbow   Lymph nodes: No palpable adenopathy.   Neurologic: Alert and oriented x 3. Moves all four limbs equally. No tremors. No facial asymetry.       Labs:     Results from last 7 days  Lab Units 08/14/18  1623   SODIUM mmol/L 133*   POTASSIUM mmol/L 3.0*   CHLORIDE mmol/L 92*   CO2 mmol/L 28.0   BUN mg/dL 39*   CREATININE mg/dL 1.60*   CALCIUM mg/dL 9.6   BILIRUBIN mg/dL 0.8   ALK PHOS U/L 105   ALT (SGPT) U/L 35   AST (SGOT) U/L 42   GLUCOSE mg/dL 115*       Results from last 7 days  Lab Units 08/14/18  1623   WBC 10*3/mm3 12.72*   HEMOGLOBIN g/dL 13.5   HEMATOCRIT % 40.8   PLATELETS 10*3/mm3 345       Results from last 7 days  Lab Units 08/14/18  1623   LIPASE U/L 94       Results from last 7 days  Lab Units 08/14/18  1648   COLOR UA  Yellow   GLUCOSE UA  Negative   KETONES UA  Trace*   LEUKOCYTES UA  Small (1+)*   PH, URINE  5.5   BILIRUBIN UA  Small (1+)*   UROBILINOGEN UA  0.2 E.U./dL     ==============================================================================================================================================================================================================   Assessment/Plan:   Deidre Bobby is a(n) 89 y.o. year old female with:     1. Subdural hematoma, present on admission  · S/p fall at senior living on 8/8/18  · Repeat CT in next 24hours  · Check PT/PTT, may need transfusion  · Patient does not want surgery even if subdural progresses, family are aware of patients wishes  · neurochecks q4h    2. Pyelonephritis, +CVA tenderness  · Started on rocephin, f/u cultures, anticipate will need 10-14 day course    3. Acute renal failure  · S/p NS bolus in ER, continue on maintenance    4. Hypokalemia. repleted in ER. Repeat labs in AM with Mg.    5. Frequent falls and weakness, h/o b12 deficiency  · Check PO4, B12, TSH (was on synthroid  per our med list, but not on home med list provided by granddaughter)  · Consult PT/OT  · Consult SW/CM, patient currently lives at [facility], may need rehab    6. Patient noted to cough with pills in ER, consult SLP for formal evaluation    7. Will need to get medication list from [facility] in AM to verify all meds (some variances between our list and list provided by granddaughter)    // F/E/N: IVF; regular diet   // DVT PPx: SCDs due to #1  // GI PPx: zantac (home med)   // Code Status: DNR - per discussion with patient in presence of POA Sia   // NOK: Sia (granddaughter and POA) [phone]   // Dispo: admission, inpatient, need for hospitalization: IV antibiotics, therapy, f/u on subdural hematoma    Assessment and plan was discussed with the patient and her granddaughter. All questions were answered.     Time in: 626pm Time out: 725pm   Date patient seen: 8/14/2018     Ayaka Zuniga MD   6:27 PM on 8/14/2018       Addendum:  INR 1.27, not elevated enough to warrant FFP at this time, however given subdural hematoma, ordered 1x dose of vitamin K. Recheck PT/INR in AM.    8:45 PM on 8/14/2018
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
## 2018-08-14 NOTE — ED NOTES
Paged Dr. Koehler, [facility] Neuro Surgery, for CLAIRE Bojorquez, @ [phone].     Jazz Aleman  08/14/18 4244

## 2018-08-14 NOTE — ED NOTES
"PT HAS DIFFUSE BRUISING OVER HER BODY. SHE STATES SHE BRUISES VERY EASILY, \"THEY JUST TOUCH ME AND I BRUISE\"     Lupe Calderon, RN  08/14/18 1800    "

## 2018-08-14 NOTE — ED NOTES
Brad Dutton, assigned Room 330/Tele @ 1835. HARSHAD Andrade, notified.      Jazz Aleman  08/14/18 1835       Jazz Aleman  08/14/18 1835

## 2018-08-15 NOTE — PROGRESS NOTES
Discharge Planning Assessment  [facility]     Patient Name: Deidre Bobby  MRN: [id number]  Today's Date: 8/15/2018    Admit Date: 8/14/2018          Discharge Needs Assessment     Row Name 08/15/18 0905       Living Environment    Current Living Arrangements residential facility    Primary Care Provided by other (see comments)    Provides Primary Care For no one, unable/limited ability to care for self    Family Caregiver if Needed other (see comments)    Family Caregiver [facility] lives ass[facility]ited living        Discharge Needs Assessment    Concerns to be Addressed discharge planning    Outpatient/Agency/Support Group Needs inpatient rehabilitation facility            Discharge Plan     Row Name 08/15/18 0933       Plan    Plan Comments Spoke to pt regarding discharge plans She lives at [facility]   Has a granduaghter Sia whom lives nearby and assisits Uses a walker Discharge plan depends on medical condition and ability to amb     Row Name 08/15/18 0908       Plan    Plan Assisited living VS Rehab VS LTC     Patient/Family in Agreement with Plan other (see comments)        Destination     No service coordination in this encounter.      Durable Medical Equipment     No service coordination in this encounter.      Dialysis/Infusion     No service coordination in this encounter.      Home Medical Care     No service coordination in this encounter.      Social Care     No service coordination in this encounter.                Demographic Summary     Row Name 08/15/18 0904       General Information    Admission Type inpatient    Referral Source admission list            Functional Status     Row Name 08/15/18 0904       Functional Status    Usual Activity Tolerance fair       Functional Status, IADL    Medications completely dependent    Meal Preparation completely dependent    Housekeeping completely dependent    Laundry completely dependent    Shopping completely dependent       Mental Status     General Appearance WDL WDL            Psychosocial    No documentation.           Abuse/Neglect    No documentation.           Legal    No documentation.           Substance Abuse    No documentation.           Patient Forms    No documentation.         Juana Yanes

## 2018-08-15 NOTE — PLAN OF CARE
Problem: Patient Care Overview  Goal: Plan of Care Review  Outcome: Ongoing (interventions implemented as appropriate)   08/15/18 1340   Coping/Psychosocial   Plan of Care Reviewed With patient   Plan of Care Review   Progress no change   OTHER   Outcome Summary OT evaluation completed today. Pt presents with weakness, decreased strength and decreased independence with self care and functional mobility tasks. Pt is expected to benefit from skilled OT to improve her ADL independence.

## 2018-08-15 NOTE — PROGRESS NOTES
Continued Stay Note   [facility]     Patient Name: Deidre Bobby  MRN: [id number]  Today's Date: 8/15/2018    Admit Date: 8/14/2018          Discharge Plan     Row Name 08/15/18 1418       Plan    Plan Comments Spoke to pt ENZO Stovall   Pt will need Short term rehab prefers [facility]   Re[facility]               Discharge Codes    No documentation.       Expected Discharge Date and Time     Expected Discharge Date Expected Discharge Time    Aug 17, 2018             Juana Yanes
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
## 2018-08-15 NOTE — PLAN OF CARE
Problem: Patient Care Overview  Goal: Plan of Care Review  Outcome: Ongoing (interventions implemented as appropriate)   08/15/18 1792   Coping/Psychosocial   Plan of Care Reviewed With patient   OTHER   Outcome Summary PT eval completed. Patient presents with decreased strength, balance, independence and endurance. She is expected to benefit from continued skilled PT intervention to improve her overall functional mobility status prior to D/C.
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
## 2018-08-15 NOTE — PLAN OF CARE
Problem: Patient Care Overview  Goal: Plan of Care Review  Outcome: Ongoing (interventions implemented as appropriate)   08/14/18 3065   Coping/Psychosocial   Plan of Care Reviewed With patient;durable power of ;grandchild(lazara)   Plan of Care Review   Progress no change   OTHER   Outcome Summary Pt admitted this shift. Pt continues to receive iv fluids and antibiotics as ordered. Tolerates po intake and water intake encouraged. Family very supportive of care.     Goal: Individualization and Mutuality  Outcome: Ongoing (interventions implemented as appropriate)    Goal: Discharge Needs Assessment  Outcome: Ongoing (interventions implemented as appropriate)      Problem: Fall Risk (Adult)  Goal: Identify Related Risk Factors and Signs and Symptoms  Outcome: Outcome(s) achieved Date Met: 08/14/18    Goal: Absence of Fall  Outcome: Ongoing (interventions implemented as appropriate)      Problem: Skin Injury Risk (Adult)  Goal: Identify Related Risk Factors and Signs and Symptoms  Outcome: Outcome(s) achieved Date Met: 08/14/18    Goal: Skin Health and Integrity  Outcome: Ongoing (interventions implemented as appropriate)
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
## 2018-08-15 NOTE — PROGRESS NOTES
Adult Nutrition  Assessment/PES    Patient Name:  Deidre Bobby  YOB: 1925  MRN: [id number]  Admit Date:  8/14/2018    Assessment Date:  8/15/2018    Comments:  Rec. #1: Advance diet to high calorie, high protein. Pt. To receive boost TID, Ensure Pudding and Magic Cup BID. Rec. #2: Consider adding MVI and appetite stimulant. If pt./family agreeable consider nocturnal nutrition support allowing pt. To eat during hte day. RD to follow pt. Consult RD PRN.               Reason for Assessment     Row Name 08/15/18 1313          Reason for Assessment    Reason For Assessment diagnosis/disease state;nurse/nurse practitioner consult     Diagnosis metabolic state;other (see comments);cardiac disease;gastrointestinal disease   hypokalemia, ARF, pyelonephritis, subdural hematoma, CHF, CAD, GERD, Vitamin B12 deficiency     Identified At Risk by Screening Criteria BMI                 Labs/Tests/Procedures/Meds     Row Name 08/15/18 1315          Labs/Procedures/Meds    Lab Results Reviewed reviewed, pertinent     Lab Results Comments High: Glucose   Low: Na, K+, Cl        Medications    Pertinent Medications Reviewed reviewed, pertinent             Physical Findings     Row Name 08/15/18 1315          Physical Findings    Overall Physical Appearance underweight             Estimated/Assessed Needs     Row Name 08/15/18 1316          Calculation Measurements    Weight Used For Calculations 57.3 kg (126 lb 4.8 oz)        Estimated/Assessed Needs    Additional Documentation Fluid Requirements (Group);Holly-St. Jeor Equation (Group);Protein Requirements (Group);Calorie Requirements (Group)        Calorie Requirements    Estimated Calorie Requirement Comment ~2155-0753        KCAL/KG    14 Kcal/Kg (kcal) 802.06     15 Kcal/Kg (kcal) 859.35     18 Kcal/Kg (kcal) 1031.22     20 Kcal/Kg (kcal) 1145.8     25 Kcal/Kg (kcal) 1432.25     30 Kcal/Kg (kcal) 1718.7     35 Kcal/Kg (kcal) 2005.15     40 Kcal/Kg (kcal) 2291.6      45 Kcal/Kg (kcal) 2578.05     50 Kcal/Kg (kcal) 2864.5        [place]-St. Jeor Equation    RMR ([place]-St. Jeor Equation) 978.78        Protein Requirements    Est Protein Requirement Amount (gms/kg) 1.2 gm protein   ~45.83-68.75     Estimated Protein Requirements (gms/day) 68.75        Fluid Requirements    Estimated Fluid Requirement Method --   output + 1000 ml     Paige-Segar Method (over 20 kg) 2645.8             Nutrition Prescription Ordered     Row Name 08/15/18 1318          Nutrition Prescription PO    Current PO Diet Regular             Evaluation of Received Nutrient/Fluid Intake     Row Name 08/15/18 1316          Calculation Measurements    Weight Used For Calculations 57.3 kg (126 lb 4.8 oz)        PO Evaluation    Number of Days PO Intake Evaluated 1 day     Number of Meals 1     % PO Intake 50             Evaluation of Prescribed Nutrient/Fluid Intake     Row Name 08/15/18 1316          Calculation Measurements    Weight Used For Calculations 57.3 kg (126 lb 4.8 oz)             Malnutrition Severity Assessment     Row Name 08/15/18 1319          Malnutrition Severity Assessment    Malnutrition Type Chronic Illness Malnutrition        Physical Signs of Malnutrition (Chronic)    Muscle Wasting Mild     Fat Loss Mild     Secondary Physical Signs Present (comment)   temple region slight depression, prominent clavicle bone, dorsal hand depression between thumb and forefinger, legs thin minimal to no muscle definition, orbital region hollow look, depressions, dark circles,loose skin        Weight Status (Chronic)    BMI Mod (<17)     %IBW Mod <80%        Energy Intake Status (Chronic)    Energy Intake Mod (<75% / > or equal to 1 mo)        Criteria Met (Must meet criteria for severity in at least 2 of these categories: M Wasting, Fat Loss, Fluid, Secondary Signs, Wt. Status, Intake)    Patient meets criteria for  Moderate malnutrition         Problem/Interventions:        Problem 1     Row Name  08/15/18 1324          Nutrition Diagnoses Problem 1    Problem 1 Underweight     Etiology (related to) Factors Affecting Nutrition     Appetite Other   suspect poor appetite PTA     Signs/Symptoms (evidenced by) BMI;% IBW     BMI 16 - 16.9     Percent (%) IBW 78.38 %             Problem 2     Row Name 08/15/18 1324          Nutrition Diagnoses Problem 2    Problem 2 Altered Nutrition Related to Labs     Etiology (related to) Medical Diagnosis     Renal ARF     Signs/Symptoms (evidenced by) Biochemical     Specific Labs Noted Na+;K+;Chloride                   Intervention Goal     Row Name 08/15/18 1325          Intervention Goal    General Meet nutritional needs for age/condition     PO PO intake (%)     PO Intake % 75 %     TF/PN --   consider nocturnal nutrition support if pt./family agreeable allowing pt. to eat during the day     Weight Appropriate weight gain             Nutrition Intervention     Row Name 08/15/18 1326          Nutrition Intervention    RD/Tech Action Recommend/ordered;Follow Tx progress     Recommended/Ordered Supplement             Nutrition Prescription     Row Name 08/15/18 1326          Nutrition Prescription PO    PO Prescription Begin/change diet;Begin/change supplement     Supplement Boost Plus;Magic Cup;Ensure Pudding     Supplement Frequency 3 times a day;2 times a day     Other Modifiers High protein/high calorie     New PO Prescription Ordered? No, recommended   supplements ordered        Other Orders    Supplement Vitamin mineral supplement     Supplement Ordered? No, recommended     Other consider adding appetite stimulant; consider nocturnal nutrition support if pt./family agreeable allowing pt. to eat during the day.              Education/Evaluation     Row Name 08/15/18 1320          Education    Education Provided education regarding;Education topics     Education Topics Weight gain strategy   Educational materials regarding underweight nutrition therapy left at bedside for  family review        Monitor/Evaluation    Monitor Per protocol;PO intake;Supplement intake;Pertinent labs;Weight;Skin status         Electronically signed by:  Lourdes Álvarez RD  08/15/18 1:30 PM

## 2018-08-15 NOTE — PROGRESS NOTES
Malnutrition Severity Assessment    Patient Name:  Deidre Bobby  YOB: 1925  MRN: [id number]  Admit Date:  8/14/2018    Patient meets criteria for : Moderate malnutrition  Comments:  Rec. #1: Advance diet to high calorie, high protein. Pt. To receive boost TID, Ensure Pudding and Magic Cup BID. Rec. #2: Consider adding MVI and appetite stimulant. If pt./family agreeable consider nocturnal nutrition support allowing pt. To eat during the day. RD to follow pt. Consult RD PRN.     Malnutrition Type: Chronic Illness Malnutrition     Malnutrition Type (last 8 hours)      Malnutrition Severity Assessment     Row Name 08/15/18 1319       Malnutrition Severity Assessment    Malnutrition Type Chronic Illness Malnutrition    Row Name 08/15/18 1319       Physical Signs of Malnutrition (Chronic)    Muscle Wasting Mild    Fat Loss Mild    Secondary Physical Signs Present (comment)   temple region slight depression, prominent clavicle bone, dorsal hand depression between thumb and forefinger, legs thin minimal to no muscle definition, orbital region hollow look, depressions, dark circles,loose skin    Row Name 08/15/18 1319       Weight Status (Chronic)    BMI Mod (<17)    %IBW Mod <80%    Row Name 08/15/18 1319       Energy Intake Status (Chronic)    Energy Intake Mod (<75% / > or equal to 1 mo)    Row Name 08/15/18 1319       Criteria Met (Must meet criteria for severity in at least 2 of these categories: M Wasting, Fat Loss, Fluid, Secondary Signs, Wt. Status, Intake)    Patient meets criteria for  Moderate malnutrition          Electronically signed by:  Lourdes Álvarez RD  08/15/18 1:22 PM

## 2018-08-15 NOTE — PROGRESS NOTES
[facility]    Name:  Deidre Bobby   Age:  89 y.o.  Sex:  female  :  1925  MRN:  [id number]   Visit Number:  [id number]  Admission Date:  2018  Date Of Service:  08/15/18  Primary Care Physician:  Nancy Morrison MD     LOS: 1 day :      Chief Complaint:  Follow up subdural hematoma and UTI with pyelonephritis        Subjective / Interval History: Patient is a 89-year-old lady from assisted living, who presented post fall at home to the ER.  She was noted to have a subdural hematoma.  Neurosurgery contacted and patient not a candidate for surgery.  The patient did not want any surgery anyways.  She was admitted for further evaluation and treatment.  She also had a urinary tract infection with positive CVA tenderness consistent with pyelonephritis.    The patient was seen early this morning.  She was sitting up in bed in no acute distress.  She denied any abdominal pain, nausea, vomiting, cough, chest pain.  She does admit to generalized weakness and frequent falls at home.  She denies any headache or acute vision changes but she does have reported chronic severe visual loss.      Review of Systems:     General ROS: Patient denies any fevers, chills or loss of consciousness.  Generalized weakness  Ophthalmic ROS: Denies any diplopia or transient loss of vision.  ENT ROS: Denies sore throat, nasal congestion or ear pain.   Respiratory ROS: Denies cough or shortness of breath.  Cardiovascular ROS: Denies chest pain or palpitations. No history of exertional chest pain.   Gastrointestinal ROS: Denies nausea and vomiting.  Resolved CVA abdominal pain. No diarrhea.  Genito-Urinary ROS: Denies dysuria or hematuria.  Musculoskeletal ROS: Denies back pain. No muscle pain. No calf pain.  Neurological ROS: Denies any focal weakness. No loss of consciousness. Denies any numbness. Denies neck pain.   Dermatological ROS: Denies any redness or pruritis.    Vital  Signs:    Temp:  [97.2 °F (36.2 °C)-98 °F (36.7 °C)] 98 °F (36.7 °C)  Heart Rate:  [73-91] 79  Resp:  [16-20] 20  BP: (130-187)/() 185/98    Intake and output:    I/O last 3 completed shifts:  In: 2100 [I.V.:1000; IV Piggyback:1100]  Out: 1500 [Urine:1500]  I/O this shift:  In: 480 [P.O.:480]  Out: 500 [Urine:500]    Physical Examination:    General Appearance:    Elderly lady sitting up in bed.  Alert and cooperative, not in any acute distress.   Head:    Atraumatic and normocephalic, without obvious abnormality.   Eyes:            PERRLA, conjunctivae and sclerae normal, no Icterus. No pallor. Extraocular movements are within normal limits.   Throat:   No oral lesions, no thrush, oral mucosa moist.   Neck:   Supple, trachea midline, no thyromegaly   Lungs:     Chest shape is normal. Breath sounds heard bilaterally equally.  No crackles or wheezing. No Pleural rub or bronchial breathing.    Heart:    Normal S1 and S2, no murmur, no gallop, no rub. No JVD   Abdomen:     Normal bowel sounds, no masses, no organomegaly. Soft        non-tender, non-distended, no guarding, no rebound                tenderness   Extremities:   Moves all extremities well, no edema, no cyanosis, no             clubbing   Skin:   No bleeding, bruising or rash. echymoses lower legs.   Neurologic:   Cranial nerves 2 - 12 grossly intact, sensation intact, Motor power is normal and equal bilaterally. diffuse muscle atrophy    Laboratory results:    Lab Results (last 24 hours)     Procedure Component Value Units Date/Time    Protime-INR [[id number]]  (Abnormal) Collected:  08/15/18 0816    Specimen:  Blood Updated:  08/15/18 0905     Protime 13.6 (H) Seconds      INR 1.22 (H)    Vitamin B12 [[id number]]  (Abnormal) Collected:  08/15/18 0638    Specimen:  Blood Updated:  08/15/18 0821     Vitamin B-12 >1,000 (H) pg/mL     Magnesium [[id number]]  (Normal) Collected:  08/15/18 0638    Specimen:  Blood Updated:  08/15/18 0814     Magnesium 1.6  mg/dL     TSH [[id number]]  (Normal) Collected:  08/15/18 0638    Specimen:  Blood Updated:  08/15/18 0804     TSH 1.110 mIU/mL     Basic Metabolic Panel [[id number]]  (Abnormal) Collected:  08/15/18 0637    Specimen:  Blood Updated:  08/15/18 0711     Glucose 124 (H) mg/dL      BUN 20 mg/dL      Creatinine 0.80 mg/dL      Sodium 133 (L) mmol/L      Potassium 3.1 (L) mmol/L      Chloride 94 (L) mmol/L      CO2 29.0 mmol/L      Calcium 8.6 mg/dL      eGFR Non African Amer 67 mL/min/1.73      BUN/Creatinine Ratio 25.0 (H)     Anion Gap 13.1 mmol/L     Narrative:       The MDRD GFR formula is only valid for adults with stable renal function between ages 18 and 70.    Phosphorus [[id number]]  (Normal) Collected:  08/15/18 0637    Specimen:  Blood Updated:  08/15/18 0709     Phosphorus 2.5 mg/dL     CBC Auto Differential [[id number]]  (Abnormal) Collected:  08/15/18 0637    Specimen:  Blood Updated:  08/15/18 0655     WBC 11.62 (H) 10*3/mm3      RBC 4.21 10*6/mm3      Hemoglobin 13.0 g/dL      Hematocrit 39.3 %      MCV 93.3 fL      MCH 30.9 pg      MCHC 33.1 g/dL      RDW 12.6 %      RDW-SD 43.0 fl      MPV 10.1 fL      Platelets 316 10*3/mm3      Neutrophil % 81.7 (H) %      Lymphocyte % 8.1 (L) %      Monocyte % 9.2 %      Eosinophil % 0.2 %      Basophil % 0.3 %      Immature Grans % 0.5 %      Neutrophils, Absolute 9.50 (H) 10*3/mm3      Lymphocytes, Absolute 0.94 10*3/mm3      Monocytes, Absolute 1.07 (H) 10*3/mm3      Eosinophils, Absolute 0.02 10*3/mm3      Basophils, Absolute 0.03 10*3/mm3      Immature Grans, Absolute 0.06 10*3/mm3      nRBC 0.0 /100 WBC     Urine Culture - Urine, [[id number]]  (Abnormal) Collected:  08/14/18 1648    Specimen:  Urine from Urine, Catheter Updated:  08/15/18 0602     Urine Culture >100,000 CFU/mL Gram Negative Bacilli (A)     Comment: Identification and DAVIS to follow.         Blood Culture - Blood, [[id number]]  (Normal) Collected:  08/14/18 1620    Specimen:  Blood from Arm, Left  Updated:  08/15/18 0545     Blood Culture No growth at less than 24 hours    Blood Culture - Blood, [[id number]]  (Normal) Collected:  08/14/18 1626    Specimen:  Blood from Arm, Right Updated:  08/15/18 0445     Blood Culture No growth at less than 24 hours    aPTT [[id number]]  (Abnormal) Collected:  08/14/18 1910    Specimen:  Blood Updated:  08/14/18 1943     PTT 24.4 (L) seconds     Protime-INR [[id number]]  (Abnormal) Collected:  08/14/18 1910    Specimen:  Blood Updated:  08/14/18 1942     Protime 14.1 (H) Seconds      INR 1.27 (H)    TSH [[id number]]  (Normal) Collected:  08/14/18 1623    Specimen:  Blood Updated:  08/14/18 1754     TSH 3.130 mIU/mL     Urinalysis, Microscopic Only - Urine, Clean Catch [[id number]]  (Abnormal) Collected:  08/14/18 1648    Specimen:  Urine from Urine, Catheter Updated:  08/14/18 1712     RBC, UA 0-2 (A) /HPF      WBC, UA 31-50 (A) /HPF      Bacteria, UA 4+ (A) /HPF      Squamous Epithelial Cells, UA 0-2 /HPF      Hyaline Casts, UA None Seen /LPF      Methodology Manual Light Microscopy    Urinalysis With Culture If Indicated - Urine, Catheter [[id number]]  (Abnormal) Collected:  08/14/18 1648    Specimen:  Urine from Urine, Catheter Updated:  08/14/18 1710     Color, UA Yellow     Appearance, UA Slightly Cloudy (A)     pH, UA 5.5     Specific Gravity, UA 1.025     Glucose, UA Negative     Ketones, UA Trace (A)     Bilirubin, UA Small (1+) (A)     Blood, UA Trace (A)     Protein, UA 30 mg/dL (1+) (A)     Leuk Esterase, UA Small (1+) (A)     Nitrite, UA Negative     Urobilinogen, UA 0.2 E.U./dL    Comprehensive Metabolic Panel [[id number]]  (Abnormal) Collected:  08/14/18 1623    Specimen:  Blood Updated:  08/14/18 1700     Glucose 115 (H) mg/dL      BUN 39 (H) mg/dL      Creatinine 1.60 (H) mg/dL      Sodium 133 (L) mmol/L      Potassium 3.0 (L) mmol/L      Chloride 92 (L) mmol/L      CO2 28.0 mmol/L      Calcium 9.6 mg/dL      Total Protein 7.6 g/dL      Albumin 4.20 g/dL      ALT  (SGPT) 35 U/L      AST (SGOT) 42 U/L      Alkaline Phosphatase 105 U/L      Total Bilirubin 0.8 mg/dL      eGFR Non African Amer 30 (L) mL/min/1.73      Globulin 3.4 gm/dL      A/G Ratio 1.2 g/dL      BUN/Creatinine Ratio 24.4 (H)     Anion Gap 16.0 mmol/L     Narrative:       The MDRD GFR formula is only valid for adults with stable renal function between ages 18 and 70.    Lipase [[id number]]  (Normal) Collected:  08/14/18 1623    Specimen:  Blood Updated:  08/14/18 1700     Lipase 94 U/L     Magnesium [[id number]]  (Normal) Collected:  08/14/18 1623    Specimen:  Blood Updated:  08/14/18 1700     Magnesium 2.1 mg/dL     Lactic Acid, Plasma [[id number]]  (Normal) Collected:  08/14/18 1623    Specimen:  Blood Updated:  08/14/18 1659     Lactate 1.4 mmol/L     CBC & Differential [[id number]] Collected:  08/14/18 1623    Specimen:  Blood Updated:  08/14/18 1641    Narrative:       The following orders were created for panel order CBC & Differential.  Procedure                               Abnormality         Status                     ---------                               -----------         ------                     CBC Auto Differential[[id number]]        Abnormal            Final result                 Please view results for these tests on the individual orders.    CBC Auto Differential [[id number]]  (Abnormal) Collected:  08/14/18 1623    Specimen:  Blood Updated:  08/14/18 1641     WBC 12.72 (H) 10*3/mm3      RBC 4.34 10*6/mm3      Hemoglobin 13.5 g/dL      Hematocrit 40.8 %      MCV 94.0 fL      MCH 31.1 (H) pg      MCHC 33.1 g/dL      RDW 12.5 %      RDW-SD 43.5 fl      MPV 9.8 fL      Platelets 345 10*3/mm3      Neutrophil % 80.8 (H) %      Lymphocyte % 7.9 (L) %      Monocyte % 10.4 %      Eosinophil % 0.1 %      Basophil % 0.2 %      Immature Grans % 0.6 %      Neutrophils, Absolute 10.28 (H) 10*3/mm3      Lymphocytes, Absolute 1.01 10*3/mm3      Monocytes, Absolute 1.32 (H) 10*3/mm3      Eosinophils,  Absolute 0.01 10*3/mm3      Basophils, Absolute 0.02 10*3/mm3      Immature Grans, Absolute 0.08 (H) 10*3/mm3      nRBC 0.0 /100 WBC           I have reviewed the patient's laboratory results.    Radiology results:    Imaging Results (last 24 hours)     Procedure Component Value Units Date/Time    CT Head Without Contrast [[id number]] Collected:  08/15/18 1001     Updated:  08/15/18 1011    Narrative:       CT HEAD WITHOUT CONTRAST-     HISTORY: Eval status of subdural hematoma; N39.0-Urinary tract  infection, site not specified; I62.00-Nontraumatic subdural hemorrhage,  unspecified.     TECHNIQUE: Contiguous axial images were obtained from the skull vertex  to the skull base without administration of contrast.     FINDINGS: Comparison with 08/14/2018. Again identified is a left  parafalcine subdural hematoma superiorly. This measures 5 mm in maximal  thickness which is unchanged from previous. No other areas of new  hemorrhage compared to previous. No mass effect or midline shift. The  basal cisterns are patent. Ventricles are not enlarged. Diffuse cortical  volume loss and nonspecific white matter changes, likely related to  chronic microvascular ischemia. Prominent vascular calcifications.  Mandibular condyles are subluxed inferiorly, probably positional but  should be correlated clinically. Faint opacification of the left mastoid  air cell base.       Impression:       No significant change in the small left parafalcine subdural  hematoma. Additional follow-up recommended.                       This study was performed with techniques to keep radiation doses as low  as reasonably achievable (ALARA). Individualized dose reduction  techniques using automated exposure control or adjustment of mA and/or  kV according to the patient size were employed.      This report was finalized on 8/15/2018 10:09 AM by Perfecto Diaz MD.    XR Chest 1 View [[id number]] Collected:  08/15/18 0913     Updated:  08/15/18 0916     Narrative:       SINGLE VIEW CHEST     INDICATION: Altered mental status.     FINDINGS: Single frontal view of the chest without comparison. EKG leads  overlie the chest. Probable significant kyphosis. Heart size is normal.  Aorta is ectatic and partially calcified. No pneumothorax. Areas of  scarring and atelectasis involve the lung bases. Prominent degenerative  changes in the spine and shoulders.       Impression:       Atelectasis and/or scarring in the lung bases. When  clinically feasible, short-term follow-up PA and lateral views would be  useful for better evaluation.     This report was finalized on 8/15/2018 9:14 AM by Perfecto Diaz MD.    CT Head Without Contrast [[id number]] Collected:  08/14/18 1717     Updated:  08/14/18 1718    Narrative:       FINAL REPORT    TECHNIQUE:  Multiple contiguous transaxial slices through the head were  obtained without the intravenous administration of contrast.    CLINICAL HISTORY:  Altered mental status    FINDINGS:  There is an acute high density subdural hematoma along the falx  posteriorly measuring 5 mm in greatest dimension.  There is no  mass effect or shift.  There is age-appropriate cortical atrophy  with associated ventricular enlargement. Patchy periventricular  white matter low attenuation is most consistent with chronic  microvascular ischemia. There is no acute infarct or mass  effect. If concern persists, recommend MRI. There is no sinus  air-fluid level. The calvarium is intact.      Impression:       Subdural hematoma along the falx.  Recommend follow-up CT in  12-24 hours  Atrophy with chronic microvascular ischemia    Authenticated by Katy Wong MD on 08/14/2018 05:17:55 PM          I have reviewed the patient's radiology reports.    Medication Review:     I have reviewed the patients active and prn medications.     Assessment:  1. Acute subdural hematoma, post fall at assisted living facility, stable, nonoperative  2. Acute pyelonephritis, prior  to admission, with gram negative swathi  3. Acute kidney injury  4Hypomagnesemia  5Hypokalemia  6Mildly elevated INR, improved post vitamin K x 1  7Advanced age  8Frequent falls and weakness  9History of B12 deficiency  10Dysphagia ruled out    Plan:  Repeat CT head showed stability. Patient will get PT,OT. Neurochecks.  Recommend acute rehab placement. Patient agreeable. POA agreed.     Patient declined any surgery even if needed due to advanced age. She is DNR.     Continue ceftriaxone, 7 day. Continue daily labs and titrate medications accordingly. Follow urine culture.    Anticipate discharge to acute rehab on Friday.    Medication risks and benefits were discussed in detail. Patient reported satisfaction with care delivered and treatment plan.     Martha Groves DO  08/15/18  3:21 PM

## 2018-08-15 NOTE — PLAN OF CARE
Problem: Patient Care Overview  Goal: Plan of Care Review  Outcome: Ongoing (interventions implemented as appropriate)   08/15/18 1204   Coping/Psychosocial   Plan of Care Reviewed With patient   OTHER   Outcome Summary Bedside eval completed. No oral phase dysphagia. No overt s/s aspiration or other pharyngeal phase dysphagia. Pt. has prior dx of GERD. She has difficulty only with swallowing large meds per pt.'s report. See report for details. Recommend: 1. reg diet with thin liq as carlos, 2. meds whole, cut or crushed as allowable if large, 3. aspiration precautions, 4. reflux precautions.

## 2018-08-16 NOTE — THERAPY TREATMENT NOTE
Acute Care - Physical Therapy Treatment Note  [facility]     Patient Name: Deidre Bobby  : 1925  MRN: [id number]  Today's Date: 2018  Onset of Illness/Injury or Date of Surgery: 18  Date of Referral to PT: 18  Referring Physician: Dr. Zuniga    Admit Date: 2018    Visit Dx:    ICD-10-CM ICD-9-CM   1. Acute UTI (urinary tract infection) N39.0 599.0   2. Subdural hematoma (CMS/HCC) I62.00 432.1   3. Impaired functional mobility, balance, gait, and endurance Z74.09 V49.89     Patient Active Problem List   Diagnosis   • Primary osteoarthritis involving multiple joints   • Impairment of balance   • Chronic diastolic congestive heart failure (CMS/HCC)   • Cardiac disease   • Hypercholesterolemia   • History of myocardial infarction   • Macular degeneration   • Hypothyroidism due to acquired atrophy of thyroid   • Cobalamin deficiency   • Chronic back pain   • Chronic constipation with overflow   • Oral phase dysphagia   • Subdural hematoma (CMS/HCC)   • Fall at home   • Pyelonephritis   • Acute renal failure (ARF) (CMS/HCC)       Therapy Treatment          Rehabilitation Treatment Summary     Row Name 18 1348             Treatment Time/Intention    Discipline physical therapist  -LM      Document Type therapy note (daily note)  -LM      Subjective Information complains of;weakness  -LM      Mode of Treatment physical therapy  -LM      Patient/Family Observations Pt received supine in bed.IV intact.  -LM      Care Plan Review care plan/treatment goals reviewed;risks/benefits reviewed;current/potential barriers reviewed;patient/other agree to care plan  -LM      Total Minutes, Physical Therapy Treatment 23  -LM      Therapy Frequency (PT Clinical Impression) daily  -LM      Patient Effort adequate  -LM      Existing Precautions/Restrictions fall  -LM      Patient Response to Treatment Tolerated well.  -LM      Recorded by [LM] Pippa Norman, PT [phone]      Row Name  08/16/18 1348             Safety Issues, Functional Mobility    Safety Issues Affecting Function (Mobility) safety precaution awareness;safety precautions follow-through/compliance;sequencing abilities  -LM      Impairments Affecting Function (Mobility) balance;coordination;endurance/activity tolerance;strength  -LM      Recorded by [LM] Pippa Norman, PT 08/16/18 1607      Row Name 08/16/18 1348             Bed Mobility Assessment/Treatment    Bed Mobility Assessment/Treatment supine-sit  -LM      Supine-Sit [place] (Bed Mobility) minimum assist (75% patient effort)  -LM      Bed Mobility, Safety Issues decreased use of arms for pushing/pulling;decreased use of legs for bridging/pushing;impaired trunk control for bed mobility  -LM      Assistive Device (Bed Mobility) draw sheet;head of bed elevated  -LM      Recorded by [LM] Pippa Norman, PT 08/16/18 1607      Row Name 08/16/18 1348             Transfer Assessment/Treatment    Transfer Assessment/Treatment sit-stand transfer;stand-sit transfer;bed-chair transfer  -LM      Recorded by [LM] Pippa Norman, PT 08/16/18 1607      Row Name 08/16/18 1348             Bed-Chair Transfer    Bed-Chair [place] (Transfers) minimum assist (75% patient effort);moderate assist (50% patient effort)  -LM      Assistive Device (Bed-Chair Transfers) other (see comments)   rollator  -LM      Recorded by [LM] Pippa Norman, PT 08/16/18 1607      Row Name 08/16/18 1348             Sit-Stand Transfer    Sit-Stand [place] (Transfers) minimum assist (75% patient effort);moderate assist (50% patient effort)  -LM      Assistive Device (Sit-Stand Transfers) other (see comments)   rollator  -LM      Recorded by [LM] Pippa Norman, PT 08/16/18 1607      Row Name 08/16/18 1348             Stand-Sit Transfer    Stand-Sit [place] (Transfers) minimum assist (75% patient effort);other (see comments)  -LM      Assistive Device (Stand-Sit Transfers) other (see comments)   rollator   -LM      Recorded by [LM] Pippa Norman, PT 08/16/18 1607      Row Name 08/16/18 1348             Gait/Stairs Assessment/Training    Gait/Stairs Assessment/Training gait/ambulation assistive device  -LM      [place] Level (Gait) minimum assist (75% patient effort);moderate assist (50% patient effort)  -LM      Assistive Device (Gait) other (see comments)   rollator  -LM      Distance in Feet (Gait) 24  -LM      Pattern (Gait) step-to  -LM      Deviations/Abnormal Patterns (Gait) base of support, narrow;sharath decreased;festinating/shuffling;gait speed decreased;stride length decreased  -LM      Bilateral Gait Deviations heel strike decreased;weight shift ability decreased   Leans backwards  -LM      Comment (Gait/Stairs) Leans backward in sit and stand.  -LM      Recorded by [LM] Pippa Norman, PT 08/16/18 1607      Row Name 08/16/18 1348             Positioning and Restraints    Pre-Treatment Position in bed  -LM      Post Treatment Position chair  -LM      In Chair reclined;call light within reach;encouraged to call for assist  -LM      Recorded by [LM] Pippa Norman, PT 08/16/18 1607      Row Name 08/16/18 1348             Pain Scale: Numbers Pre/Post-Treatment    Pain Scale: Numbers, Pretreatment 0/10 - no pain  -LM      Pain Scale: Numbers, Post-Treatment 0/10 - no pain  -LM      Recorded by [LM] Pippa Norman, PT 08/16/18 1607      Row Name                Wound 08/14/18 2030 Left lower;anterior  skin tear    Wound - Properties Group Date first assessed: 08/14/18 [CR] Time first assessed: 2030 [CR] Present On Admission : yes [CR] Side: Left [CR] Orientation: lower;anterior [CR] Type: skin tear [CR] Recorded by:  [CR] Denny Rainey LPN 08/15/18 0155    Row Name 08/16/18 1348             Outcome Summary/Treatment Plan (PT)    Daily Summary of Progress (PT) progress toward functional goals as expected  -LM      Plan for Continued Treatment (PT) Cont PT per POC to goals.  -LM      Anticipated Discharge  Disposition (PT) inpatient rehabilitation facility  -LM      Recorded by [LM] Pippa Norman, PT 08/16/18 1607        User Key  (r) = Recorded By, (t) = Taken By, (c) = Cosigned By    Initials Name Effective Dates Discipline    CR Denny Rainey, LPN 10/26/16 -  Nurse    Pippa Colin, PT 04/03/18 -  PT          Wound 08/14/18 2030 Left lower;anterior  skin tear (Active)   Dressing Appearance dry;intact;no drainage 8/16/2018  4:00 AM               PT Rehab Goals     Row Name 08/16/18 1348             Bed Mobility Goal 1 (PT)    Progress/Outcomes (Bed Mobility Goal 1, PT) goal ongoing  -LM         Transfer Goal 1 (PT)    Progress/Outcome (Transfer Goal 1, PT) goal ongoing  -LM         Gait Training Goal 1 (PT)    Progress/Outcome (Gait Training Goal 1, PT) goal ongoing  -LM         Patient Education Goal (PT)    Progress/Outcome (Patient Education Goal, PT) goal ongoing  -LM        User Key  (r) = Recorded By, (t) = Taken By, (c) = Cosigned By    Initials Name Provider Type Discipline    LM Pippa Norman, PT Physical Therapist PT          Physical Therapy Education     Title: PT OT SLP Therapies (Active)     Topic: Physical Therapy (Done)     Point: Mobility training (Done)    Learning Progress Summary     Learner Status Readiness Method Response Comment Documented by    Patient Done Acceptance E,TB VU Proper use of RW for safe transfers/ambulation.  08/16/18 1607     Done Acceptance E,TB VU Purpose of PT/POC.  08/15/18 1342          Point: Home exercise program (Done)    Learning Progress Summary     Learner Status Readiness Method Response Comment Documented by    Patient Done Acceptance E,TB VU Proper use of RW for safe transfers/ambulation.  08/16/18 1607     Done Acceptance E,TB VU Purpose of PT/POC.  08/15/18 1342          Point: Precautions (Done)    Learning Progress Summary     Learner Status Readiness Method Response Comment Documented by    Patient Done Acceptance E,TB VU Proper use of RW for  safe transfers/ambulation.  08/16/18 1607     Done Acceptance E,TB VU Purpose of PT/POC.  08/15/18 1342                      User Key     Initials Effective Dates Name Provider Type Discipline     04/03/18 -  Pippa Norman, PT Physical Therapist PT                    PT Recommendation and Plan  Anticipated Discharge Disposition (PT): inpatient rehabilitation facility  Planned Therapy Interventions (PT Eval): balance training, bed mobility training, gait training, home exercise program, patient/family education, strengthening, transfer training  Therapy Frequency (PT Clinical Impression): daily  Outcome Summary/Treatment Plan (PT)  Daily Summary of Progress (PT): progress toward functional goals as expected  Plan for Continued Treatment (PT): Cont PT per POC to goals.  Anticipated Discharge Disposition (PT): inpatient rehabilitation facility  Plan of Care Reviewed With: patient  Progress: improving  Outcome Summary: PT treatment completed.  Patient requires min A for sup to sit and min-mod A sit to stand and to ambulate 24' with rollator.  Cont to goals.          Outcome Measures     Row Name 08/16/18 1348 08/15/18 1056 08/15/18 1055       How much help from another person do you currently need...    Turning from your back to your side while in flat bed without using bedrails? 3  -LM  -- 3  -LM    Moving from lying on back to sitting on the side of a flat bed without bedrails? 3  -LM  -- 3  -LM    Moving to and from a bed to a chair (including a wheelchair)? 3  -LM  -- 3  -LM    Standing up from a chair using your arms (e.g., wheelchair, bedside chair)? 3  -LM  -- 3  -LM    Climbing 3-5 steps with a railing? 1  -LM  -- 3  -LM    To walk in hospital room? 2  -LM  -- 3  -LM    AM-PAC 6 Clicks Score 15  -LM  -- 18  -LM       How much help from another is currently needed...    Putting on and taking off regular lower body clothing?  -- 3  -AH  --    Bathing (including washing, rinsing, and drying)  -- 3  -AH  --     Toileting (which includes using toilet bed pan or urinal)  -- 3  -AH  --    Putting on and taking off regular upper body clothing  -- 4  -AH  --    Taking care of personal grooming (such as brushing teeth)  -- 4  -AH  --    Eating meals  -- 4  -AH  --    Score  -- 21  -AH  --       Functional Assessment    Outcome Measure Options AM-PAC 6 Clicks Basic Mobility (PT)  -LM AM-PAC 6 Clicks Daily Activity (OT)  - AM-PAC 6 Clicks Basic Mobility (PT)  -LM      User Key  (r) = Recorded By, (t) = Taken By, (c) = Cosigned By    Initials Name Provider Type     Joan Carter Occupational Therapist    LM Pippa Norman PT Physical Therapist           Time Calculation:         PT Charges     Row Name 08/16/18 1609             Time Calculation    Start Time 1348  -LM      PT Received On 08/16/18  -         Time Calculation- PT    Total Timed Code Minutes- PT 23 minute(s)  -        User Key  (r) = Recorded By, (t) = Taken By, (c) = Cosigned By    Initials Name Provider Type     Pippa Norman, JOAQUIN Physical Therapist        Therapy Suggested Charges     Code   Minutes Charges    None           Therapy Charges for Today     Code Description Service Date Service Provider Modifiers Qty    02755772566 HC PT EVAL LOW COMPLEXITY 4 8/15/2018 Pippa Norman, PT GP 1    09673793170 HC GAIT TRAINING EA 15 MIN 8/16/2018 Pippa Norman, PT GP 1    [id number]  PT THERAPEUTIC ACT EA 15 MIN 8/16/2018 Pippa Norman, PT GP 1          PT G-Codes  Outcome Measure Options: AM-PAC 6 Clicks Basic Mobility (PT)    Pippa Norman PT  8/16/2018

## 2018-08-16 NOTE — PLAN OF CARE
Problem: Patient Care Overview  Goal: Plan of Care Review  Outcome: Ongoing (interventions implemented as appropriate)   08/16/18 0140   Coping/Psychosocial   Plan of Care Reviewed With patient   Plan of Care Review   Progress no change   OTHER   Outcome Summary Pt continues to receive iv fluids and antibiotics as ordered. Tolerating diet well. Verbalized no complaints this shift. Reorients well with verbal cues.      Goal: Individualization and Mutuality  Outcome: Ongoing (interventions implemented as appropriate)    Goal: Discharge Needs Assessment  Outcome: Ongoing (interventions implemented as appropriate)      Problem: Fall Risk (Adult)  Goal: Absence of Fall  Outcome: Ongoing (interventions implemented as appropriate)      Problem: Skin Injury Risk (Adult)  Goal: Skin Health and Integrity  Outcome: Ongoing (interventions implemented as appropriate)

## 2018-08-16 NOTE — PLAN OF CARE
Problem: Patient Care Overview  Goal: Plan of Care Review  Outcome: Ongoing (interventions implemented as appropriate)   08/16/18 3612   Coping/Psychosocial   Plan of Care Reviewed With patient   Plan of Care Review   Progress improving   OTHER   Outcome Summary PT treatment completed. Patient requires min A for sup to sit and min-mod A sit to stand and to ambulate 24' with rollator. Cont to goals.

## 2018-08-16 NOTE — PROGRESS NOTES
Continued Stay Note   [facility]     Patient Name: Deidre Bobby  MRN: [id number]  Today's Date: 8/16/2018    Admit Date: 8/14/2018          Discharge Plan     Row Name 08/16/18 1332       Plan    Plan Short term rehab     Patient/Family in Agreement with Plan yes    Plan Comments Pt has been accpeted to [facility] when medically ready for DC               Discharge Codes    No documentation.       Expected Discharge Date and Time     Expected Discharge Date Expected Discharge Time    Aug 17, 2018             Juana Yanes

## 2018-08-17 NOTE — PLAN OF CARE
Problem: Fall Risk (Adult)  Intervention: Review Medications/Identify Contributors to Fall Risk   08/17/18 0401   Safety Management   Medication Review/Management medications reviewed       Goal: Absence of Fall  Outcome: Ongoing (interventions implemented as appropriate)

## 2018-08-17 NOTE — PROGRESS NOTES
[facility]    Name:  Deidre Bobby   Age:  89 y.o.  Sex:  female  :  1925  MRN:  [id number]   Visit Number:  [id number]  Admission Date:  2018  Date Of Service:  18  Primary Care Physician:  Nancy Morrison MD     LOS: 2 days :      Chief Complaint:  Follow up UTI with pyelonephritis and subdural hematoma        Subjective / Interval History:  The patient was seen this morning. She was sitting up in bed. She had mild confusion very early this morning when waking but improving today. Granddaughter? At bedside. The patient denies headaches, chest pain, shortness of breath, abdominal pain. She is feeling generally weak. She had fall prior to admission with many previous falls.      Review of Systems:     General ROS: Patient denies any fevers, chills or loss of consciousness.  Generalized weakness still   Ophthalmic ROS: Denies any diplopia or transient loss of vision.  ENT ROS: Denies sore throat, nasal congestion or ear pain.   Respiratory ROS: Denies cough or shortness of breath.  Cardiovascular ROS: Denies chest pain or palpitations. No history of exertional chest pain.   Gastrointestinal ROS: Denies nausea and vomiting.  Resolved CVA abdominal pain. No diarrhea.  Genito-Urinary ROS: Denies dysuria or hematuria.  Musculoskeletal ROS: Denies back pain. No muscle pain. No calf pain.  Neurological ROS: Denies any focal weakness. No loss of consciousness. Denies any numbness. Denies neck pain.   Dermatological ROS: Denies any redness or pruritis.    Vital Signs:    Temp:  [98.4 °F (36.9 °C)-98.8 °F (37.1 °C)] 98.8 °F (37.1 °C)  Heart Rate:  [78-94] 78  Resp:  [18] 18  BP: (136-180)/(82-93) 179/90    Intake and output:    I/O last 3 completed shifts:  In: 2640 [P.O.:1440; I.V.:1000; IV Piggyback:200]  Out: 1950 [Urine:1950]  I/O this shift:  In: -   Out: 200 [Urine:200]    Physical Examination:    General Appearance:    Smiling. Hard of hearing.  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 Elderly lady sitting up again today in bed.  Alert and cooperative, not in any acute distress.   Head:    Atraumatic and normocephalic, without obvious abnormality.   Eyes:            PERRLA, conjunctivae and sclerae normal, no Icterus. No pallor. Extraocular movements are within normal limits.   Throat:   No oral lesions, no thrush, oral mucosa moist.   Neck:   Supple, trachea midline, no thyromegaly   Lungs:     Clear bilaterally. Reduced bases. No wheeze or rhonchi    Heart:    Normal S1 and S2, no murmur, no gallop, no rub. No JVD   Abdomen:     Normal bowel sounds, no masses, no organomegaly. Soft        non-tender, non-distended, no guarding, no rebound                tenderness   Extremities:   Moves all extremities well, no edema, no cyanosis, no             clubbing   Skin:   No bleeding, bruising or rash. echymoses lower legs.   Neurologic:   Diffuse muscle atrophy. sensation intact, motor 4/5 all extremities   Laboratory results:    Lab Results (last 24 hours)     Procedure Component Value Units Date/Time    Blood Culture - Blood, [[id number]]  (Normal) Collected:  08/14/18 1623    Specimen:  Blood from Arm, Left Updated:  08/16/18 1745     Blood Culture No growth at 2 days    Blood Culture - Blood, [[id number]]  (Normal) Collected:  08/14/18 1626    Specimen:  Blood from Arm, Right Updated:  08/16/18 1645     Blood Culture No growth at 2 days    Urine Culture - Urine, [[id number]]  (Abnormal)  (Susceptibility) Collected:  08/14/18 1648    Specimen:  Urine from Urine, Catheter Updated:  08/16/18 0823     Urine Culture >100,000 CFU/mL Escherichia coli (A)    Susceptibility      Escherichia coli     DAVIS     Amikacin <=16 ug/ml Susceptible     Ampicillin <=8 ug/ml Susceptible     Ampicillin + Sulbactam <=8/4 ug/ml Susceptible     Aztreonam <=4 ug/ml Susceptible     Cefazolin <=8 ug/ml Susceptible     Cefepime <=4 ug/ml Susceptible     Cefotaxime <=2 ug/ml  Susceptible     Cefoxitin <=8 ug/ml Susceptible     Ceftazidime <=1 ug/ml Susceptible     Ceftriaxone <=8 ug/ml Susceptible     Cefuroxime sodium <=4 ug/ml Susceptible     Ciprofloxacin >2 ug/ml Resistant     Doripenem <=0.5 ug/ml Susceptible     Ertapenem <=1 ug/ml Susceptible     Gentamicin <=4 ug/ml Susceptible     Levofloxacin >4 ug/ml Resistant     Nitrofurantoin <=32 ug/ml Susceptible     Piperacillin + Tazobactam <=16 ug/ml Susceptible     Tetracycline <=4 ug/ml Susceptible     Tigecycline <=1 ug/ml Susceptible     Tobramycin <=4 ug/ml Susceptible     Trimethoprim + Sulfamethoxazole <=2/38 ug/ml Susceptible                    Comprehensive Metabolic Panel [[id number]]  (Abnormal) Collected:  08/16/18 0511    Specimen:  Blood Updated:  08/16/18 0559     Glucose 119 (H) mg/dL      BUN 14 mg/dL      Creatinine 0.60 mg/dL      Sodium 132 (L) mmol/L      Potassium 2.7 (C) mmol/L      Chloride 92 (L) mmol/L      CO2 31.0 (H) mmol/L      Calcium 8.3 (L) mg/dL      Total Protein 5.7 (L) g/dL      Albumin 3.20 (L) g/dL      ALT (SGPT) 32 U/L      AST (SGOT) 44 U/L      Alkaline Phosphatase 84 U/L      Total Bilirubin 0.4 mg/dL      eGFR Non African Amer 93 mL/min/1.73      Globulin 2.5 gm/dL      A/G Ratio 1.3 g/dL      BUN/Creatinine Ratio 23.3     Anion Gap 11.7 mmol/L     Narrative:       The MDRD GFR formula is only valid for adults with stable renal function between ages 18 and 70.    Magnesium [[id number]]  (Abnormal) Collected:  08/16/18 0511    Specimen:  Blood Updated:  08/16/18 0557     Magnesium 1.4 (L) mg/dL     CBC & Differential [[id number]] Collected:  08/16/18 0511    Specimen:  Blood Updated:  08/16/18 0549    Narrative:       The following orders were created for panel order CBC & Differential.  Procedure                               Abnormality         Status                     ---------                               -----------         ------                     CBC Auto Differential[[id number]]         Abnormal            Final result                 Please view results for these tests on the individual orders.    CBC Auto Differential [[id number]]  (Abnormal) Collected:  08/16/18 0511    Specimen:  Blood Updated:  08/16/18 0549     WBC 10.48 10*3/mm3      RBC 4.00 (L) 10*6/mm3      Hemoglobin 12.4 g/dL      Hematocrit 37.8 %      MCV 94.5 fL      MCH 31.0 pg      MCHC 32.8 g/dL      RDW 12.3 %      RDW-SD 43.0 fl      MPV 10.0 fL      Platelets 327 10*3/mm3      Neutrophil % 79.9 %      Lymphocyte % 9.0 (L) %      Monocyte % 9.7 %      Eosinophil % 0.4 %      Basophil % 0.4 %      Immature Grans % 0.6 %      Neutrophils, Absolute 8.38 (H) 10*3/mm3      Lymphocytes, Absolute 0.94 10*3/mm3      Monocytes, Absolute 1.02 (H) 10*3/mm3      Eosinophils, Absolute 0.04 10*3/mm3      Basophils, Absolute 0.04 10*3/mm3      Immature Grans, Absolute 0.06 10*3/mm3      nRBC 0.0 /100 WBC           I have reviewed the patient's laboratory results.    Radiology results:    Imaging Results (last 24 hours)     ** No results found for the last 24 hours. **          I have reviewed the patient's radiology reports.    Medication Review:     I have reviewed the patients active and prn medications.     Assessment:  1. Acute subdural hematoma, post fall at assisted living facility, stable, nonoperative  2. Acute pyelonephritis, prior to admission, with ecoli  3. Acute kidney injury, improving  4Hypomagnesemia, treated  5Hypokalemia, treated  6Mildly elevated INR, improved post vitamin K x 1  7Advanced age  8Frequent falls and weakness  9History of B12 deficiency  10Dysphagia ruled out    Plan:  Follow INR, dailylabs and tirate medications. Anticipate further improvement and acute rehab in the next 1-2 days.     Patient declined  Any surgery. She is DNR.     Continue PT, OT.     Finish ceftriaxone total 7day        Martha Groves DO  08/16/18  8:26 PM

## 2018-08-17 NOTE — PLAN OF CARE
Problem: Patient Care Overview  Goal: Plan of Care Review  Outcome: Ongoing (interventions implemented as appropriate)   08/17/18 1571   Coping/Psychosocial   Plan of Care Reviewed With patient   Plan of Care Review   Progress improving   OTHER   Outcome Summary Pt presented with improved activity tolerance evident by pt able to ambulate into hallway however pt had several episodes of LOB and required assistance with weight shifting during gait. See flowsheet for details.

## 2018-08-17 NOTE — PLAN OF CARE
Problem: Patient Care Overview  Goal: Plan of Care Review  Outcome: Ongoing (interventions implemented as appropriate)   08/17/18 1341   Coping/Psychosocial   Plan of Care Reviewed With patient   Plan of Care Review   Progress improving   OTHER   Outcome Summary Pt participated in LB dressing tasks, needing min assist to complete. Pt noted with posterior lean, worked on forward flexion at bedside, applying lotion to her legs. Pt walked 136' with min assist using rollator

## 2018-08-17 NOTE — PROGRESS NOTES
[facility]    Name:  Deidre Bobby   Age:  89 y.o.  Sex:  female  :  1925  MRN:  [id number]   Visit Number:  [id number]  Admission Date:  2018  Date Of Service:  18  Primary Care Physician:  Nancy Morrison MD     LOS: 3 days :      Chief Complaint:  Follow up UTI and subdural hematoma        Subjective / Interval History:  The patient was seen multiple times today.  She was increasingly sleepy this morning and not eating and drinking.  Her sodium also dropped.  I suspect she is still not eating and drinking enough and I confirmed this later today with her granddaughter.  She is generally weak and has had acute functional decline.    She seems to have less confusion.  After she awoke later increased encouragement was required to get her to eat.  She was started on some short-term IV normal saline without improvement. She later had gotten too cold after fluids, with pain and numbness in her distal fingers, resolved with warmth and heat.      Review of Systems:     General ROS: Patient denies any fevers, chills or loss of consciousness.  Generalized weakness still and was sleepier this morning  Ophthalmic ROS: Denies any diplopia or transient loss of vision.  ENT ROS: Denies sore throat, nasal congestion or ear pain.   Respiratory ROS: Denies cough or shortness of breath.  Cardiovascular ROS: Denies chest pain or palpitations. No history of exertional chest pain.   Gastrointestinal ROS: Denies nausea and vomiting. Denies abdominal pain. No diarrhea.  Genito-Urinary ROS: Denies dysuria or hematuria.  Musculoskeletal ROS: Denies back pain. No muscle pain. No calf pain.  Neurological ROS: Denies any focal weakness. No loss of consciousness. Denies any numbness. Denies neck pain.   Dermatological ROS: Denies any redness or pruritis.    Vital Signs:    Temp:  [97.4 °F (36.3 °C)-99.8 °F (37.7 °C)] 98.1 °F (36.7 °C)  Heart Rate:  [] 97  Resp:   [16-18] 16  BP: (100-182)/(67-90) 122/82    Intake and output:    I/O last 3 completed shifts:  In: 1920 [P.O.:720; I.V.:1000; IV Piggyback:200]  Out: 1650 [Urine:1650]  I/O this shift:  In: -   Out: 1075 [Urine:1075]    Physical Examination:    General Appearance:   Lying in bed.  Awakens to voice.  Alert and cooperative.     Head:    Atraumatic and normocephalic, without obvious abnormality.   Eyes:            PERRLA, EOMI pallor. Extraocular movements are within normal limits.   Throat:   No oral lesions, no thrush, oral mucosa dry    Neck:   Supple, trachea midline, no thyromegaly   Lungs:     Clear to auscultation bilaterally.  No wheeze or rhonchi     Heart:    Normal S1 and S2, no murmur   Abdomen:     Normal bowel sounds, no masses, no organomegaly. Soft        non-tender, non-distended, no guarding, no rebound                tenderness   Extremities:   Moves all extremities well, no edema, no cyanosis, no             clubbing   Skin:   No bleeding, bruising or rash.  Stable diffuse lower leg ecchymoses with stable venous stasis ulcer right lower leg as prior to admission    Neurologic:   No tremor, sensation intact, diffuse arthropathy of all phalanges without color change.  Increased aches and pains in her fingers with movement    Laboratory results:    Lab Results (last 24 hours)     Procedure Component Value Units Date/Time    Blood Culture - Blood, [[id number]]  (Normal) Collected:  08/14/18 1623    Specimen:  Blood from Arm, Left Updated:  08/17/18 1745     Blood Culture No growth at 3 days    Blood Culture - Blood, [[id number]]  (Normal) Collected:  08/14/18 1626    Specimen:  Blood from Arm, Right Updated:  08/17/18 1645     Blood Culture No growth at 3 days    Basic Metabolic Panel [[id number]]  (Abnormal) Collected:  08/17/18 1259    Specimen:  Blood Updated:  08/17/18 1318     Glucose 170 (H) mg/dL      BUN 14 mg/dL      Creatinine 0.70 mg/dL      Sodium 129 (L) mmol/L      Potassium 3.7 mmol/L       Chloride 96 (L) mmol/L      CO2 23.0 (L) mmol/L      Calcium 8.1 (L) mg/dL      eGFR Non African Amer 78 mL/min/1.73      BUN/Creatinine Ratio 20.0     Anion Gap 13.7 mmol/L     Narrative:       The MDRD GFR formula is only valid for adults with stable renal function between ages 18 and 70.    Magnesium [[id number]]  (Normal) Collected:  08/17/18 0517    Specimen:  Blood Updated:  08/17/18 0602     Magnesium 1.9 mg/dL     Basic Metabolic Panel [[id number]]  (Abnormal) Collected:  08/17/18 0517    Specimen:  Blood Updated:  08/17/18 0602     Glucose 113 (H) mg/dL      BUN 16 mg/dL      Creatinine 0.70 mg/dL      Sodium 130 (L) mmol/L      Potassium 3.8 mmol/L      Chloride 96 (L) mmol/L      CO2 26.0 mmol/L      Calcium 8.6 mg/dL      eGFR Non African Amer 78 mL/min/1.73      BUN/Creatinine Ratio 22.9     Anion Gap 11.8 mmol/L     Narrative:       The MDRD GFR formula is only valid for adults with stable renal function between ages 18 and 70.    Protime-INR [[id number]]  (Abnormal) Collected:  08/17/18 0517    Specimen:  Blood Updated:  08/17/18 0559     Protime 12.7 (H) Seconds      INR 1.14 (H)    CBC & Differential [[id number]] Collected:  08/17/18 0517    Specimen:  Blood Updated:  08/17/18 0556    Narrative:       The following orders were created for panel order CBC & Differential.  Procedure                               Abnormality         Status                     ---------                               -----------         ------                     CBC Auto Differential[[id number]]        Abnormal            Final result                 Please view results for these tests on the individual orders.    CBC Auto Differential [[id number]]  (Abnormal) Collected:  08/17/18 0517    Specimen:  Blood Updated:  08/17/18 0556     WBC 10.28 10*3/mm3      RBC 4.24 10*6/mm3      Hemoglobin 13.2 g/dL      Hematocrit 40.2 %      MCV 94.8 fL      MCH 31.1 (H) pg      MCHC 32.8 g/dL      RDW 12.8 %      RDW-SD 44.1 fl      MPV  10.1 fL      Platelets 321 10*3/mm3      Neutrophil % 76.7 %      Lymphocyte % 11.3 %      Monocyte % 10.6 %      Eosinophil % 0.2 %      Basophil % 0.5 %      Immature Grans % 0.7 (H) %      Neutrophils, Absolute 7.89 (H) 10*3/mm3      Lymphocytes, Absolute 1.16 10*3/mm3      Monocytes, Absolute 1.09 (H) 10*3/mm3      Eosinophils, Absolute 0.02 10*3/mm3      Basophils, Absolute 0.05 10*3/mm3      Immature Grans, Absolute 0.07 (H) 10*3/mm3      nRBC 0.0 /100 WBC           I have reviewed the patient's laboratory results.    Radiology results:    Imaging Results (last 24 hours)     ** No results found for the last 24 hours. **          I have reviewed the patient's radiology reports.    Medication Review:     I have reviewed the patients active and prn medications.     Assessment:  1. Acute subdural hematoma, post fall at assisted living facility, stable, nonoperative  2. Acute pyelonephritis, prior to admission, with gram negative swathi  3. Acute kidney injury  4Hypomagnesemia  5Hypokalemia  6Mildly elevated INR, improved post vitamin K x 1  7Advanced age  8Frequent falls and weakness  9History of B12 deficiency  10Dysphagia ruled out  11 acute on chronic constipation    Plan:    Continue to monitor inpatient. Repeat Labs again tomorrow. Increased diet and low dose iv fluids with warmth tonight.     Salt tabs. 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     Restarted some other home medications, levothyroxine, paxil. Continue megace. This should help.     If sodium improving tomorrow, will discharge to nursing facility.     Suppository for bowel movement.  Medication risks and benefits were discussed in detail. Patient reported satisfaction with care delivered and treatment plan.     Martha Groves DO  08/17/18  5:51 PM

## 2018-08-17 NOTE — PROGRESS NOTES
Continued Stay Note  [facility]     Patient Name: Deidre Bobby  MRN: [id number]  Today's Date: 8/17/2018    Admit Date: 8/14/2018          Discharge Plan     Row Name 08/17/18 1242       Plan    Plan Comments Pt has been accepted to [facility] to call report to [phone] [place] fax DC Summary to [phone] She will transported  by EMS she is weak unable to sit in a car Pt and Grand daughter updated               Discharge Codes    No documentation.       Expected Discharge Date and Time     Expected Discharge Date Expected Discharge Time    Aug 17, 2018             Juana Yanes
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
## 2018-08-17 NOTE — THERAPY TREATMENT NOTE
Acute Care - Physical Therapy Treatment Note   Solo     Patient Name: Deidre Bobby  : 1925  MRN: [id number]  Today's Date: 2018  Onset of Illness/Injury or Date of Surgery: 18  Date of Referral to PT: 18  Referring Physician: Dr. Zuniga    Admit Date: 2018    Visit Dx:    ICD-10-CM ICD-9-CM   1. Acute UTI (urinary tract infection) N39.0 599.0   2. Subdural hematoma (CMS/HCC) I62.00 432.1   3. Impaired functional mobility, balance, gait, and endurance Z74.09 V49.89     Patient Active Problem List   Diagnosis   • Primary osteoarthritis involving multiple joints   • Impairment of balance   • Chronic diastolic congestive heart failure (CMS/HCC)   • Cardiac disease   • Hypercholesterolemia   • History of myocardial infarction   • Macular degeneration   • Hypothyroidism due to acquired atrophy of thyroid   • Cobalamin deficiency   • Chronic back pain   • Chronic constipation with overflow   • Oral phase dysphagia   • Subdural hematoma (CMS/HCC)   • Fall at home   • Pyelonephritis   • Acute renal failure (ARF) (CMS/HCC)       Therapy Treatment          Rehabilitation Treatment Summary     Row Name 18 1015 18 1005          Treatment Time/Intention    Discipline physical therapy assistant  -RM occupational therapist  -     Document Type therapy note (daily note)  - therapy note (daily note)  -     Subjective Information no complaints  -RM no complaints  -     Mode of Treatment physical therapy  - occupational therapy  -     Patient/Family Observations Pt with OT sitting EOB. No family present in room.   - Pt received supine in bed  -     Care Plan Review care plan/treatment goals reviewed  - care plan/treatment goals reviewed;patient/other agree to care plan  -     Patient Effort adequate  -RM good  -     Existing Precautions/Restrictions fall  -RM fall  -     Recorded by [] Luis Wick, MICHEL 18 1403 [] Joan Carter 18 1340      Row Name 08/17/18 1015             Safety Issues, Functional Mobility    Safety Issues Affecting Function (Mobility) safety precautions follow-through/compliance;positioning of assistive device  -      Impairments Affecting Function (Mobility) balance;endurance/activity tolerance;strength  -      Recorded by [] Luis Wick, [facility] 08/17/18 1403      Row Name 08/17/18 1005             Bed Mobility Assessment/Treatment    Supine-Sit Bledsoe (Bed Mobility) minimum assist (75% patient effort)  -      Bed Mobility, Safety Issues decreased use of arms for pushing/pulling;decreased use of legs for bridging/pushing;impaired trunk control for bed mobility  -      Assistive Device (Bed Mobility) head of bed elevated;bed rails  -      Recorded by [] Joan Carter 08/17/18 1340      Row Name 08/17/18 1005             Functional Mobility    Functional Mobility- Ind. Level minimum assist (75% patient effort)  -      Functional Mobility- Device rollator  -      Functional Mobility-Distance (Feet) 136  -      Functional Mobility- Safety Issues balance decreased during turns;weight-shifting ability decreased;loses balance backward  -      Recorded by [] Joan Carter 08/17/18 1340      Row Name 08/17/18 1015 08/17/18 1005          Sit-Stand Transfer    Sit-Stand Bledsoe (Transfers) minimum assist (75% patient effort);verbal cues  - minimum assist (75% patient effort)  -     Assistive Device (Sit-Stand Transfers) walker, front-wheeled  - walker, 4-wheeled  -     Recorded by [] Luis Wick, PTA 08/17/18 1403 [] Joan Carter 08/17/18 1340     Row Name 08/17/18 1015 08/17/18 1005          Stand-Sit Transfer    Stand-Sit Bledsoe (Transfers) minimum assist (75% patient effort);verbal cues  - minimum assist (75% patient effort)  -     Assistive Device (Stand-Sit Transfers) walker, front-wheeled  - walker, 4-wheeled  -     Recorded by [] Luis Wick, PTA  08/17/18 1403 [] Joan Carter 08/17/18 1340     Row Name 08/17/18 1015             Gait/Stairs Assessment/Training    Nahma Level (Gait) minimum assist (75% patient effort);verbal cues   tactile cues   -RM      Assistive Device (Gait) walker, 4-wheeled  -RM      Distance in Feet (Gait) 68   x2   -RM      Pattern (Gait) step-through  -RM      Deviations/Abnormal Patterns (Gait) base of support, narrow;stride length decreased  -RM      Bilateral Gait Deviations hip hiking;weight shift ability decreased   tactile cues for weight shifting   -RM      Comment (Gait/Stairs) Pt presented with several LOB during course of gait training.   -RM      Recorded by [] Luis Wick, [facility] 08/17/18 1403      Row Name 08/17/18 1005             Lower Body Dressing Assessment/Training    Lower Body Dressing Nahma Level don;doff;shoes/slippers;undergarment;minimum assist (75% patient effort)  -      Recorded by [] Joan Carter 08/17/18 1340      Row Name 08/17/18 1015             Motor Skills Assessment/Interventions    Additional Documentation Therapeutic Exercise (Group)  -RM      Recorded by [] Luis Wick, [facility] 08/17/18 1403      Row Name 08/17/18 1015             Therapeutic Exercise    Lower Extremity (Therapeutic Exercise) gluteal sets;gastroc stretch, bilateral;LAQ (long arc quad), bilateral;marching while seated  -RM      Exercise Type (Therapeutic Exercise) AROM (active range of motion);AAROM (active assistive range of motion)  -RM      Position (Therapeutic Exercise) seated  -RM      Sets/Reps (Therapeutic Exercise) 10  -RM      Expected Outcome (Therapeutic Exercise) improve performance, gait skills;improve performance, transfer skills  -RM      Recorded by [] Luis Wick, [facility] 08/17/18 1403      Row Name 08/17/18 1015             Positioning and Restraints    Pre-Treatment Position other (comment)   Sitting EOB   -RM      Post Treatment Position chair  -RM      In Chair  reclined;call light within reach;encouraged to call for assist;notified nsg  -RM      Recorded by [] Luis Wick, PTA 08/17/18 1403      Row Name 08/17/18 1015 08/17/18 1005          Pain Scale: Numbers Pre/Post-Treatment    Pain Scale: Numbers, Pretreatment 0/10 - no pain  -RM 0/10 - no pain  -     Pain Scale: Numbers, Post-Treatment 0/10 - no pain  -RM 0/10 - no pain  -     Recorded by [] Luis Wick, PTA 08/17/18 1403 [] Joan Carter 08/17/18 1340     Row Name                Wound 08/14/18 2030 Left lower;anterior  skin tear    Wound - Properties Group Date first assessed: 08/14/18 [CR] Time first assessed: 2030 [CR] Present On Admission : yes [CR] Side: Left [CR] Orientation: lower;anterior [CR] Type: skin tear [CR] Recorded by:  [CR] Denny Rainey LPN 08/15/18 0155    Row Name 08/17/18 1005             Coping    Observed Emotional State accepting;cooperative  -      Verbalized Emotional State acceptance  -      Recorded by [] Joan Carter 08/17/18 1340      Row Name 08/17/18 1005             Plan of Care Review    Plan of Care Reviewed With patient  -      Recorded by [] Joan Carter 08/17/18 1340      Row Name 08/17/18 1005             Outcome Summary/Treatment Plan (OT)    Daily Summary of Progress (OT) progress toward functional goals as expected  -      Plan for Continued Treatment (OT) Cont OT per POC  -      Anticipated Discharge Disposition (OT) inpatient rehabilitation facility  -      Recorded by [] Joan Carter 08/17/18 1340      Row Name 08/17/18 1015             Outcome Summary/Treatment Plan (PT)    Daily Summary of Progress (PT) progress toward functional goals is good  -      Plan for Continued Treatment (PT) Cont PT per POC.   -      Recorded by [] Luis Wick, PTA 08/17/18 1403        User Key  (r) = Recorded By, (t) = Taken By, (c) = Cosigned By    Initials Name Effective Dates Discipline     Joan Carter 03/07/18 -  OT    CR  Denny Rainey LPN 10/26/16 -  Nurse     Luis Wick, PTA 03/07/18 -  PT          Wound 08/14/18 2030 Left lower;anterior  skin tear (Active)   Dressing Appearance dry;intact;no drainage 8/17/2018  8:00 AM   Drainage Amount none 8/17/2018  8:00 AM             Physical Therapy Education     Title: PT OT SLP Therapies (Active)     Topic: Physical Therapy (Done)     Point: Mobility training (Done)    Learning Progress Summary     Learner Status Readiness Method Response Comment Documented by    Patient Done Acceptance E,TB,D VU,NR   08/17/18 1403     Done Acceptance E,TB VU Proper use of RW for safe transfers/ambulation.  08/16/18 1607     Done Acceptance E,TB VU Purpose of PT/POC.  08/15/18 1342          Point: Home exercise program (Done)    Learning Progress Summary     Learner Status Readiness Method Response Comment Documented by    Patient Done Acceptance E,TB,D VU,NR   08/17/18 1403     Done Acceptance E,TB VU Proper use of RW for safe transfers/ambulation.  08/16/18 1607     Done Acceptance E,TB VU Purpose of PT/POC.  08/15/18 1342          Point: Precautions (Done)    Learning Progress Summary     Learner Status Readiness Method Response Comment Documented by    Patient Done Acceptance E,TB VU Proper use of RW for safe transfers/ambulation.  08/16/18 1607     Done Acceptance E,TB VU Purpose of PT/POC.  08/15/18 1342                      User Key     Initials Effective Dates Name Provider Type Discipline     04/03/18 -  Pippa Norman, PT Physical Therapist PT     03/07/18 -  Luis Wick, PTA Physical Therapy Assistant PT                    PT Recommendation and Plan     Outcome Summary/Treatment Plan (PT)  Daily Summary of Progress (PT): progress toward functional goals is good  Plan for Continued Treatment (PT): Cont PT per POC.   Plan of Care Reviewed With: patient  Progress: improving  Outcome Summary: Pt presented with improved activity tolerance evident by pt able to  ambulate into hallway  however pt had several episodes of LOB and required assistance with weight shifting during gait. See flowsheet for details.           Outcome Measures     Row Name 08/17/18 1015 08/17/18 1005 08/16/18 1348       How much help from another person do you currently need...    Turning from your back to your side while in flat bed without using bedrails? 3  -RM  -- 3  -LM    Moving from lying on back to sitting on the side of a flat bed without bedrails? 3  -RM  -- 3  -LM    Moving to and from a bed to a chair (including a wheelchair)? 3  -RM  -- 3  -LM    Standing up from a chair using your arms (e.g., wheelchair, bedside chair)? 3  -RM  -- 3  -LM    Climbing 3-5 steps with a railing? 2  -RM  -- 1  -LM    To walk in hospital room? 3  -RM  -- 2  -LM    AM-PAC 6 Clicks Score 17  -RM  -- 15  -LM       How much help from another is currently needed...    Putting on and taking off regular lower body clothing?  -- 3  -AH  --    Bathing (including washing, rinsing, and drying)  -- 3  -AH  --    Toileting (which includes using toilet bed pan or urinal)  -- 3  -AH  --    Putting on and taking off regular upper body clothing  -- 3  -AH  --    Taking care of personal grooming (such as brushing teeth)  -- 4  -AH  --    Eating meals  -- 4  -AH  --    Score  -- 20  -AH  --       Functional Assessment    Outcome Measure Options AM-PAC 6 Clicks Basic Mobility (PT)  -RM AM-PAC 6 Clicks Daily Activity (OT)  - AM-PAC 6 Clicks Basic Mobility (PT)  -LM    Row Name 08/15/18 1056 08/15/18 1055          How much help from another person do you currently need...    Turning from your back to your side while in flat bed without using bedrails?  -- 3  -LM     Moving from lying on back to sitting on the side of a flat bed without bedrails?  -- 3  -LM     Moving to and from a bed to a chair (including a wheelchair)?  -- 3  -LM     Standing up from a chair using your arms (e.g., wheelchair, bedside chair)?  -- 3  -LM      Climbing 3-5 steps with a railing?  -- 3  -LM     To walk in hospital room?  -- 3  -LM     AM-PAC 6 Clicks Score  -- 18  -LM        How much help from another is currently needed...    Putting on and taking off regular lower body clothing? 3  -AH  --     Bathing (including washing, rinsing, and drying) 3  -AH  --     Toileting (which includes using toilet bed pan or urinal) 3  -AH  --     Putting on and taking off regular upper body clothing 4  -AH  --     Taking care of personal grooming (such as brushing teeth) 4  -AH  --     Eating meals 4  -AH  --     Score 21  -AH  --        Functional Assessment    Outcome Measure Options AM-PAC 6 Clicks Daily Activity (OT)  - AM-[facility] 6 Clicks Basic Mobility (PT)  -       User Key  (r) = Recorded By, (t) = Taken By, (c) = Cosigned By    Initials Name Provider Type     Joan Carter Occupational Therapist    LM Pippa Norman, PT Physical Therapist    Luis Wheat, MICHEL Physical Therapy Assistant           Time Calculation:         PT Charges     Row Name 08/17/18 1406             Time Calculation    Start Time 1015  -RM      PT Received On 08/17/18  -RM      PT Goal Re-Cert Due Date 08/25/18  -RM         Time Calculation- PT    Total Timed Code Minutes- PT 25 minute(s)  -RM         Timed Charges    61370 - PT Therapeutic Exercise Minutes 10  -RM      07570 - Gait Training Minutes  15  -RM        User Key  (r) = Recorded By, (t) = Taken By, (c) = Cosigned By    Initials Name Provider Type    Luis Wheat, MICHEL Physical Therapy Assistant        Therapy Suggested Charges     Code   Minutes Charges    08991 (CPT®)  Pt Neuromusc Re Education Ea 15 Min      66063 (CPT®) Hc Pt Ther Proc Ea 15 Min 10 1    99165 (CPT®) Hc Gait Training Ea 15 Min 15 1    07553 (CPT®) Hc Pt Therapeutic Act Ea 15 Min      59374 (CPT®) Hc Pt Manual Therapy Ea 15 Min      01026 (CPT®) Hc Pt Iontophoresis Ea 15 Min      32619 (CPT®) Hc Pt Elec Stim Ea-Per 15 Min      34700 (CPT®) Hc Pt  Ultrasound Ea 15 Min      57558 (CPT®) Hc Pt Self Care/Mgmt/Train Ea 15 Min      27307 (CPT®) Hc Pt Prosthetic (S) Train Initial Encounter, Each 15 Min      98614 (CPT®) Hc Pt Orthotic(S)/Prosthetic(S) Encounter, Each 15 Min      50389 (CPT®) Hc Orthotic(S) Mgmt/Train Initial Encounter, Each 15min      Total  25 2        Therapy Charges for Today     Code Description Service Date Service Provider Modifiers Qty    21623295700 HC PT THER PROC EA 15 MIN 8/17/2018 Luis Wick, PTA GP 1    57951330557 HC GAIT TRAINING EA 15 MIN 8/17/2018 Luis Wick, PTA GP 1          PT G-Codes  Outcome Measure Options: AM-PAC 6 Clicks Basic Mobility (PT)    Luis Wick, MICHEL  8/17/2018

## 2018-08-18 NOTE — PROGRESS NOTES
[facility]    Name:  Deidre Bobby   Age:  89 y.o.  Sex:  female  :  1925  MRN:  [id number]   Visit Number:  [id number]  Admission Date:  2018  Date Of Service:  18  Primary Care Physician:  Nancy Morrison MD     LOS: 4 days :      Chief Complaint:  Follow up UTI, renal failure, subdural hematoma        Subjective / Interval History:  The patient has continued to hardly eat or drink.  She did have a bowel movement.  She had worsening left shoulder pain with a skin rash that broke out overnight.  She does have shingles now.  Her so has dropped further.  She is very generally weak.  She denies chest pain, shortness of breath, abdominal pain.    Review of Systems:     General ROS: Patient denies any fevers, chills or loss of consciousness.  Still generally weak and sleepy  Ophthalmic ROS: Denies any diplopia or transient loss of vision.  ENT ROS: Denies sore throat, nasal congestion or ear pain.   Respiratory ROS: Denies cough or shortness of breath.  Cardiovascular ROS: Denies chest pain or palpitations. No history of exertional chest pain.   Gastrointestinal ROS: Denies nausea and vomiting. Denies abdominal pain. No diarrhea.  Genito-Urinary ROS: Denies dysuria or hematuria.  Musculoskeletal ROS: Positive left shoulder pain at area of rash. No muscle pain. No calf pain.  Neurological ROS: Denies any focal weakness. No loss of consciousness. Denies any numbness. Denies neck pain.   Dermatological ROS: Positive left back and shoulder and arm rash painful    Vital Signs:    Temp:  [98 °F (36.7 °C)-99.3 °F (37.4 °C)] 98 °F (36.7 °C)  Heart Rate:  [] 89  Resp:  [16-17] 16  BP: (109-176)/(63-94) 109/63    Intake and output:    I/O last 3 completed shifts:  In: -   Out: 2475 [Urine:2475]  I/O this shift:  In: 480 [P.O.:480]  Out: 200 [Urine:200]    Physical Examination:    General Appearance:   Chronically ill elderly lady lying in bed.  Awake.   Not very talkative.  Sleeping often.  No acute distress    Head:    Atraumatic.  Normocephalic.     Eyes:            EOMI.  No icterus    Throat:   Dry without lesion    Neck:   Supple   Lungs:     Diminished but clear bilaterally.  No wheezing or rhonchi     Heart:    Normal S1 and S2, no murmur   Abdomen:    Soft, nontender, nondistended, positive bowel sounds    Extremities:   Moves all extremities some, no edema, no cyanosis, no             clubbing   Skin:   Stable diffuse bilateral leg ecchymoses and ulcer, no bleeding; upper thoracic vesicular rash extending laterally across the shoulder and down the left arm approximately T2 region   Neurologic:   No tremor, sensation intact, diffuse arthropathy of all phalanges without color change.  Increased aches and pains in her fingers with movement    Laboratory results:    Lab Results (last 24 hours)     Procedure Component Value Units Date/Time    Comprehensive Metabolic Panel [[id number]] Collected:  08/18/18 1338    Specimen:  Blood Updated:  08/18/18 1340    Basic Metabolic Panel [[id number]]  (Abnormal) Collected:  08/18/18 0546    Specimen:  Blood Updated:  08/18/18 0633     Glucose 96 mg/dL      BUN 11 mg/dL      Creatinine 0.60 mg/dL      Sodium 126 (C) mmol/L      Potassium 3.8 mmol/L      Chloride 93 (L) mmol/L      CO2 25.0 (L) mmol/L      Calcium 8.2 (L) mg/dL      eGFR Non African Amer 93 mL/min/1.73      BUN/Creatinine Ratio 18.3     Anion Gap 11.8 mmol/L     Narrative:       The MDRD GFR formula is only valid for adults with stable renal function between ages 18 and 70.    Magnesium [[id number]]  (Abnormal) Collected:  08/18/18 0546    Specimen:  Blood Updated:  08/18/18 0627     Magnesium 1.5 (L) mg/dL     CBC (No Diff) [[id number]]  (Abnormal) Collected:  08/18/18 0546    Specimen:  Blood Updated:  08/18/18 0603     WBC 11.90 (H) 10*3/mm3      RBC 4.02 (L) 10*6/mm3      Hemoglobin 12.5 g/dL      Hematocrit 37.7 %      MCV 93.8 fL      MCH 31.1 (H) pg       MCHC 33.2 g/dL      RDW 12.4 %      RDW-SD 43.1 fl      MPV 9.8 fL      Platelets 314 10*3/mm3     Blood Culture - Blood, [[id number]]  (Normal) Collected:  08/14/18 1623    Specimen:  Blood from Arm, Left Updated:  08/17/18 1745     Blood Culture No growth at 3 days    Blood Culture - Blood, [[id number]]  (Normal) Collected:  08/14/18 1626    Specimen:  Blood from Arm, Right Updated:  08/17/18 1645     Blood Culture No growth at 3 days          I have reviewed the patient's laboratory results.    Radiology results:    Imaging Results (last 24 hours)     Procedure Component Value Units Date/Time    CT Head Without Contrast [[id number]] Collected:  08/18/18 0908     Updated:  08/18/18 0912    Narrative:       PROCEDURE: CT HEAD WO CONTRAST-     HISTORY: Sub-dural hemorrhage     COMPARISON:  None .     TECHNIQUE: Multiple axial CT images were performed from the foramen  magnum to the vertex without enhancement.      FINDINGS: The previously described left parafalcine subdural hemorrhage  is much less conspicuous on today's exam and is not readily appreciable.  Cerebral atrophy is noted. Remote injury versus prominent perivascular  space in the left basal ganglia. Atherosclerosis is present. There is no  mass, mass effect or midline shift.  There is no hydrocephalus. The  paranasal sinuses are clear. Bone windows reveal no acute osseous  abnormalities.       Impression:       The previously described left parafalcine subdural  hemorrhage is much less conspicuous on today's exam and is not readily  appreciable.     Continued follow-up recommended             742.64 mGy.cm          This study was performed with techniques to keep radiation doses as low  as reasonably achievable (ALARA). Individualized dose reduction  techniques using automated exposure control or adjustment of mA and/or  kV according to the patient size were employed.      This report was finalized on 8/18/2018 9:10 AM by Tavo Landry DO.          I have  reviewed the patient's radiology reports.    Medication Review:     I have reviewed the patients active and prn medications.     Assessment:  1. Acute subdural hematoma, post fall at assisted living facility, stable, nonoperative  2. Acute pyelonephritis, prior to admission, with Ecoli  3. Acute kidney injury, improved  4. Hypomagnesemia, treated  5. Hypokalemia, treated  6 Mildly elevated INR, improved post vitamin K x 1  7. Advanced age  8. Frequent falls and weakness  9. History of B12 deficiency  10. Dysphagia ruled out  11 . acute on chronic constipation, resolved  12. Shingles, new onset, treatment initiated, left upper back and arm    Plan:  Provide more normal saline and see how she does. Repeat labs tomorrow. Acyclovir added and lortab as needed for pain ordered for Shingles. Cetin for UTI.   Discussed with granddaughter. Long term prognosis poor but short term will try to improve her infections and fluid status.Continue electrolyte replacement. Continue home medications with added Megace. Salt supplements. Anticipate discharge to acute rehab in the next 1-2 days.    Medication risks and benefits were discussed in detail. Patient and granddaughter reported satisfaction with care delivered and treatment plan.     Martha Groves,   08/18/18  1:58 PM

## 2018-08-19 NOTE — SIGNIFICANT NOTE
08/19/18 1457   PT Deferred Reason   PT Deferred Reason Routine  (PTA attempted x2 with staff chenging pt's lines and performing hygiene care x2 and pt recieved fluid medication this AM.. PT to f/u with pt tomorrow)

## 2018-08-19 NOTE — PLAN OF CARE
Problem: Patient Care Overview  Goal: Plan of Care Review  Outcome: Ongoing (interventions implemented as appropriate)   08/18/18 2100   Coping/Psychosocial   Plan of Care Reviewed With patient   Plan of Care Review   Progress improving   OTHER   Outcome Summary pleasant patient with IV fluids infusing-no complaints of pain-medications given per physician's orders-bed alarm on at all times-monitor labs and continue to monitor patient     Goal: Individualization and Mutuality  Outcome: Ongoing (interventions implemented as appropriate)    Goal: Discharge Needs Assessment  Outcome: Ongoing (interventions implemented as appropriate)    Goal: Interprofessional Rounds/Family Conf  Outcome: Ongoing (interventions implemented as appropriate)      Problem: Fall Risk (Adult)  Goal: Absence of Fall  Outcome: Ongoing (interventions implemented as appropriate)      Problem: Skin Injury Risk (Adult)  Goal: Skin Health and Integrity  Outcome: Ongoing (interventions implemented as appropriate)      Problem: Urinary Tract Infection (Adult)  Goal: Signs and Symptoms of Listed Potential Problems Will be Absent, Minimized or Managed (Urinary Tract Infection)  Outcome: Ongoing (interventions implemented as appropriate)

## 2018-08-19 NOTE — PROGRESS NOTES
[facility] HOSPITALIST    PROGRESS NOTE    Name:  Deidre Bobby   Age:  89 y.o.  Sex:  female  :  1925  MRN:  [id number]   Visit Number:  [id number]  Admission Date:  2018  Date Of Service:  18  Primary Care Physician:  Nancy Morrison MD     LOS: 5 days :      Chief Complaint:  Follow up subdural hematoma, weakness, uti, hyponatremia        Subjective / Interval History:the patient is a 89-year-old lady withfrequent fall, who presented to the emergency room post fall at home.  She hit her head and was noted to have a subdural hematoma.  The patient is of advanced age so she does not want any surgery.  Prior to admission, neurosurgeon was found though and no surgery was indicated.  She was admitted to the hospitalist service for further treatment.  Urinary tract infection was identified and treated as well.  The patient's repeat CT scans of her head have shown improvement.  Despite treatment of urinary tract infection, the patient continues to eat very little.  She does not eat or drink well.  Megace was started.  She had persistent worsening of her hyponatremia.was started on IV fluids.  After fluids, the patient's hyponatremia worsened and she had signs of acute fluid overload with increased cough and chest congestion. She has continued to have some intermittent confusion. A chest x-ray today confirms some mild diffuse interstitial changes.  A 2-D echo is pending for tomorrow.  With CHF and failure of other IV fluids,as well as persistent confusion, I did give the okay to finally try 3% normal saline with close monitoring.  The patient has also continued to require magnesium replacement and potassium replacement on a daily basis with reduced oral intake. Unfortunately, she also had neck pain found to have acute shingles during her stay as well, treated with acyclovir.     She has had acute functional decline recently, with fall and UTI and shingles. Her granddaughter  remains very active in her care and wants to try further adjustments in her medications, care if possible. If the patient continues to NOT improve, I did recommend she consider palliative care instead. Placement is ready if patient improves, held this weekend due to worsening clinical status.    The patient was seen again today.  She was confused again.  She is not eating or drinking much.  She denies chest pain, abdominal pain, nausea, vomiting.  History is difficult to obtain secondary to her confusion.  She did admit to some cough with some shortness of breath at rest.  She does not have edema. Her blood pressure was elevated and her heart rate elevated today.        Review of Systems:     General ROS: Patient denies any fevers, chills or loss of consciousness.confused and generally weak  Ophthalmic ROS: Denies any diplopia or transient loss of vision.  ENT ROS: Denies sore throat, nasal congestion or ear pain.   Respiratory ROS: Denies cough or shortness of breath.  Cardiovascular ROS: Denies chest pain or palpitations. No history of exertional chest pain.   Gastrointestinal ROS: Denies nausea and vomiting. Denies abdominal pain. No diarrhea.  Genito-Urinary ROS: Denies dysuria or hematuria.  Musculoskeletal ROS: Denies pain with stable rash. No muscle pain. No calf pain.  Neurological ROS: Denies any focal weakness. No loss of consciousness. Denies any numbness. Denies neck pain.   Dermatological ROS: Improving left shoulder rash from shingles    Vital Signs:    Temp:  [97.7 °F (36.5 °C)-98.2 °F (36.8 °C)] 98.1 °F (36.7 °C)  Heart Rate:  [72-97] 72  Resp:  [16-18] 18  BP: (144-180)/(68-96) 165/96    Intake and output:    I/O last 3 completed shifts:  In: 2753.3 [P.O.:720; I.V.:2033.3]  Out: 3625 [Urine:3625]  I/O this shift:  In: -   Out: 250 [Urine:250]    Physical Examination:    General Appearance:   confused, sitting up in bed awake and mildly agitated.  Improved with reassurance.  Small cough   Head:     Atraumatic.  Normocephalic.     Eyes:            EOMI.  No icterus    Throat:   Dry without lesion. No thrush   Neck:   Supple   Lungs:     Increased diffuse crackles today. No wheeze or rhonchi.    Heart:    Normal S1 and S2, no murmur   Abdomen:    Soft, nontender, nondistended, positive bowel sounds    Extremities:   Moves all extremities some, no edema, no cyanosis, no             clubbing   Skin:   Stable herpetic lesions left arm/shoulder/back. No acute drainage. STable leg ulcer.   Neurologic:   Diffuse hand arthropathy. Severe muscle atrophy legs and arms. No tremor.     Laboratory results:    Lab Results (last 24 hours)     Procedure Component Value Units Date/Time    Basic Metabolic Panel [[id number]]  (Abnormal) Collected:  08/19/18 1438    Specimen:  Blood Updated:  08/19/18 1500     Glucose 87 mg/dL      BUN 12 mg/dL      Creatinine 0.70 mg/dL      Sodium 126 (C) mmol/L      Potassium 3.9 mmol/L      Chloride 93 (L) mmol/L      CO2 22.0 (L) mmol/L      Calcium 8.2 (L) mg/dL      eGFR Non African Amer 78 mL/min/1.73      BUN/Creatinine Ratio 17.1     Anion Gap 14.9 mmol/L     Narrative:       The MDRD GFR formula is only valid for adults with stable renal function between ages 18 and 70.    Comprehensive Metabolic Panel [[id number]]  (Abnormal) Collected:  08/19/18 0806    Specimen:  Blood Updated:  08/19/18 0829     Glucose 95 mg/dL      BUN 10 mg/dL      Creatinine 0.60 mg/dL      Sodium 125 (C) mmol/L      Potassium 3.8 mmol/L      Chloride 95 (L) mmol/L      CO2 24.0 (L) mmol/L      Calcium 8.2 (L) mg/dL      Total Protein 5.4 (L) g/dL      Albumin 3.00 (L) g/dL      ALT (SGPT) 28 U/L      AST (SGOT) 30 U/L      Alkaline Phosphatase 70 U/L      Total Bilirubin 0.5 mg/dL      eGFR Non African Amer 93 mL/min/1.73      Globulin 2.4 gm/dL      A/G Ratio 1.3 g/dL      BUN/Creatinine Ratio 16.7     Anion Gap 9.8 (L) mmol/L     Narrative:       The MDRD GFR formula is only valid for adults with stable renal  function between ages 18 and 70.    Magnesium [[id number]]  (Abnormal) Collected:  08/19/18 0806    Specimen:  Blood Updated:  08/19/18 0826     Magnesium 1.5 (L) mg/dL     CBC & Differential [[id number]] Collected:  08/19/18 0806    Specimen:  Blood Updated:  08/19/18 0817    Narrative:       The following orders were created for panel order CBC & Differential.  Procedure                               Abnormality         Status                     ---------                               -----------         ------                     CBC Auto Differential[[id number]]        Abnormal            Final result                 Please view results for these tests on the individual orders.    CBC Auto Differential [[id number]]  (Abnormal) Collected:  08/19/18 0806    Specimen:  Blood Updated:  08/19/18 0817     WBC 11.68 (H) 10*3/mm3      RBC 3.76 (L) 10*6/mm3      Hemoglobin 11.6 (L) g/dL      Hematocrit 34.4 (L) %      MCV 91.5 fL      MCH 30.9 pg      MCHC 33.7 g/dL      RDW 12.3 %      RDW-SD 41.1 fl      MPV 10.2 fL      Platelets 320 10*3/mm3      Neutrophil % 77.5 %      Lymphocyte % 12.4 %      Monocyte % 8.6 %      Eosinophil % 0.2 %      Basophil % 0.4 %      Immature Grans % 0.9 (H) %      Neutrophils, Absolute 9.06 (H) 10*3/mm3      Lymphocytes, Absolute 1.45 10*3/mm3      Monocytes, Absolute 1.00 (H) 10*3/mm3      Eosinophils, Absolute 0.02 10*3/mm3      Basophils, Absolute 0.05 10*3/mm3      Immature Grans, Absolute 0.10 (H) 10*3/mm3      nRBC 0.0 /100 WBC     Blood Culture - Blood, [[id number]]  (Normal) Collected:  08/14/18 1623    Specimen:  Blood from Arm, Left Updated:  08/18/18 1745     Blood Culture No growth at 4 days    Blood Culture - Blood, [[id number]]  (Normal) Collected:  08/14/18 1626    Specimen:  Blood from Arm, Right Updated:  08/18/18 1645     Blood Culture No growth at 4 days          I have reviewed the patient's laboratory results.    Radiology results:    Imaging Results (last 24 hours)      Procedure Component Value Units Date/Time    XR Chest 1 View [[id number]] Updated:  08/19/18 0990          I have reviewed the patient's radiology reports.    Medication Review:     I have reviewed the patients active and prn medications.     Assessment:  1. Acute subdural hematoma, post fall at assisted living facility, stable, nonoperative, improved  2. Acute pyelonephritis, prior to admission, with Ecoli, treated  3. Acute kidney injury, improved  4. Hypomagnesemia, treated  5. Hypokalemia, treated  6 Mildly elevated INR, improved post vitamin K x 1  7. Advanced age  8. Frequent falls and weakness  9. History of B12 deficiency  10. Dysphagia ruled out  11 . acute on chronic constipation, resolved  12. Shingles, new onset, treatment initiated, left upper back and arm  13. Acute CHF, unable to specify post fluid resuscitation    Plan:  With Acute CHF, she was started on iv lasix, metoprolol. Chest xray reviewed by me with interstitial changes. Provide oxygen and nebs if needed. Continue to monitor on telemetry.  With CHF with normal saline, I have stopped iv fluids and replaced with 3% normal saline with repeat bmp q6h x 12 hours. Sodium improved one point so far in four hours. Discussed with nursing. Encourage oral feeds and fluids. Magnesium increased. Potassium replaced. If she doesn't improve after this, would consider palliative care.     Continue Ceftin, acyclovir with daily wound care to shingles patch. Pain control as needed.     Continue fall precautions, PT and OT. Close monitoring with intermittent confusion,  But she is not requiring sedation medication. Anticipate improvement in confusion with correction of sodium.     Medication risks and benefits were discussed in detail. Patient and granddaughter reported satisfaction with care delivered and treatment plan.     Martha Groves DO  08/19/18  3:33 PM
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
## 2018-08-20 NOTE — PROGRESS NOTES
"Hospitalist Progress Note.    LOS: 6 days    Patient Care Team:  Nancy Morrison MD as PCP - General  Nancy Morrison MD as PCP - Family Medicine    Chief Complaint:    F/u AMS, F/u SDH, F/u hyponatremia    Subjective   Patient seen and examined this morning.  She is drowsy, does not answer any questions, only mumbles words. Her granddaughter at the bedside and nursing provides most of the history. Apparently, patient was getting ready for discharge, but her sodium levels dropped and did not improve despite hydration, required 3% saline with some improvement. Repeat CT head done on 08/18 shows improvement in the subdural hematoma. Patient's granddaughter upset over the fact that patient is not able to eat on her own and will need assistance with feeds, this was relayed to nursing. She also now has shingles for which she is being treated with acyclovir.     Review of Systems:    Cannot be obtained due to patient's inability to hold a conversation due to poor mentation.     Objective     Vital Signs  /79 (BP Location: Left arm, Patient Position: Lying)   Pulse 64   Temp 98.4 °F (36.9 °C) (Oral)   Resp 16   Ht 165.1 cm (65\")   Wt 47.7 kg (105 lb 3.2 oz)   SpO2 96%   BMI 17.51 kg/m²       I/O this shift:  In: 220 [P.O.:220]  Out: -     Intake/Output Summary (Last 24 hours) at 08/20/18 1507  Last data filed at 08/20/18 1339   Gross per 24 hour   Intake              340 ml   Output                0 ml   Net              340 ml       Physical Exam:    General Appearance: drowsy but arousable, no acute distress, frail, chronically ill appearing  HEENT: pupils round and reactive to light, oral mucosa dry, extraocular movements intact.  Neck: supple, no JVD, trachea midline  Lungs: Clear to Auscultation, unlabored breathing effort  Heart: RRR, normal S1 and S2, no S3, no rub  Abdomen: soft, non-tender, no palpable bladder, present bowel sounds to auscultation  Extremities: no edema, cyanosis or clubbing. " abrasions and skin tears, open wound on the plantar surface of the right foot with mild surrounding erythema and edema   Neuro: normal speech and mental status, grossly non focal.     Results Review:      Results from last 7 days  Lab Units 08/20/18  1200 08/20/18  0357 08/19/18  2143  08/19/18  0806 08/18/18  1338  08/16/18  0511   SODIUM mmol/L 127* 129* 127*  < > 125* 126*  < > 132*   POTASSIUM mmol/L 4.3 4.0 3.8  < > 3.8 4.1  < > 2.7*   CHLORIDE mmol/L 96* 97* 94*  < > 95* 96*  < > 92*   CO2 mmol/L 23.0* 23.0* 23.0*  < > 24.0* 23.0*  < > 31.0*   BUN mg/dL 15 16 17  < > 10 13  < > 14   CREATININE mg/dL 0.60 0.70 0.70  < > 0.60 0.80  < > 0.60   CALCIUM mg/dL 8.5 8.3* 8.1*  < > 8.2* 8.0*  < > 8.3*   BILIRUBIN mg/dL  --   --   --   --  0.5 0.2  --  0.4   ALK PHOS U/L  --   --   --   --  70 66  --  84   ALT (SGPT) U/L  --   --   --   --  28 27  --  32   AST (SGOT) U/L  --   --   --   --  30 30  --  44   GLUCOSE mg/dL 107* 104* 95  < > 95 159*  < > 119*   < > = values in this interval not displayed.    Estimated Creatinine Clearance: 33.1 mL/min (by C-G formula based on SCr of 0.6 mg/dL).      Results from last 7 days  Lab Units 08/19/18  0806 08/18/18  0546 08/17/18  0517  08/15/18  0637   MAGNESIUM mg/dL 1.5* 1.5* 1.9  < >  --    PHOSPHORUS mg/dL  --   --   --   --  2.5   < > = values in this interval not displayed.            Results from last 7 days  Lab Units 08/19/18  0806 08/18/18  0546 08/17/18  0517 08/16/18  0511 08/15/18  0637   WBC 10*3/mm3 11.68* 11.90* 10.28 10.48 11.62*   HEMOGLOBIN g/dL 11.6* 12.5 13.2 12.4 13.0   PLATELETS 10*3/mm3 320 314 321 327 316         Results from last 7 days  Lab Units 08/17/18  0517 08/15/18  0816 08/14/18  1910   INR  1.14* 1.22* 1.27*         Imaging Results (last 24 hours)     Procedure Component Value Units Date/Time    XR Chest 1 View [[id number]] Collected:  08/19/18 1601     Updated:  08/20/18 1458    Narrative:       FINAL REPORT    TECHNIQUE:  Single view  chest    CLINICAL HISTORY:  cough and congestion, possible fluid overload or pneumonia    FINDINGS:  A single view of the chest was obtained and compared to prior  exam from August 14, 2018 .  The heart size is stable.  The  mediastinum is unremarkable.  There is a left lower lobe opacity  likely secondary to pneumonia.  There is no pneumothorax.  Osseous structures are unremarkable.      Impression:       Left lower lobe opacity likely secondary to pneumonia.    Authenticated by Tavo Landry on 08/19/2018 04:01:18 PM    CT Head Without Contrast [[id number]] Collected:  08/14/18 1717     Updated:  08/20/18 1453    Narrative:       FINAL REPORT    TECHNIQUE:  Multiple contiguous transaxial slices through the head were  obtained without the intravenous administration of contrast.    CLINICAL HISTORY:  Altered mental status    FINDINGS:  There is an acute high density subdural hematoma along the falx  posteriorly measuring 5 mm in greatest dimension.  There is no  mass effect or shift.  There is age-appropriate cortical atrophy  with associated ventricular enlargement. Patchy periventricular  white matter low attenuation is most consistent with chronic  microvascular ischemia. There is no acute infarct or mass  effect. If concern persists, recommend MRI. There is no sinus  air-fluid level. The calvarium is intact.      Impression:       Subdural hematoma along the falx.  Recommend follow-up CT in  12-24 hours  Atrophy with chronic microvascular ischemia    Authenticated by Katy Wong MD on 08/14/2018 05:17:55 PM          acyclovir 400 mg Oral TID   famotidine 20 mg Oral Daily   lactobacillus acidophilus 1 capsule Oral TID   levothyroxine 112 mcg Oral Q AM   losartan 25 mg Oral Q24H   magnesium oxide 400 mg Oral TID   megestrol acetate 400 mg Oral BID   metoprolol tartrate 12.5 mg Oral Q12H   multivitamin with minerals 1 tablet Oral Daily   mupirocin  Topical Q12H   pantoprazole 40 mg Oral Q AM   PARoxetine 20 mg  Oral Daily   potassium chloride 20 mEq Oral Daily          Medication Review:   Current Facility-Administered Medications   Medication Dose Route Frequency Provider Last Rate Last Dose   • acetaminophen (TYLENOL) tablet 650 mg  650 mg Oral Q4H PRN Ayaka Zuniga MD       • acyclovir (ZOVIRAX) suspension 400 mg  400 mg Oral TID Martha Groves DO   400 mg at 08/20/18 0950   • bisacodyl (DULCOLAX) suppository 10 mg  10 mg Rectal Daily PRN Martha Groves DO   10 mg at 08/17/18 1043   • CHAPSTICK 1 each  1 each Apply externally PRN Martha Groves DO       • docusate sodium (COLACE) capsule 100 mg  100 mg Oral Daily PRN Ayaka Zuniga MD   100 mg at 08/19/18 0954   • famotidine (PEPCID) tablet 20 mg  20 mg Oral Daily Ayaka Zuniga MD   20 mg at 08/20/18 0950   • hydrALAZINE (APRESOLINE) injection 20 mg  20 mg Intravenous Q6H PRN Martha Groves DO   20 mg at 08/16/18 1931   • HYDROcodone-acetaminophen (NORCO) 5-325 MG per tablet 1 tablet  1 tablet Oral Q8H PRN Ayaka Zuniga MD   1 tablet at 08/19/18 2040   • ipratropium-albuterol (DUO-NEB) nebulizer solution 3 mL  3 mL Nebulization Q4H PRN Martha Groves DO       • lactobacillus acidophilus (RISAQUAD) capsule 1 capsule  1 capsule Oral TID Martha Groves DO   1 capsule at 08/20/18 0950   • levothyroxine (SYNTHROID, LEVOTHROID) tablet 112 mcg  112 mcg Oral Q AM Martha Groves DO   112 mcg at 08/20/18 0700   • lidocaine (XYLOCAINE) 2 % jelly   Topical PRN Martha Groves DO       • losartan (COZAAR) tablet 25 mg  25 mg Oral Q24H Martha Groves DO   25 mg at 08/20/18 0950   • magnesium oxide (MAGOX) tablet 400 mg  400 mg Oral TID Martha Groves DO   400 mg at 08/20/18 0950   • megestrol acetate (MEGACE) oral suspension 400 mg  400 mg Oral BID Martha Groves DO   400 mg at 08/20/18 0949   • metoprolol tartrate (LOPRESSOR) tablet 12.5 mg  12.5 mg Oral Q12H Martha Groves DO   12.5 mg at 08/20/18 0950   • multivitamin with minerals  1 tablet  1 tablet Oral Daily Martha Groves, DO   1 tablet at 08/20/18 0950   • mupirocin (BACTROBAN) 2 % cream   Topical Q12H Ayaka Zuniga MD       • ondansetron (ZOFRAN) injection 4 mg  4 mg Intravenous Q6H PRN Ayaka Zuniga MD       • pantoprazole (PROTONIX) EC tablet 40 mg  40 mg Oral Q AM Martha Groves, DO   40 mg at 08/20/18 0700   • PARoxetine (PAXIL) tablet 20 mg  20 mg Oral Daily Martha Groves, DO   20 mg at 08/20/18 0949   • potassium chloride (KLOR-CON) packet 20 mEq  20 mEq Oral Daily Martha Groves, DO   20 mEq at 08/20/18 0950   • sodium chloride 0.9 % flush 1-10 mL  1-10 mL Intravenous PRN Ayaka Zuniga MD       • sodium chloride 0.9 % flush 10 mL  10 mL Intravenous PRN Reno Saini PA-C         Principal Problem:    Subdural hematoma (CMS/[facility])  Active Problems:    Fall at home    Pyelonephritis    Acute renal failure (ARF) (CMS/[facility])    Assessment/Plan   1. Acute subdural hematoma, post fall at assisted living facility, stable, nonoperative, improved  2. Acute pyelonephritis, prior to admission, with Ecoli, treated  3. Acute kidney injury, improved  4. Hypomagnesemia, treated  5. Hypokalemia, treated  6. Mildly elevated INR, improved post vitamin K x 1  7. Advanced age  8. Frequent falls and weakness  9. History of B12 deficiency  10. Dysphagia ruled out  11. acute on chronic constipation, resolved  12. Shingles, new onset, treatment initiated, left upper back and arm  13. Acute CHF, unable to specify post fluid resuscitation  14. Right foot wound with mild surrounding erythema, no purulence or pus   15. 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 We discussed that due to her advanced age and acute illness resulting in prolonged hospitalization, her mental status may not ever return to normal and she will likely have some days  of waxing and waning. We also discussed that despite her hematoma size improving, it can still cause her to have SIADH resulting in hyponatremia and hyponatremia in turn is likely causing her mental status to worsen. I did speak to Dr. Slade from nephrology to evaluate the patient. Recommendations appreciated. Urine and serum osmolality and sodium ordered. Will do q6 - q8 BMP which he would like to be called about, so that he may make appropriate changes as needed.     Anticipated discharge to [facility] in 24-48 hours, pending improvement of mental status and sodium levels.     Details were discussed with the patient's granddaughter.    I also discussed the details with the nursing staff.    Rest as ordered.    Dominique Dobson MD  08/20/18  3:07 PM    Please note that portions of this note may have been completed with a voice recognition program. Efforts were made to edit the dictations, but occasionally words are mistranscribed.

## 2018-08-20 NOTE — SIGNIFICANT NOTE
08/20/18 1422   Rehab Treatment   Discipline physical therapy assistant   Reason Treatment Not Performed unavailable for treatment  (Was not able to awaken patient. Informed nurse. Will f/u with pt at a later time. )

## 2018-08-20 NOTE — PLAN OF CARE
Problem: Patient Care Overview  Goal: Plan of Care Review  Outcome: Ongoing (interventions implemented as appropriate)   08/20/18 1966   Coping/Psychosocial   Plan of Care Reviewed With patient   Plan of Care Review   Progress no change   OTHER   Outcome Summary OT tx completed. Patient completed bed mobility with min A; functional transfers using RW x 2 with min A at EOB with knees buckling unexpectedly and sat EOB with posterior lean noted x 10 mins with min A. Continue OT POC
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
## 2018-08-20 NOTE — PROGRESS NOTES
Continued Stay Note   [facility]     Patient Name: Deidre Bobby  MRN: [id number]  Today's Date: 8/20/2018    Admit Date: 8/14/2018          Discharge Plan     Row Name 08/20/18 1028       Plan    Plan Comments [facility]  can still accept  pt when medically  ready for Discharge Informed Heidi of new diagnosis of shingles               Discharge Codes    No documentation.       Expected Discharge Date and Time     Expected Discharge Date Expected Discharge Time    Aug 21, 2018             Juana Yanes

## 2018-08-20 NOTE — SIGNIFICANT NOTE
08/20/18 1056   Rehab Treatment   Discipline occupational therapist   Reason Treatment Not Performed unavailable for treatment  (Patient sleeping soundly; will follow up at later time)

## 2018-08-21 NOTE — PROGRESS NOTES
"Hospitalist Progress Note.    LOS: 7 days    Patient Care Team:  Nancy Morrison MD as PCP - General  Nancy Morrison MD as PCP - Family Medicine    Chief Complaint:    F/u AMS, F/u SDH, F/u hyponatremia    Subjective   Patient seen and examined this morning. She is alert, oriented to person and place. She has no physical complaints, but upset that her breakfast was cold this morning. We did get her a different tray. She denies any c/o chest pain, soa, headache, blurry vision or double vision.     Review of Systems:    Cannot be obtained due to patient's inability to hold a conversation due to poor mentation.     Objective     Vital Signs  /78 (Patient Position: Lying)   Pulse 59   Temp 98 °F (36.7 °C) (Oral)   Resp 18   Ht 165.1 cm (65\")   Wt 47.6 kg (105 lb)   SpO2 98%   BMI 17.47 kg/m²       No intake/output data recorded.    Intake/Output Summary (Last 24 hours) at 08/21/18 1228  Last data filed at 08/21/18 0537   Gross per 24 hour   Intake              340 ml   Output              900 ml   Net             -560 ml       Physical Exam:    General Appearance: alert, oriented x 2, no acute distress, frail, chronically ill appearing  HEENT: pupils round and reactive to light, oral mucosa dry, extraocular movements intact.  Neck: supple, no JVD, trachea midline  Lungs: Clear to Auscultation, unlabored breathing effort  Heart: RRR, normal S1 and S2, no S3, no rub  Abdomen: soft, non-tender, no palpable bladder, present bowel sounds to auscultation  Extremities: no edema, cyanosis or clubbing. abrasions and skin tears, open wound on the plantar surface of the right foot with mild surrounding erythema and edema   Neuro: normal speech and mental status, grossly non focal.     Results Review:      Results from last 7 days  Lab Units 08/21/18  0612 08/20/18  1658 08/20/18  1200 08/20/18  0357  08/19/18  0806 08/18/18  1338  08/16/18  0511   SODIUM mmol/L 124* 124* 127* 129*  < > 125* 126*  < > 132* "   POTASSIUM mmol/L 3.8  --  4.3 4.0  < > 3.8 4.1  < > 2.7*   CHLORIDE mmol/L 90*  --  96* 97*  < > 95* 96*  < > 92*   CO2 mmol/L 24.0*  --  23.0* 23.0*  < > 24.0* 23.0*  < > 31.0*   BUN mg/dL 18  --  15 16  < > 10 13  < > 14   CREATININE mg/dL 0.70  --  0.60 0.70  < > 0.60 0.80  < > 0.60   CALCIUM mg/dL 8.5  --  8.5 8.3*  < > 8.2* 8.0*  < > 8.3*   BILIRUBIN mg/dL  --   --   --   --   --  0.5 0.2  --  0.4   ALK PHOS U/L  --   --   --   --   --  70 66  --  84   ALT (SGPT) U/L  --   --   --   --   --  28 27  --  32   AST (SGOT) U/L  --   --   --   --   --  30 30  --  44   GLUCOSE mg/dL 104*  --  107* 104*  < > 95 159*  < > 119*   < > = values in this interval not displayed.    Estimated Creatinine Clearance: 33 mL/min (by C-G formula based on SCr of 0.7 mg/dL).      Results from last 7 days  Lab Units 08/19/18 0806 08/18/18  0546 08/17/18  0517  08/15/18  0637   MAGNESIUM mg/dL 1.5* 1.5* 1.9  < >  --    PHOSPHORUS mg/dL  --   --   --   --  2.5   < > = values in this interval not displayed.      Results from last 7 days  Lab Units 08/20/18  1658   URIC ACID mg/dL 3.7         Results from last 7 days  Lab Units 08/19/18  0806 08/18/18  0546 08/17/18  0517 08/16/18  0511 08/15/18  0637   WBC 10*3/mm3 11.68* 11.90* 10.28 10.48 11.62*   HEMOGLOBIN g/dL 11.6* 12.5 13.2 12.4 13.0   PLATELETS 10*3/mm3 320 314 321 327 316         Results from last 7 days  Lab Units 08/17/18  0517 08/15/18  0816 08/14/18  1910   INR  1.14* 1.22* 1.27*         Imaging Results (last 24 hours)     Procedure Component Value Units Date/Time    XR Chest 1 View [[id number]] Collected:  08/19/18 1601     Updated:  08/20/18 1458    Narrative:       FINAL REPORT    TECHNIQUE:  Single view chest    CLINICAL HISTORY:  cough and congestion, possible fluid overload or pneumonia    FINDINGS:  A single view of the chest was obtained and compared to prior  exam from August 14, 2018 .  The heart size is stable.  The  mediastinum is unremarkable.  There is a left  lower lobe opacity  likely secondary to pneumonia.  There is no pneumothorax.  Osseous structures are unremarkable.      Impression:       Left lower lobe opacity likely secondary to pneumonia.    Authenticated by Tavo Landry on 08/19/2018 04:01:18 PM    CT Head Without Contrast [[id number]] Collected:  08/14/18 1717     Updated:  08/20/18 1453    Narrative:       FINAL REPORT    TECHNIQUE:  Multiple contiguous transaxial slices through the head were  obtained without the intravenous administration of contrast.    CLINICAL HISTORY:  Altered mental status    FINDINGS:  There is an acute high density subdural hematoma along the falx  posteriorly measuring 5 mm in greatest dimension.  There is no  mass effect or shift.  There is age-appropriate cortical atrophy  with associated ventricular enlargement. Patchy periventricular  white matter low attenuation is most consistent with chronic  microvascular ischemia. There is no acute infarct or mass  effect. If concern persists, recommend MRI. There is no sinus  air-fluid level. The calvarium is intact.      Impression:       Subdural hematoma along the falx.  Recommend follow-up CT in  12-24 hours  Atrophy with chronic microvascular ischemia    Authenticated by Katy Wong MD on 08/14/2018 05:17:55 PM          acyclovir 400 mg Oral TID   cefdinir 300 mg Oral Q12H   famotidine 20 mg Oral Daily   furosemide 20 mg Oral Daily   lactobacillus acidophilus 1 capsule Oral TID   levothyroxine 112 mcg Oral Q AM   losartan 25 mg Oral Q24H   magnesium oxide 400 mg Oral TID   megestrol acetate 400 mg Oral BID   metoprolol tartrate 12.5 mg Oral Q12H   multivitamin with minerals 1 tablet Oral Daily   mupirocin  Topical Q12H   pantoprazole 40 mg Oral Q AM   potassium chloride 20 mEq Oral Daily   sodium chloride 1 g Oral TID With Meals          Medication Review:   Current Facility-Administered Medications   Medication Dose Route Frequency Provider Last Rate Last Dose   •  acetaminophen (TYLENOL) tablet 650 mg  650 mg Oral Q4H PRN Ayaka Zuniga MD       • acyclovir (ZOVIRAX) suspension 400 mg  400 mg Oral TID Martha Groves DO   400 mg at 08/21/18 1043   • bisacodyl (DULCOLAX) suppository 10 mg  10 mg Rectal Daily PRN Martha Groves DO   10 mg at 08/17/18 1043   • cefdinir (OMNICEF) capsule 300 mg  300 mg Oral Q12H Dominique Dobson MD   300 mg at 08/21/18 1043   • CHAPSTICK 1 each  1 each Apply externally PRN Martha Groves DO       • docusate sodium (COLACE) capsule 100 mg  100 mg Oral Daily PRN Ayaka Zuniga MD   100 mg at 08/19/18 0954   • famotidine (PEPCID) tablet 20 mg  20 mg Oral Daily Ayaka Zuniga MD   20 mg at 08/21/18 1044   • furosemide (LASIX) tablet 20 mg  20 mg Oral Daily Shashi Sloan MD   20 mg at 08/21/18 1213   • hydrALAZINE (APRESOLINE) injection 20 mg  20 mg Intravenous Q6H PRN Martha Groves DO   20 mg at 08/16/18 1931   • HYDROcodone-acetaminophen (NORCO) 5-325 MG per tablet 1 tablet  1 tablet Oral Q8H PRN Ayaka Zuniga MD   1 tablet at 08/20/18 2159   • ipratropium-albuterol (DUO-NEB) nebulizer solution 3 mL  3 mL Nebulization Q4H PRN Martha Groves DO       • lactobacillus acidophilus (RISAQUAD) capsule 1 capsule  1 capsule Oral TID Martha Groves DO   1 capsule at 08/21/18 1042   • levothyroxine (SYNTHROID, LEVOTHROID) tablet 112 mcg  112 mcg Oral Q AM Martha Groves DO   112 mcg at 08/21/18 0523   • lidocaine (XYLOCAINE) 2 % jelly   Topical PRN Martha Groves DO       • losartan (COZAAR) tablet 25 mg  25 mg Oral Q24H Martha Groves DO   25 mg at 08/21/18 1041   • magnesium oxide (MAGOX) tablet 400 mg  400 mg Oral TID Martha Groves DO   400 mg at 08/21/18 1040   • megestrol acetate (MEGACE) oral suspension 400 mg  400 mg Oral BID Martha Groves DO   400 mg at 08/21/18 1041   • metoprolol tartrate (LOPRESSOR) tablet 12.5 mg  12.5 mg Oral Q12H Martha Groves DO   12.5 mg at 08/21/18 1041   •  multivitamin with minerals 1 tablet  1 tablet Oral Daily Martha Groves, DO   1 tablet at 08/21/18 1040   • mupirocin (BACTROBAN) 2 % cream   Topical Q12H Ayaka Zuniga MD       • ondansetron (ZOFRAN) injection 4 mg  4 mg Intravenous Q6H PRN Ayaka Zuniga MD       • pantoprazole (PROTONIX) EC tablet 40 mg  40 mg Oral Q AM Martha Groves, DO   40 mg at 08/21/18 0523   • potassium chloride (KLOR-CON) packet 20 mEq  20 mEq Oral Daily Martha Groves, DO   20 mEq at 08/21/18 1040   • sodium chloride 0.9 % flush 1-10 mL  1-10 mL Intravenous PRN Ayaka Zuniga MD   10 mL at 08/20/18 2158   • sodium chloride 0.9 % flush 10 mL  10 mL Intravenous PRN Reno Saini PA-C       • sodium chloride tablet 1 g  1 g Oral TID With Meals Shashi Solan MD   1 g at 08/21/18 1214     Principal Problem:    Subdural hematoma (CMS/HCC)  Active Problems:    Fall at home    Pyelonephritis    Acute renal failure (ARF) (CMS/HCC)    Assessment/Plan   1. Acute subdural hematoma, post fall at assisted living facility, stable, nonoperative, improved  2. Acute pyelonephritis, prior to admission, with Ecoli, treated  3. Acute kidney injury, improved  4. Hypomagnesemia, treated  5. Hypokalemia, treated  6. Mildly elevated INR, improved post vitamin K x 1  7. Advanced age  8. Frequent falls and weakness  9. History of B12 deficiency  10. Dysphagia ruled out  11. acute on chronic constipation, resolved  12. Shingles, new onset, treatment initiated, left upper back and arm  13. Acute on chronic diastolic CHF  14. Right foot wound with mild surrounding erythema, no purulence or pus   15. 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 Left lower lobe pneumonia, more evident on CXR from 08/19, likely present on admission but obscured by edema    Patient is much more alert this morning and without any acute complaints. Her oxygenation is adequate on room air.    Sodium levels remain around 124 despite receiving lasix . I did discuss this with Dr. Slade, he would have preferred Tolvaptan but we do not carry on formulary, so recommends Lasix with salt tablets for treatment of SIADH. Pending urine studies - sodium and osmolality.   I have placed her on 5 days of Omnicef, based on CXR read from 08/19. She likely had an infiltrate on admission that was obscured by edema on admission. However, she remains afebrile and without any significant leukocytosis.   I spoke with the patient's granddaughter to update her on her progress. She is amenable to the patient going to [facility], likely tomorrow.     Details were discussed with the patient's granddaughter.    I also discussed the details with the nursing staff.    Rest as ordered.    Dominique Dobson MD  08/21/18  12:28 PM    Please note that portions of this note may have been completed with a voice recognition program. Efforts were made to edit the dictations, but occasionally words are mistranscribed.

## 2018-08-21 NOTE — PROGRESS NOTES
LOS: 7 days   Patient Care Team:  Nancy Morrison MD as PCP - General  Nancy Morrison MD as PCP - Family Medicine    Chief Complaint:  Renal issues    Subjective     Interval History:     Patient Complaints: Chart reviewed.   Seems better this am  Talking more,asking questions  Overall weak, no SOA/swelling      acyclovir 400 mg Oral TID   cefdinir 300 mg Oral Q12H   famotidine 20 mg Oral Daily   furosemide 20 mg Oral Daily   lactobacillus acidophilus 1 capsule Oral TID   levothyroxine 112 mcg Oral Q AM   losartan 25 mg Oral Q24H   magnesium oxide 400 mg Oral TID   megestrol acetate 400 mg Oral BID   metoprolol tartrate 12.5 mg Oral Q12H   multivitamin with minerals 1 tablet Oral Daily   mupirocin  Topical Q12H   pantoprazole 40 mg Oral Q AM   potassium chloride 20 mEq Oral Daily   sodium chloride 1 g Oral TID With Meals          Review of Systems:   General : No pain, no chills  HEENT: No headache, no nosebleed, no sore throat, no blurry vision  Chest : no chest pain, no palpitations  Respiratory: No cough, no SOA, no hemoptysis  GI:  No N/V/D, no abdominal pain  Skin : +r linette, no pruritus  Musculoskeletal : no flank pain, no joint effusions  Neuro :  weakness, no numbness  Endocrine: no heat/cold intolerance, no weight loss  Genitourinary: no dysuria or hematuria    Objective     Vital Signs  Temp:  [97.3 °F (36.3 °C)-99.1 °F (37.3 °C)] 98 °F (36.7 °C)  Heart Rate:  [53-75] 59  Resp:  [16-18] 18  BP: (103-153)/(53-84) 146/78    Intake/Output Summary (Last 24 hours) at 08/21/18 1132  Last data filed at 08/21/18 0537   Gross per 24 hour   Intake              340 ml   Output              900 ml   Net             -560 ml           Physical Exam:     General Appearance:    Alert,  in no acute distress,weak   Head:    Normocephalic, without obvious abnormality, atraumatic   Ears:    Ears appear intact , no drainage   Throat:    no thrush, oral mucosa moist   Neck:   No adenopathy, supple,  no thyromegaly   Back:      No CVA tenderness to palpation   Lungs:     respirations unlabored, no wheezes or rhonchi    Heart:    Regular rate  , normal S1 and S2, soft  murmur, no gallop, no rub   Abdomen:     Normal bowel sounds, no masses, soft non-tender   Extremities:    No lower extremity edema, no cyanosis   Skin:   Dry, shingles L arm                Results Review:      Results from last 7 days  Lab Units 08/21/18  0612 08/20/18  1658 08/20/18  1200 08/20/18  0357   SODIUM mmol/L 124* 124* 127* 129*   POTASSIUM mmol/L 3.8  --  4.3 4.0   CHLORIDE mmol/L 90*  --  96* 97*   CO2 mmol/L 24.0*  --  23.0* 23.0*   BUN mg/dL 18  --  15 16   CREATININE mg/dL 0.70  --  0.60 0.70   GLUCOSE mg/dL 104*  --  107* 104*   CALCIUM mg/dL 8.5  --  8.5 8.3*       Results from last 7 days  Lab Units 08/19/18  0806 08/18/18  0546 08/17/18  0517   WBC 10*3/mm3 11.68* 11.90* 10.28   HEMOGLOBIN g/dL 11.6* 12.5 13.2   HEMATOCRIT % 34.4* 37.7 40.2   PLATELETS 10*3/mm3 320 314 321         Assessment/Plan     Principal Problem:    Subdural hematoma (CMS/HCC)  Active Problems:    Fall at home    Pyelonephritis    Acute renal failure (ARF) (CMS/HCC)    1.  Hyponatremia- likely related to SDH, osmolalities pending.  D/W pharmacy, do not have [facility].  Will use lasix/salt tab combo     2. ARIADNA- resolved with fluids on admit, watch ARB     3. HTN- labile, avoid low BP's     4. Low mg- on replacement     5. Hypokalemia- normalized     6. UTI, ? PNA     7. SDH      Plan:  Lasix/salt tabs    If sodium improves , can be discharged tomorrow, this is a chronic issue as sodium earlier this year was in low 130's  BMP can be checked weekly x 3 weeks at NH

## 2018-08-21 NOTE — PLAN OF CARE
Problem: Urinary Tract Infection (Adult)  Goal: Signs and Symptoms of Listed Potential Problems Will be Absent, Minimized or Managed (Urinary Tract Infection)   08/21/18 0112   Goal/Outcome Evaluation   Problems Assessed (Urinary Tract Infection) pain;infection progression   Problems Present (UTI) infection progression

## 2018-08-21 NOTE — PLAN OF CARE
Problem: Skin Injury Risk (Adult)  Goal: Skin Health and Integrity   08/21/18 0113   Skin Injury Risk (Adult)   Skin Health and Integrity making progress toward outcome

## 2018-08-21 NOTE — PLAN OF CARE
Problem: Fall Risk (Adult)  Goal: Absence of Fall   08/21/18 0113   Fall Risk (Adult)   Absence of Fall making progress toward outcome

## 2018-08-21 NOTE — SIGNIFICANT NOTE
08/21/18 1437   Rehab Treatment   Discipline physical therapy assistant   Reason Treatment Not Performed patient/family declined treatment  (Pt declined OOB activities as well as supine ther ex. Therapy will f/u with pt tomorrow. )

## 2018-08-21 NOTE — PLAN OF CARE
Problem: Patient Care Overview  Goal: Plan of Care Review  Outcome: Ongoing (interventions implemented as appropriate)   08/21/18 1250   Coping/Psychosocial   Plan of Care Reviewed With patient   Plan of Care Review   Progress improving   OTHER   Outcome Summary OT tx completed. Patient completed, bed mobility, functional transfers and toileting task with min A using BSC and rollator. Continue OT POC
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
## 2018-08-22 NOTE — PLAN OF CARE
Problem: Urinary Tract Infection (Adult)  Goal: Signs and Symptoms of Listed Potential Problems Will be Absent, Minimized or Managed (Urinary Tract Infection)  Outcome: Ongoing (interventions implemented as appropriate)   08/22/18 7850   Goal/Outcome Evaluation   Problems Assessed (Urinary Tract Infection) all   Problems Present (UTI) none

## 2018-08-22 NOTE — PLAN OF CARE
Problem: Urinary Tract Infection (Adult)  Goal: Signs and Symptoms of Listed Potential Problems Will be Absent, Minimized or Managed (Urinary Tract Infection)  Outcome: Ongoing (interventions implemented as appropriate)   08/22/18 6417   Goal/Outcome Evaluation   Problems Assessed (Urinary Tract Infection) all   Problems Present (UTI) none

## 2018-08-22 NOTE — PROGRESS NOTES
LOS: 8 days   Patient Care Team:  Nancy Morrison MD as PCP - General  Nancy Morrison MD as PCP - Family Medicine    Chief Complaint:  Renal issues    Subjective     Interval History:     Patient Complaints: Chart reviewed.   Resting, no complaints  No SOA  No Chills  No pain      acyclovir 400 mg Oral TID   cefdinir 300 mg Oral Q12H   famotidine 20 mg Oral Daily   furosemide 20 mg Oral Daily   hydrocortisone  Topical Q8H   lactobacillus acidophilus 1 capsule Oral TID   levothyroxine 112 mcg Oral Q AM   losartan 25 mg Oral Q24H   magnesium oxide 400 mg Oral TID   megestrol acetate 400 mg Oral BID   metoprolol tartrate 12.5 mg Oral Q12H   multivitamin with minerals 1 tablet Oral Daily   mupirocin  Topical Q12H   pantoprazole 40 mg Oral Q AM   potassium chloride 20 mEq Oral Daily   sodium chloride 1 g Oral TID With Meals          Review of Systems:   General : No pain, no chills  HEENT: No headache, no nosebleed, no sore throat, no blurry vision  Chest : no chest pain, no palpitations  Respiratory: No cough, no SOA, no hemoptysis  GI:  No N/V/D, no abdominal pain  Skin : +r treva, no pruritus  Musculoskeletal : no flank pain, no joint effusions  Neuro :  weakness, no numbness  Endocrine: no heat/cold intolerance, no weight loss  Genitourinary: no dysuria or hematuria    Objective     Vital Signs  Temp:  [97.7 °F (36.5 °C)-98.5 °F (36.9 °C)] 97.7 °F (36.5 °C)  Heart Rate:  [] 64  Resp:  [18] 18  BP: ()/(44-76) 116/60    Intake/Output Summary (Last 24 hours) at 08/22/18 1415  Last data filed at 08/22/18 1300   Gross per 24 hour   Intake              720 ml   Output             1900 ml   Net            -1180 ml           Physical Exam:     General Appearance:     in no acute distress,weak   Head:    Normocephalic, without obvious abnormality, atraumatic   Ears:    Ears appear intact , no drainage   Throat:    no thrush, oral mucosa moist   Neck:   No adenopathy, supple,  no thyromegaly   Back:     No CVA  tenderness to palpation   Lungs:     respirations unlabored, no wheezes or rhonchi    Heart:    Regular rate  , normal S1 and S2, soft  murmur, no gallop, no rub   Abdomen:     Normal bowel sounds, no masses, soft non-tender   Extremities:    No lower extremity edema, no cyanosis   Skin:   Dry, shingles L arm                Results Review:      Results from last 7 days  Lab Units 08/22/18  0612 08/21/18  0612 08/20/18  1658 08/20/18  1200   SODIUM mmol/L 128* 124* 124* 127*   POTASSIUM mmol/L 4.1 3.8  --  4.3   CHLORIDE mmol/L 91* 90*  --  96*   CO2 mmol/L 26.0 24.0*  --  23.0*   BUN mg/dL 25* 18  --  15   CREATININE mg/dL 0.80 0.70  --  0.60   GLUCOSE mg/dL 118* 104*  --  107*   CALCIUM mg/dL 9.0 8.5  --  8.5       Results from last 7 days  Lab Units 08/19/18  0806 08/18/18  0546 08/17/18  0517   WBC 10*3/mm3 11.68* 11.90* 10.28   HEMOGLOBIN g/dL 11.6* 12.5 13.2   HEMATOCRIT % 34.4* 37.7 40.2   PLATELETS 10*3/mm3 320 314 321         Assessment/Plan     Principal Problem:    Subdural hematoma (CMS/HCC)  Active Problems:    Fall at home    Pyelonephritis    Acute renal failure (ARF) (CMS/HCC)    1.  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  [facility]  Will use lasix/salt tab combo-- improving     2. ARIADNA- resolved with fluids on admit, watch ARB     3. HTN- labile, avoid low BP's     4. Low mg- on replacement     5. Hypokalemia- normalized     6. UTI, ? PNA     7. SDH      Plan:  Lasix/salt tabs seem to be working  BMP  can be checked weekly x 3 weeks  OK to D/C

## 2018-08-22 NOTE — DISCHARGE SUMMARY
[facility]   DISCHARGE SUMMARY      Name:  Deidre Bobby   Age:  89 y.o.  Sex:  female  :  1925  MRN:  [id number]   Visit Number:  [id number]    Admission Date:  2018  Date of Discharge:  2018  Primary Care Physician:  Nancy Morrison MD    Discharge Diagnoses:   1. Acute subdural hematoma, post fall at assisted living facility, stable, nonoperative, improved  2. Acute pyelonephritis, prior to admission, with Ecoli, treated  3. Acute kidney injury, improved  4. Hypomagnesemia, treated  5. Hypokalemia, treated  6. Mildly elevated INR, improved post vitamin K x 1  7. Advanced age  8. Frequent falls and weakness  9. History of B12 deficiency  10. Dysphagia ruled out  11. acute on chronic constipation, resolved  12. Shingles, new onset, treatment initiated, left upper back and arm  13. Acute on chronic diastolic CHF  14. Right foot wound with mild surrounding erythema, no purulence or pus   15. 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 Left lower lobe pneumonia, more evident on CXR from , likely present on admission but obscured by edema    Principal Problem:    Subdural hematoma (CMS/HCC)  Active Problems:    Fall at home    Pyelonephritis    Acute renal failure (ARF) (CMS/HCC)      Presenting Problem:    Acute UTI (urinary tract infection) [N39.0]     Consults:     Consults     Date and Time Order Name Status Description    2018 0934 Inpatient Nephrology Consult          Consulting Physician(s)     Provider Relationship Specialty    Shashi Sloan MD Consulting Physician Nephrology          Procedures Performed:           History of presenting illness/Hospital Course:  Patient is a 89-year-old female with medical history of hypertension who is a resident of [facility] living facility was brought in for evaluation of altered mental status.  Patient apparently sustained a fall a  week prior to admission.  A CT head was obtained which showed a subdural hematoma.  The neurosurgery team at  did not think that the patient would warrant surgery therefore the patient was admitted to the hospitalist service here at [facility] for observation.  On admission patient was also noted to have a urinary tract infection.  A repeat CT of the head was performed about 24 hours after the initial CT head which showed improvement in the size of the hematoma.    However, patient's mental status and appetite remained quite poor.  She then developed worsening hyponatremia.  She was placed on IV fluids which actually worsened her sodium level with signs of acute fluid overload with increased cough and chest congestion.  She continued to express intermittent confusion.  A chest x-ray was obtained which showed diffuse interstitial changes.  A 2-D echocardiogram was also obtained which shows a normal ejection fraction.  Patient was subsequently started on one dose of 3% normal saline which did show some improvement in her sodium levels.  However once the hypertonic saline was discontinued patient's sodium level again dropped to the mid 120s.  Due to this there was some suspicion that the hyponatremia is likely SIADH as a result of head trauma.    Dr. Paulson from nephrology was consulted.  He recommended tolvaptan which is not formally here at [facility].  Therefore, patient was tried on Lasix with salt tablets.  This did show improvement in   her sodium level.  Patient will be continued on 20 mg of Lasix along with 1 g sodium chloride tablet 3 times a day with meals.  Dr. Sapp recommends every other day sodium check for 1 week at least.    This morning, patient is alert, oriented to person and place.  She is able to verbalize that she has not been receiving any eyedrops which she uses every day.  She now has a good appetite and was able to eat 50% of her breakfast this morning with assistance.  She is  maintaining adequate oxygen saturation on room air.  This morning, her sodium level is up to 128.  Her baseline is around 130 to 135.    Of note, during her hospitalization patient was noted to have a shingles breakout on the left side.  She was placed on acyclovir and will need to finish another 3 days of acyclovir to finish a total of seven-day course.    Follow-up chest x-ray to evaluate patient's pulmonary congestion revealed a possible infiltrate on the left lower lobe.  For this she was placed on a total of 5 days of Omnicef.  Patient has remained afebrile without any leukocytosis or significant cough.    Patient was noted to have some electrolyte abnormalities other than the hyponatremia, including hypokalemia and hypomagnesemia.  Patient will continue on oral supplementation of both.       Vital Signs:    Temp:  [97.7 °F (36.5 °C)-98.5 °F (36.9 °C)] 97.7 °F (36.5 °C)  Heart Rate:  [] 64  Resp:  [18] 18  BP: ()/(44-76) 116/60    Physical Exam:    General Appearance:  Alert and cooperative, not in any acute distress, cachectic, chronically ill-appearing, pleasant elderly woman    Head:  Atraumatic and normocephalic, without obvious abnormality.   Eyes:          PERRLA, conjunctivae and sclerae normal, no Icterus. No pallor. Extra-occular movements are within normal limits.   Ears:  Ears appear intact with no abnormalities noted.   Throat: No oral lesions, no thrush, oral mucosa moist.   Neck: Supple, trachea midline, no thyromegaly, no carotid bruit.   Back:   No kyphoscoliosis present. No tenderness to palpation,   range of motion normal.   Lungs:   Chest shape is normal. Breath sounds heard bilaterally equally.  No crackles or wheezing. No Pleural rub or bronchial breathing.   Heart:  Normal S1 and S2, no murmur, no gallop, no rub. No JVD.   Abdomen:   Normal bowel sounds, no masses, no organomegaly. Soft, non-tender, non-distended, no guarding, no rebound tenderness.   Extremities: Moves all  extremities well, no edema, no cyanosis, no clubbing.  Diffuse muscle atrophy of bilateral upper and no extremities    Pulses: Pulses palpable and equal bilaterally.   Skin: No bleeding or  Bruising, her pedal take rash over the left arm and shoulder    Lymph nodes: No palpable adenopathy.   Neurologic: Alert and oriented x 2. Moves all four limbs equally. No tremors. No facial asymetry.     Pertinent Lab Results:       Results from last 7 days  Lab Units 08/22/18  0612 08/21/18  0612 08/20/18  1658 08/20/18  1200  08/19/18  0806 08/18/18  1338  08/16/18  0511   SODIUM mmol/L 128* 124* 124* 127*  < > 125* 126*  < > 132*   POTASSIUM mmol/L 4.1 3.8  --  4.3  < > 3.8 4.1  < > 2.7*   CHLORIDE mmol/L 91* 90*  --  96*  < > 95* 96*  < > 92*   CO2 mmol/L 26.0 24.0*  --  23.0*  < > 24.0* 23.0*  < > 31.0*   BUN mg/dL 25* 18  --  15  < > 10 13  < > 14   CREATININE mg/dL 0.80 0.70  --  0.60  < > 0.60 0.80  < > 0.60   CALCIUM mg/dL 9.0 8.5  --  8.5  < > 8.2* 8.0*  < > 8.3*   BILIRUBIN mg/dL  --   --   --   --   --  0.5 0.2  --  0.4   ALK PHOS U/L  --   --   --   --   --  70 66  --  84   ALT (SGPT) U/L  --   --   --   --   --  28 27  --  32   AST (SGOT) U/L  --   --   --   --   --  30 30  --  44   GLUCOSE mg/dL 118* 104*  --  107*  < > 95 159*  < > 119*   < > = values in this interval not displayed.    Results from last 7 days  Lab Units 08/19/18  0806 08/18/18  0546 08/17/18  0517   WBC 10*3/mm3 11.68* 11.90* 10.28   HEMOGLOBIN g/dL 11.6* 12.5 13.2   HEMATOCRIT % 34.4* 37.7 40.2   PLATELETS 10*3/mm3 320 314 321       Results from last 7 days  Lab Units 08/17/18  0517   INR  1.14*                               Invalid input(s): USDES,  BLOODU, NITRITITE, BACT, EP  Pain Management Panel     There is no flowsheet data to display.              Pertinent Radiology Results:    Imaging Results (all)     Procedure Component Value Units Date/Time    XR Foot 3+ View Right [[id number]] Collected:  08/21/18 1533     Updated:  08/21/18 1535     Narrative:       PROCEDURE: XR FOOT 3+ VW RIGHT-     History: plantar wound; N39.0-Urinary tract infection, site not  specified; I62.00-Nontraumatic subdural hemorrhage, unspecified;  Z74.09-Other reduced mobility     COMPARISON: None.     FINDINGS:  A 3 view exam demonstrates no acute fracture or dislocation.  The joint spaces are preserved. Note is made of a wound in the plantar  soft tissues.           Impression:       No radiographic evidence of osteomyelitis.                 This report was finalized on 8/21/2018 3:33 PM by Christiano Umanzor M.D..    XR Chest 1 View [[id number]] Collected:  08/19/18 1601     Updated:  08/20/18 1458    Narrative:       FINAL REPORT    TECHNIQUE:  Single view chest    CLINICAL HISTORY:  cough and congestion, possible fluid overload or pneumonia    FINDINGS:  A single view of the chest was obtained and compared to prior  exam from August 14, 2018 .  The heart size is stable.  The  mediastinum is unremarkable.  There is a left lower lobe opacity  likely secondary to pneumonia.  There is no pneumothorax.  Osseous structures are unremarkable.      Impression:       Left lower lobe opacity likely secondary to pneumonia.    Authenticated by Tavo Landry on 08/19/2018 04:01:18 PM    CT Head Without Contrast [[id number]] Collected:  08/14/18 1717     Updated:  08/20/18 1453    Narrative:       FINAL REPORT    TECHNIQUE:  Multiple contiguous transaxial slices through the head were  obtained without the intravenous administration of contrast.    CLINICAL HISTORY:  Altered mental status    FINDINGS:  There is an acute high density subdural hematoma along the falx  posteriorly measuring 5 mm in greatest dimension.  There is no  mass effect or shift.  There is age-appropriate cortical atrophy  with associated ventricular enlargement. Patchy periventricular  white matter low attenuation is most consistent with chronic  microvascular ischemia. There is no acute infarct or mass  effect. If  concern persists, recommend MRI. There is no sinus  air-fluid level. The calvarium is intact.      Impression:       Subdural hematoma along the falx.  Recommend follow-up CT in  12-24 hours  Atrophy with chronic microvascular ischemia    Authenticated by Katy Wong MD on 08/14/2018 05:17:55 PM    CT Head Without Contrast [[id number]] Collected:  08/18/18 0908     Updated:  08/18/18 0912    Narrative:       PROCEDURE: CT HEAD WO CONTRAST-     HISTORY: Sub-dural hemorrhage     COMPARISON:  None .     TECHNIQUE: Multiple axial CT images were performed from the foramen  magnum to the vertex without enhancement.      FINDINGS: The previously described left parafalcine subdural hemorrhage  is much less conspicuous on today's exam and is not readily appreciable.  Cerebral atrophy is noted. Remote injury versus prominent perivascular  space in the left basal ganglia. Atherosclerosis is present. There is no  mass, mass effect or midline shift.  There is no hydrocephalus. The  paranasal sinuses are clear. Bone windows reveal no acute osseous  abnormalities.       Impression:       The previously described left parafalcine subdural  hemorrhage is much less conspicuous on today's exam and is not readily  appreciable.     Continued follow-up recommended             742.64 mGy.cm          This study was performed with techniques to keep radiation doses as low  as reasonably achievable (ALARA). Individualized dose reduction  techniques using automated exposure control or adjustment of mA and/or  kV according to the patient size were employed.      This report was finalized on 8/18/2018 9:10 AM by Tavo Landry DO.    CT Head Without Contrast [[id number]] Collected:  08/15/18 1001     Updated:  08/15/18 1011    Narrative:       CT HEAD WITHOUT CONTRAST-     HISTORY: Eval status of subdural hematoma; N39.0-Urinary tract  infection, site not specified; I62.00-Nontraumatic subdural hemorrhage,  unspecified.     TECHNIQUE:  Contiguous axial images were obtained from the skull vertex  to the skull base without administration of contrast.     FINDINGS: Comparison with 08/14/2018. Again identified is a left  parafalcine subdural hematoma superiorly. This measures 5 mm in maximal  thickness which is unchanged from previous. No other areas of new  hemorrhage compared to previous. No mass effect or midline shift. The  basal cisterns are patent. Ventricles are not enlarged. Diffuse cortical  volume loss and nonspecific white matter changes, likely related to  chronic microvascular ischemia. Prominent vascular calcifications.  Mandibular condyles are subluxed inferiorly, probably positional but  should be correlated clinically. Faint opacification of the left mastoid  air cell base.       Impression:       No significant change in the small left parafalcine subdural  hematoma. Additional follow-up recommended.                       This study was performed with techniques to keep radiation doses as low  as reasonably achievable (ALARA). Individualized dose reduction  techniques using automated exposure control or adjustment of mA and/or  kV according to the patient size were employed.      This report was finalized on 8/15/2018 10:09 AM by Perfecto Diaz MD.    XR Chest 1 View [[id number]] Collected:  08/15/18 0913     Updated:  08/15/18 0916    Narrative:       SINGLE VIEW CHEST     INDICATION: Altered mental status.     FINDINGS: Single frontal view of the chest without comparison. EKG leads  overlie the chest. Probable significant kyphosis. Heart size is normal.  Aorta is ectatic and partially calcified. No pneumothorax. Areas of  scarring and atelectasis involve the lung bases. Prominent degenerative  changes in the spine and shoulders.       Impression:       Atelectasis and/or scarring in the lung bases. When  clinically feasible, short-term follow-up PA and lateral views would be  useful for better evaluation.     This report was finalized  on 8/15/2018 9:14 AM by Perfecto Diaz MD.          Condition on Discharge:      Stable.    Code status during the hospital stay:    Code Status and Medical Interventions:   Ordered at: 08/14/18 1932     Limited Support to NOT Include:    Intubation     Code Status:    No CPR     Medical Interventions (Level of Support Prior to Arrest):    Limited       Discharge Disposition:    Skilled Nursing Facility (DC - External)    Discharge Medications:       Discharge Medications      New Medications      Instructions Start Date   acyclovir 200 MG/5ML suspension  Commonly known as:  ZOVIRAX   400 mg, Oral, 3 Times Daily      cefdinir 300 MG capsule  Commonly known as:  OMNICEF   300 mg, Oral, Every 12 Hours Scheduled      furosemide 20 MG tablet  Commonly known as:  LASIX   20 mg, Oral, Daily      hydrocortisone 1 % cream   Topical, Every 8 Hours Scheduled      ipratropium-albuterol 0.5-2.5 mg/3 ml nebulizer  Commonly known as:  DUO-NEB   3 mL, Nebulization, Every 4 Hours PRN      losartan 25 MG tablet  Commonly known as:  COZAAR   25 mg, Oral, Every 24 Hours Scheduled      magnesium oxide 400 (241.3 Mg) MG tablet tablet  Commonly known as:  MAGOX   400 mg, Oral, 3 Times Daily      megestrol acetate 400 MG/10ML suspension oral suspension  Commonly known as:  MEGACE   400 mg, Oral, 2 Times Daily      metoprolol tartrate 25 MG tablet  Commonly known as:  LOPRESSOR   12.5 mg, Oral, Every 12 Hours Scheduled      potassium chloride 20 MEQ packet  Commonly known as:  KLOR-CON   20 mEq, Oral, Daily      sodium chloride 1 g tablet   1 g, Oral, 3 Times Daily With Meals         Continue These Medications      Instructions Start Date   aspirin 81 MG EC tablet   81 mg, Oral, Daily      docusate sodium 250 MG capsule  Commonly known as:  COLACE   250 mg, Oral, 2 Times Daily PRN      HYDROcodone-acetaminophen 5-325 MG per tablet  Commonly known as:  LORTAB   1 tablet, Oral, Every 8 Hours PRN      levothyroxine 112 MCG tablet  Commonly known  as:  SYNTHROID, LEVOTHROID   112 mcg, Oral, Daily      MULTIPLE VITAMINS PO   Oral, Daily      pantoprazole 40 MG EC tablet  Commonly known as:  PROTONIX   40 mg, Oral, Daily      polyethylene glycol packet  Commonly known as:  MIRALAX   17 g, Oral, Daily PRN      PROBIOTIC DAILY PO   1 tablet, Oral, Daily      RA VITAMIN B-12 1000 MCG tablet controlled-release  Generic drug:  Cyanocobalamin ER   Oral, Daily      tetrahydrozoline 0.05 % ophthalmic solution   1 drop, Both Eyes, Daily PRN         Stop These Medications    PARoxetine 20 MG tablet  Commonly known as:  PAXIL     raNITIdine 150 MG tablet  Commonly known as:  ZANTAC            Discharge Diet:     Diet Instructions     Diet: Cardiac       Discharge Diet:  Cardiac          Activity at Discharge:     Activity Instructions     Activity as Tolerated             Follow-up Appointments:    Additional Instructions for the Follow-ups that You Need to Schedule     Discharge Follow-up with PCP    As directed      Currently Documented PCP:  Nancy Morrison MD  PCP Phone Number:  None    Follow Up Details:  1 week            Contact information for follow-up providers     Nancy Morrison MD .    Specialty:  Family Medicine  Why:  1 week                 Contact information for after-discharge care     Destination     [facility] .    Specialty:  Skilled Nursing Facility  Contact information:  [address] [place] [address] [phone]                             Future Appointments  Date Time Provider Department Center   8/30/2018 2:30 PM Nestor Browning DO [facility] None       Additional Instructions for the Follow-ups that You Need to Schedule     Discharge Follow-up with PCP    As directed      Currently Documented PCP:  Nancy Morrison MD  PCP Phone Number:  None    Follow Up Details:  1 week               Test Results Pending at Discharge:     Order Current Status    Osmolality, Urine - Urine, Catheter In process              Dominique Dobson MD  08/22/18  1:55 PM    Time spent: 32 minutes     Dictated utilizing Dragon dictation.

## 2018-08-22 NOTE — PLAN OF CARE
Problem: Fall Risk (Adult)  Goal: Absence of Fall  Outcome: Ongoing (interventions implemented as appropriate)   08/21/18 2246   Fall Risk (Adult)   Absence of Fall making progress toward outcome       Problem: Skin Injury Risk (Adult)  Goal: Skin Health and Integrity  Outcome: Ongoing (interventions implemented as appropriate)   08/21/18 2246   Skin Injury Risk (Adult)   Skin Health and Integrity making progress toward outcome       Problem: Urinary Tract Infection (Adult)  Goal: Signs and Symptoms of Listed Potential Problems Will be Absent, Minimized or Managed (Urinary Tract Infection)  Outcome: Ongoing (interventions implemented as appropriate)   08/21/18 2246   Goal/Outcome Evaluation   Problems Assessed (Urinary Tract Infection) infection progression;pain   Problems Present (UTI) infection progression;pain

## 2018-08-22 NOTE — DISCHARGE PLACEMENT REQUEST
"MASON Jimenez RN  Case Management  Phone:  [phone]; Fax:  [phone]    Discharge summary attached.    Marry Bobby (89 y.o. Female)     Date of Birth Social Security Number Address Home Phone MRN    02/14/1925  [address] [phone] [id number]    Jewish Marital Status          None        Admission Date Admission Type Admitting Provider Attending Provider Department, Room/Bed    8/14/18 Emergency Ayaka Zuniga MD Majumder, Mosumi, MD [facility] MED SURG  4, 413/1    Discharge Date Discharge Disposition Discharge Destination         Skilled Nursing Facility (DC - External)              Attending Provider:  Dominique Dobson MD    Allergies:  Penicillins, Codeine, Sulfa Antibiotics    Isolation:  Contact   Infection:  Herpes Zoster (08/20/18)   Code Status:  No CPR    Ht:  165.1 cm (65\")   Wt:  48.6 kg (107 lb 1 oz)    Admission Cmt:  None   Principal Problem:  Subdural hematoma (CMS/HCC) [I62.00]                 Active Insurance as of 8/14/2018     Primary Coverage     Payor Plan Insurance Group Employer/Plan Group    MEDICARE MEDICARE A & B      Payor Plan Address Payor Plan Phone Number Effective From Effective To    [address] [phone] 2/1/1990     [facility] [id number]       Subscriber Name Subscriber Birth Date Member ID       MARRY BOBBY 2/14/1925 [id number]           Secondary Coverage     Payor Plan Insurance Group Employer/Plan Group    AETNA COMMERCIAL AETNA [id number]     Payor Plan Address Payor Plan Phone Number Effective From Effective To    [address] [address]  2/21/1990     [facility] [id number]       Subscriber Name Subscriber Birth Date Member ID       MARRY BOBBY 2/14/1925 [id number]                 Emergency Contacts      (Rel.) Home Phone Work Phone Mobile Phone    Gaviota Marshall (Grandchild) [phone] -- --               Discharge Summary      Dominique Dobson MD at 8/22/2018  1:34 PM      "         [facility]   DISCHARGE SUMMARY      Name:  Deidre Bobby   Age:  89 y.o.  Sex:  female  :  1925  MRN:  [id number]   Visit Number:  [id number]    Admission Date:  2018  Date of Discharge:  2018  Primary Care Physician:  Nancy Morrison MD    Discharge Diagnoses:   1. Acute subdural hematoma, post fall at assisted living facility, stable, nonoperative, improved  2. Acute pyelonephritis, prior to admission, with Ecoli, treated  3. Acute kidney injury, improved  4. Hypomagnesemia, treated  5. Hypokalemia, treated  6. Mildly elevated INR, improved post vitamin K x 1  7. Advanced age  8. Frequent falls and weakness  9. History of B12 deficiency  10. Dysphagia ruled out  11. acute on chronic constipation, resolved  12. Shingles, new onset, treatment initiated, left upper back and arm  13. Acute on chronic diastolic CHF  14. Right foot wound with mild surrounding erythema, no purulence or pus   15. 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 Left lower lobe pneumonia, more evident on CXR from , likely present on admission but obscured by edema    Principal Problem:    Subdural hematoma (CMS/HCC)  Active Problems:    Fall at home    Pyelonephritis    Acute renal failure (ARF) (CMS/HCC)      Presenting Problem:    Acute UTI (urinary tract infection) [N39.0]     Consults:     Consults     Date and Time Order Name Status Description    2018 0934 Inpatient Nephrology Consult          Consulting Physician(s)     Provider Relationship Specialty    Shashi Sloan MD Consulting Physician Nephrology          Procedures Performed:           History of presenting illness/Hospital Course:  Patient is a 89-year-old female with medical history of hypertension who is a resident of [facility] living facility was brought in for evaluation of altered mental status.  Patient apparently sustained a  fall a week prior to admission.  A CT head was obtained which showed a subdural hematoma.  The neurosurgery team at  did not think that the patient would warrant surgery therefore the patient was admitted to the hospitalist service here at [facility] for observation.  On admission patient was also noted to have a urinary tract infection.  A repeat CT of the head was performed about 24 hours after the initial CT head which showed improvement in the size of the hematoma.    However, patient's mental status and appetite remained quite poor.  She then developed worsening hyponatremia.  She was placed on IV fluids which actually worsened her sodium level with signs of acute fluid overload with increased cough and chest congestion.  She continued to express intermittent confusion.  A chest x-ray was obtained which showed diffuse interstitial changes.  A 2-D echocardiogram was also obtained which shows a normal ejection fraction.  Patient was subsequently started on one dose of 3% normal saline which did show some improvement in her sodium levels.  However once the hypertonic saline was discontinued patient's sodium level again dropped to the mid 120s.  Due to this there was some suspicion that the hyponatremia is likely SIADH as a result of head trauma.    Dr. Paulson from nephrology was consulted.  He recommended tolvaptan which is not formally here at [facility].  Therefore, patient was tried on Lasix with salt tablets.  This did show improvement in   her sodium level.  Patient will be continued on 20 mg of Lasix along with 1 g sodium chloride tablet 3 times a day with meals.  Dr. Sapp recommends every other day sodium check for 1 week at least.    This morning, patient is alert, oriented to person and place.  She is able to verbalize that she has not been receiving any eyedrops which she uses every day.  She now has a good appetite and was able to eat 50% of her breakfast this morning with assistance.   She is maintaining adequate oxygen saturation on room air.  This morning, her sodium level is up to 128.  Her baseline is around 130 to 135.    Of note, during her hospitalization patient was noted to have a shingles breakout on the left side.  She was placed on acyclovir and will need to finish another 3 days of acyclovir to finish a total of seven-day course.    Follow-up chest x-ray to evaluate patient's pulmonary congestion revealed a possible infiltrate on the left lower lobe.  For this she was placed on a total of 5 days of Omnicef.  Patient has remained afebrile without any leukocytosis or significant cough.    Patient was noted to have some electrolyte abnormalities other than the hyponatremia, including hypokalemia and hypomagnesemia.  Patient will continue on oral supplementation of both.       Vital Signs:    Temp:  [97.7 °F (36.5 °C)-98.5 °F (36.9 °C)] 97.7 °F (36.5 °C)  Heart Rate:  [] 64  Resp:  [18] 18  BP: ()/(44-76) 116/60    Physical Exam:    General Appearance:  Alert and cooperative, not in any acute distress, cachectic, chronically ill-appearing, pleasant elderly woman    Head:  Atraumatic and normocephalic, without obvious abnormality.   Eyes:          PERRLA, conjunctivae and sclerae normal, no Icterus. No pallor. Extra-occular movements are within normal limits.   Ears:  Ears appear intact with no abnormalities noted.   Throat: No oral lesions, no thrush, oral mucosa moist.   Neck: Supple, trachea midline, no thyromegaly, no carotid bruit.   Back:   No kyphoscoliosis present. No tenderness to palpation,   range of motion normal.   Lungs:   Chest shape is normal. Breath sounds heard bilaterally equally.  No crackles or wheezing. No Pleural rub or bronchial breathing.   Heart:  Normal S1 and S2, no murmur, no gallop, no rub. No JVD.   Abdomen:   Normal bowel sounds, no masses, no organomegaly. Soft, non-tender, non-distended, no guarding, no rebound tenderness.   Extremities: Moves  all extremities well, no edema, no cyanosis, no clubbing.  Diffuse muscle atrophy of bilateral upper and no extremities    Pulses: Pulses palpable and equal bilaterally.   Skin: No bleeding or  Bruising, her pedal take rash over the left arm and shoulder    Lymph nodes: No palpable adenopathy.   Neurologic: Alert and oriented x 2. Moves all four limbs equally. No tremors. No facial asymetry.     Pertinent Lab Results:       Results from last 7 days  Lab Units 08/22/18  0612 08/21/18  0612 08/20/18  1658 08/20/18  1200  08/19/18  0806 08/18/18  1338  08/16/18  0511   SODIUM mmol/L 128* 124* 124* 127*  < > 125* 126*  < > 132*   POTASSIUM mmol/L 4.1 3.8  --  4.3  < > 3.8 4.1  < > 2.7*   CHLORIDE mmol/L 91* 90*  --  96*  < > 95* 96*  < > 92*   CO2 mmol/L 26.0 24.0*  --  23.0*  < > 24.0* 23.0*  < > 31.0*   BUN mg/dL 25* 18  --  15  < > 10 13  < > 14   CREATININE mg/dL 0.80 0.70  --  0.60  < > 0.60 0.80  < > 0.60   CALCIUM mg/dL 9.0 8.5  --  8.5  < > 8.2* 8.0*  < > 8.3*   BILIRUBIN mg/dL  --   --   --   --   --  0.5 0.2  --  0.4   ALK PHOS U/L  --   --   --   --   --  70 66  --  84   ALT (SGPT) U/L  --   --   --   --   --  28 27  --  32   AST (SGOT) U/L  --   --   --   --   --  30 30  --  44   GLUCOSE mg/dL 118* 104*  --  107*  < > 95 159*  < > 119*   < > = values in this interval not displayed.    Results from last 7 days  Lab Units 08/19/18  0806 08/18/18  0546 08/17/18  0517   WBC 10*3/mm3 11.68* 11.90* 10.28   HEMOGLOBIN g/dL 11.6* 12.5 13.2   HEMATOCRIT % 34.4* 37.7 40.2   PLATELETS 10*3/mm3 320 314 321       Results from last 7 days  Lab Units 08/17/18  0517   INR  1.14*                               Invalid input(s): USDES,  BLOODU, NITRITITE, BACT, EP  Pain Management Panel     There is no flowsheet data to display.              Pertinent Radiology Results:    Imaging Results (all)     Procedure Component Value Units Date/Time    XR Foot 3+ View Right [[id number]] Collected:  08/21/18 1533     Updated:  08/21/18  1535    Narrative:       PROCEDURE: XR FOOT 3+ VW RIGHT-     History: plantar wound; N39.0-Urinary tract infection, site not  specified; I62.00-Nontraumatic subdural hemorrhage, unspecified;  Z74.09-Other reduced mobility     COMPARISON: None.     FINDINGS:  A 3 view exam demonstrates no acute fracture or dislocation.  The joint spaces are preserved. Note is made of a wound in the plantar  soft tissues.           Impression:       No radiographic evidence of osteomyelitis.                 This report was finalized on 8/21/2018 3:33 PM by Christiano Umanzor M.D..    XR Chest 1 View [[id number]] Collected:  08/19/18 1601     Updated:  08/20/18 1458    Narrative:       FINAL REPORT    TECHNIQUE:  Single view chest    CLINICAL HISTORY:  cough and congestion, possible fluid overload or pneumonia    FINDINGS:  A single view of the chest was obtained and compared to prior  exam from August 14, 2018 .  The heart size is stable.  The  mediastinum is unremarkable.  There is a left lower lobe opacity  likely secondary to pneumonia.  There is no pneumothorax.  Osseous structures are unremarkable.      Impression:       Left lower lobe opacity likely secondary to pneumonia.    Authenticated by Tavo Landry on 08/19/2018 04:01:18 PM    CT Head Without Contrast [[id number]] Collected:  08/14/18 1717     Updated:  08/20/18 1453    Narrative:       FINAL REPORT    TECHNIQUE:  Multiple contiguous transaxial slices through the head were  obtained without the intravenous administration of contrast.    CLINICAL HISTORY:  Altered mental status    FINDINGS:  There is an acute high density subdural hematoma along the falx  posteriorly measuring 5 mm in greatest dimension.  There is no  mass effect or shift.  There is age-appropriate cortical atrophy  with associated ventricular enlargement. Patchy periventricular  white matter low attenuation is most consistent with chronic  microvascular ischemia. There is no acute infarct or  mass  effect. If concern persists, recommend MRI. There is no sinus  air-fluid level. The calvarium is intact.      Impression:       Subdural hematoma along the falx.  Recommend follow-up CT in  12-24 hours  Atrophy with chronic microvascular ischemia    Authenticated by Katy Wong MD on 08/14/2018 05:17:55 PM    CT Head Without Contrast [[id number]] Collected:  08/18/18 0908     Updated:  08/18/18 0912    Narrative:       PROCEDURE: CT HEAD WO CONTRAST-     HISTORY: Sub-dural hemorrhage     COMPARISON:  None .     TECHNIQUE: Multiple axial CT images were performed from the foramen  magnum to the vertex without enhancement.      FINDINGS: The previously described left parafalcine subdural hemorrhage  is much less conspicuous on today's exam and is not readily appreciable.  Cerebral atrophy is noted. Remote injury versus prominent perivascular  space in the left basal ganglia. Atherosclerosis is present. There is no  mass, mass effect or midline shift.  There is no hydrocephalus. The  paranasal sinuses are clear. Bone windows reveal no acute osseous  abnormalities.       Impression:       The previously described left parafalcine subdural  hemorrhage is much less conspicuous on today's exam and is not readily  appreciable.     Continued follow-up recommended             742.64 mGy.cm          This study was performed with techniques to keep radiation doses as low  as reasonably achievable (ALARA). Individualized dose reduction  techniques using automated exposure control or adjustment of mA and/or  kV according to the patient size were employed.      This report was finalized on 8/18/2018 9:10 AM by Tavo Landry DO.    CT Head Without Contrast [[id number]] Collected:  08/15/18 1001     Updated:  08/15/18 1011    Narrative:       CT HEAD WITHOUT CONTRAST-     HISTORY: Eval status of subdural hematoma; N39.0-Urinary tract  infection, site not specified; I62.00-Nontraumatic subdural hemorrhage,  unspecified.      TECHNIQUE: Contiguous axial images were obtained from the skull vertex  to the skull base without administration of contrast.     FINDINGS: Comparison with 08/14/2018. Again identified is a left  parafalcine subdural hematoma superiorly. This measures 5 mm in maximal  thickness which is unchanged from previous. No other areas of new  hemorrhage compared to previous. No mass effect or midline shift. The  basal cisterns are patent. Ventricles are not enlarged. Diffuse cortical  volume loss and nonspecific white matter changes, likely related to  chronic microvascular ischemia. Prominent vascular calcifications.  Mandibular condyles are subluxed inferiorly, probably positional but  should be correlated clinically. Faint opacification of the left mastoid  air cell base.       Impression:       No significant change in the small left parafalcine subdural  hematoma. Additional follow-up recommended.                       This study was performed with techniques to keep radiation doses as low  as reasonably achievable (ALARA). Individualized dose reduction  techniques using automated exposure control or adjustment of mA and/or  kV according to the patient size were employed.      This report was finalized on 8/15/2018 10:09 AM by Perfecto Diaz MD.    XR Chest 1 View [[id number]] Collected:  08/15/18 0913     Updated:  08/15/18 0916    Narrative:       SINGLE VIEW CHEST     INDICATION: Altered mental status.     FINDINGS: Single frontal view of the chest without comparison. EKG leads  overlie the chest. Probable significant kyphosis. Heart size is normal.  Aorta is ectatic and partially calcified. No pneumothorax. Areas of  scarring and atelectasis involve the lung bases. Prominent degenerative  changes in the spine and shoulders.       Impression:       Atelectasis and/or scarring in the lung bases. When  clinically feasible, short-term follow-up PA and lateral views would be  useful for better evaluation.     This report  was finalized on 8/15/2018 9:14 AM by Perfecto Diaz MD.          Condition on Discharge:      Stable.    Code status during the hospital stay:    Code Status and Medical Interventions:   Ordered at: 08/14/18 1932     Limited Support to NOT Include:    Intubation     Code Status:    No CPR     Medical Interventions (Level of Support Prior to Arrest):    Limited       Discharge Disposition:    Skilled Nursing Facility (DC - External)    Discharge Medications:       Discharge Medications      New Medications      Instructions Start Date   acyclovir 200 MG/5ML suspension  Commonly known as:  ZOVIRAX   400 mg, Oral, 3 Times Daily      cefdinir 300 MG capsule  Commonly known as:  OMNICEF   300 mg, Oral, Every 12 Hours Scheduled      furosemide 20 MG tablet  Commonly known as:  LASIX   20 mg, Oral, Daily      hydrocortisone 1 % cream   Topical, Every 8 Hours Scheduled      ipratropium-albuterol 0.5-2.5 mg/3 ml nebulizer  Commonly known as:  DUO-NEB   3 mL, Nebulization, Every 4 Hours PRN      losartan 25 MG tablet  Commonly known as:  COZAAR   25 mg, Oral, Every 24 Hours Scheduled      magnesium oxide 400 (241.3 Mg) MG tablet tablet  Commonly known as:  MAGOX   400 mg, Oral, 3 Times Daily      megestrol acetate 400 MG/10ML suspension oral suspension  Commonly known as:  MEGACE   400 mg, Oral, 2 Times Daily      metoprolol tartrate 25 MG tablet  Commonly known as:  LOPRESSOR   12.5 mg, Oral, Every 12 Hours Scheduled      potassium chloride 20 MEQ packet  Commonly known as:  KLOR-CON   20 mEq, Oral, Daily      sodium chloride 1 g tablet   1 g, Oral, 3 Times Daily With Meals         Continue These Medications      Instructions Start Date   aspirin 81 MG EC tablet   81 mg, Oral, Daily      docusate sodium 250 MG capsule  Commonly known as:  COLACE   250 mg, Oral, 2 Times Daily PRN      HYDROcodone-acetaminophen 5-325 MG per tablet  Commonly known as:  LORTAB   1 tablet, Oral, Every 8 Hours PRN      levothyroxine 112 MCG  tablet  Commonly known as:  SYNTHROID, LEVOTHROID   112 mcg, Oral, Daily      MULTIPLE VITAMINS PO   Oral, Daily      pantoprazole 40 MG EC tablet  Commonly known as:  PROTONIX   40 mg, Oral, Daily      polyethylene glycol packet  Commonly known as:  MIRALAX   17 g, Oral, Daily PRN      PROBIOTIC DAILY PO   1 tablet, Oral, Daily      RA VITAMIN B-12 1000 MCG tablet controlled-release  Generic drug:  Cyanocobalamin ER   Oral, Daily      tetrahydrozoline 0.05 % ophthalmic solution   1 drop, Both Eyes, Daily PRN         Stop These Medications    PARoxetine 20 MG tablet  Commonly known as:  PAXIL     raNITIdine 150 MG tablet  Commonly known as:  ZANTAC            Discharge Diet:     Diet Instructions     Diet: Cardiac       Discharge Diet:  Cardiac          Activity at Discharge:     Activity Instructions     Activity as Tolerated             Follow-up Appointments:    Additional Instructions for the Follow-ups that You Need to Schedule     Discharge Follow-up with PCP    As directed      Currently Documented PCP:  Nancy Morrison MD  PCP Phone Number:  None    Follow Up Details:  1 week            Contact information for follow-up providers     Nancy Morrison MD .    Specialty:  Family Medicine  Why:  1 week                 Contact information for after-discharge care     Destination     [facility] .    Specialty:  Skilled Nursing Facility  Contact information:  [address] [place] [address] [phone]                             Future Appointments  Date Time Provider Department Center   8/30/2018 2:30 PM Nestor Browning DO [facility] None       Additional Instructions for the Follow-ups that You Need to Schedule     Discharge Follow-up with PCP    As directed      Currently Documented PCP:  Nancy Morrison MD  PCP Phone Number:  None    Follow Up Details:  1 week               Test Results Pending at Discharge:     Order Current Status    Osmolality, Urine - Urine,  Catheter In process             Dominique Dobson MD  08/22/18  1:55 PM    Time spent: 32 minutes     Dictated utilizing Dragon dictation.      Electronically signed by Dominique Dobson MD at 8/22/2018  1:55 PM

## 2018-08-22 NOTE — PLAN OF CARE
Problem: Fall Risk (Adult)  Goal: Absence of Fall  Outcome: Ongoing (interventions implemented as appropriate)   08/22/18 4713   Fall Risk (Adult)   Absence of Fall making progress toward outcome

## 2018-08-22 NOTE — PROGRESS NOTES
Case Management Discharge Note         Destination - Selection Complete     Service Request Status Selected Specialties Address Phone Number Fax Number    [facility] Selected Skilled Nursing Facility [address] [place] [address] [phone]      Durable Medical Equipment     No service has been selected for the patient.      Dialysis/Infusion     No service has been selected for the patient.      Home Medical Care     No service has been selected for the patient.      Social Care     No service has been selected for the patient.        Other: Other (Private vehicle)    Final Discharge Disposition Code: 03 - skilled nursing facility (SNF)

## 2018-08-22 NOTE — PLAN OF CARE
Problem: Fall Risk (Adult)  Goal: Absence of Fall  Outcome: Ongoing (interventions implemented as appropriate)   08/22/18 1447   Fall Risk (Adult)   Absence of Fall making progress toward outcome

## 2018-09-03 PROBLEM — Z78.9 IMPAIRED MOBILITY AND ADLS: Status: ACTIVE | Noted: 2018-01-01

## 2018-09-03 PROBLEM — Z74.09 IMPAIRED MOBILITY AND ADLS: Status: ACTIVE | Noted: 2018-01-01
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
## 2018-10-20 NOTE — ED NOTES
Pt came from [facility] with 22g iv in right wrist for iv antibiotics. IV was leaking when I flushed it. I removed iv and placed a 20g in right anticcubital area      Mirta Schultz RN  10/20/18 1922
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
## 2018-12-14 PROBLEM — R13.11 ORAL PHASE DYSPHAGIA: Status: RESOLVED | Noted: 2018-01-01 | Resolved: 2018-01-01

## 2018-12-14 PROBLEM — K21.9 GASTROESOPHAGEAL REFLUX DISEASE WITHOUT ESOPHAGITIS: Status: ACTIVE | Noted: 2018-01-01
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
## 2018-12-30 PROBLEM — N17.9 ACUTE RENAL FAILURE (ARF) (HCC): Status: RESOLVED | Noted: 2018-01-01 | Resolved: 2018-01-01
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
## 2019-01-01 ENCOUNTER — HOSPITAL ENCOUNTER (INPATIENT)
Facility: HOSPITAL | Age: 84
LOS: 4 days | Discharge: NURSING, REHAB OR LONG-TERM CARE | End: 2019-01-19
Attending: EMERGENCY MEDICINE | Admitting: INTERNAL MEDICINE

## 2019-01-01 ENCOUNTER — APPOINTMENT (OUTPATIENT)
Dept: GENERAL RADIOLOGY | Facility: HOSPITAL | Age: 84
End: 2019-01-01

## 2019-01-01 ENCOUNTER — APPOINTMENT (OUTPATIENT)
Dept: CT IMAGING | Facility: HOSPITAL | Age: 84
End: 2019-01-01

## 2019-01-01 ENCOUNTER — APPOINTMENT (OUTPATIENT)
Dept: ULTRASOUND IMAGING | Facility: HOSPITAL | Age: 84
End: 2019-01-01

## 2019-01-01 VITALS
DIASTOLIC BLOOD PRESSURE: 74 MMHG | WEIGHT: 112.3 LBS | BODY MASS INDEX: 18.71 KG/M2 | HEIGHT: 65 IN | HEART RATE: 86 BPM | SYSTOLIC BLOOD PRESSURE: 127 MMHG | TEMPERATURE: 98 F | RESPIRATION RATE: 16 BRPM | OXYGEN SATURATION: 97 %

## 2019-01-01 DIAGNOSIS — L89.899 BILATERAL PRESSURE ULCER OF FEET: ICD-10-CM

## 2019-01-01 DIAGNOSIS — I73.9 PERIPHERAL VASCULAR DISEASE (HCC): ICD-10-CM

## 2019-01-01 DIAGNOSIS — J18.9 PNEUMONIA OF RIGHT LOWER LOBE DUE TO INFECTIOUS ORGANISM: Primary | ICD-10-CM

## 2019-01-01 LAB
ACINETOBACTER SCREEN CX: NO GROWTH
ALBUMIN SERPL-MCNC: 3.5 G/DL (ref 3.5–5)
ALBUMIN SERPL-MCNC: 3.7 G/DL (ref 3.5–5)
ALBUMIN/GLOB SERPL: 1.3 G/DL (ref 1–2)
ALP SERPL-CCNC: 77 U/L (ref 38–126)
ALT SERPL W P-5'-P-CCNC: 23 U/L (ref 13–69)
ANION GAP SERPL CALCULATED.3IONS-SCNC: 12.2 MMOL/L (ref 10–20)
ANION GAP SERPL CALCULATED.3IONS-SCNC: 13.1 MMOL/L (ref 10–20)
ANION GAP SERPL CALCULATED.3IONS-SCNC: 13.3 MMOL/L (ref 10–20)
ANION GAP SERPL CALCULATED.3IONS-SCNC: 14.8 MMOL/L (ref 10–20)
ANION GAP SERPL CALCULATED.3IONS-SCNC: 16.1 MMOL/L (ref 10–20)
APTT PPP: 25 SECONDS (ref 25–36)
AST SERPL-CCNC: 22 U/L (ref 15–46)
BACTERIA SPEC AEROBE CULT: ABNORMAL
BACTERIA SPEC AEROBE CULT: NORMAL
BACTERIA SPEC AEROBE CULT: NORMAL
BACTERIA UR QL AUTO: ABNORMAL /HPF
BASOPHILS # BLD AUTO: 0.06 10*3/MM3 (ref 0–0.2)
BASOPHILS # BLD AUTO: 0.08 10*3/MM3 (ref 0–0.2)
BASOPHILS # BLD AUTO: 0.08 10*3/MM3 (ref 0–0.2)
BASOPHILS # BLD AUTO: 0.12 10*3/MM3 (ref 0–0.2)
BASOPHILS # BLD AUTO: 0.12 10*3/MM3 (ref 0–0.2)
BASOPHILS NFR BLD AUTO: 0.4 % (ref 0–2.5)
BASOPHILS NFR BLD AUTO: 0.4 % (ref 0–2.5)
BASOPHILS NFR BLD AUTO: 0.5 % (ref 0–2.5)
BASOPHILS NFR BLD AUTO: 0.6 % (ref 0–2.5)
BASOPHILS NFR BLD AUTO: 0.7 % (ref 0–2.5)
BILIRUB SERPL-MCNC: 0.3 MG/DL (ref 0.2–1.3)
BILIRUB UR QL STRIP: NEGATIVE
BUN BLD-MCNC: 14 MG/DL (ref 7–20)
BUN BLD-MCNC: 15 MG/DL (ref 7–20)
BUN BLD-MCNC: 19 MG/DL (ref 7–20)
BUN BLD-MCNC: 25 MG/DL (ref 7–20)
BUN BLD-MCNC: 30 MG/DL (ref 7–20)
BUN/CREAT SERPL: 14 (ref 7.1–23.5)
BUN/CREAT SERPL: 16.7 (ref 7.1–23.5)
BUN/CREAT SERPL: 19 (ref 7.1–23.5)
BUN/CREAT SERPL: 20 (ref 7.1–23.5)
BUN/CREAT SERPL: 22.7 (ref 7.1–23.5)
CALCIUM SPEC-SCNC: 8.2 MG/DL (ref 8.4–10.2)
CALCIUM SPEC-SCNC: 8.3 MG/DL (ref 8.4–10.2)
CALCIUM SPEC-SCNC: 8.4 MG/DL (ref 8.4–10.2)
CALCIUM SPEC-SCNC: 8.9 MG/DL (ref 8.4–10.2)
CALCIUM SPEC-SCNC: 9.1 MG/DL (ref 8.4–10.2)
CHLORIDE SERPL-SCNC: 103 MMOL/L (ref 98–107)
CHLORIDE SERPL-SCNC: 105 MMOL/L (ref 98–107)
CHLORIDE SERPL-SCNC: 106 MMOL/L (ref 98–107)
CLARITY UR: ABNORMAL
CO2 SERPL-SCNC: 18 MMOL/L (ref 26–30)
CO2 SERPL-SCNC: 20 MMOL/L (ref 26–30)
COLOR UR: YELLOW
CREAT BLD-MCNC: 0.9 MG/DL (ref 0.6–1.3)
CREAT BLD-MCNC: 1 MG/DL (ref 0.6–1.3)
CREAT BLD-MCNC: 1 MG/DL (ref 0.6–1.3)
CREAT BLD-MCNC: 1.1 MG/DL (ref 0.6–1.3)
CREAT BLD-MCNC: 1.5 MG/DL (ref 0.6–1.3)
CRP SERPL-MCNC: 3.3 MG/DL (ref 0–1)
CRP SERPL-MCNC: 3.8 MG/DL (ref 0–1)
DEPRECATED RDW RBC AUTO: 44 FL (ref 37–54)
DEPRECATED RDW RBC AUTO: 47.5 FL (ref 37–54)
DEPRECATED RDW RBC AUTO: 47.9 FL (ref 37–54)
DEPRECATED RDW RBC AUTO: 47.9 FL (ref 37–54)
DEPRECATED RDW RBC AUTO: 49.1 FL (ref 37–54)
EOSINOPHIL # BLD AUTO: 0.08 10*3/MM3 (ref 0–0.7)
EOSINOPHIL # BLD AUTO: 0.15 10*3/MM3 (ref 0–0.7)
EOSINOPHIL # BLD AUTO: 0.25 10*3/MM3 (ref 0–0.7)
EOSINOPHIL # BLD AUTO: 0.29 10*3/MM3 (ref 0–0.7)
EOSINOPHIL # BLD AUTO: 0.35 10*3/MM3 (ref 0–0.7)
EOSINOPHIL NFR BLD AUTO: 0.6 % (ref 0–7)
EOSINOPHIL NFR BLD AUTO: 0.7 % (ref 0–7)
EOSINOPHIL NFR BLD AUTO: 1.4 % (ref 0–7)
EOSINOPHIL NFR BLD AUTO: 1.6 % (ref 0–7)
EOSINOPHIL NFR BLD AUTO: 2.3 % (ref 0–7)
ERYTHROCYTE [DISTWIDTH] IN BLOOD BY AUTOMATED COUNT: 12.7 % (ref 11.5–14.5)
ERYTHROCYTE [DISTWIDTH] IN BLOOD BY AUTOMATED COUNT: 13.1 % (ref 11.5–14.5)
ERYTHROCYTE [DISTWIDTH] IN BLOOD BY AUTOMATED COUNT: 13.2 % (ref 11.5–14.5)
GFR SERPL CREATININE-BSD FRML MDRD: 32 ML/MIN/1.73
GFR SERPL CREATININE-BSD FRML MDRD: 46 ML/MIN/1.73
GFR SERPL CREATININE-BSD FRML MDRD: 52 ML/MIN/1.73
GFR SERPL CREATININE-BSD FRML MDRD: 52 ML/MIN/1.73
GFR SERPL CREATININE-BSD FRML MDRD: 58 ML/MIN/1.73
GLOBULIN UR ELPH-MCNC: 2.9 GM/DL
GLUCOSE BLD-MCNC: 101 MG/DL (ref 74–98)
GLUCOSE BLD-MCNC: 106 MG/DL (ref 74–98)
GLUCOSE BLD-MCNC: 121 MG/DL (ref 74–98)
GLUCOSE BLD-MCNC: 86 MG/DL (ref 74–98)
GLUCOSE BLD-MCNC: 88 MG/DL (ref 74–98)
GLUCOSE UR STRIP-MCNC: NEGATIVE MG/DL
HCT VFR BLD AUTO: 23.8 % (ref 37–47)
HCT VFR BLD AUTO: 24.8 % (ref 37–47)
HCT VFR BLD AUTO: 24.9 % (ref 37–47)
HCT VFR BLD AUTO: 26.9 % (ref 37–47)
HCT VFR BLD AUTO: 30.2 % (ref 37–47)
HGB BLD-MCNC: 7.5 G/DL (ref 12–16)
HGB BLD-MCNC: 7.8 G/DL (ref 12–16)
HGB BLD-MCNC: 8 G/DL (ref 12–16)
HGB BLD-MCNC: 8.9 G/DL (ref 12–16)
HGB BLD-MCNC: 9.7 G/DL (ref 12–16)
HGB UR QL STRIP.AUTO: NEGATIVE
HYALINE CASTS UR QL AUTO: ABNORMAL /LPF
IMM GRANULOCYTES # BLD AUTO: 0.38 10*3/MM3 (ref 0–0.06)
IMM GRANULOCYTES # BLD AUTO: 0.38 10*3/MM3 (ref 0–0.06)
IMM GRANULOCYTES # BLD AUTO: 0.45 10*3/MM3 (ref 0–0.06)
IMM GRANULOCYTES # BLD AUTO: 0.48 10*3/MM3 (ref 0–0.06)
IMM GRANULOCYTES # BLD AUTO: 0.77 10*3/MM3 (ref 0–0.06)
IMM GRANULOCYTES NFR BLD AUTO: 2.1 % (ref 0–0.6)
IMM GRANULOCYTES NFR BLD AUTO: 2.5 % (ref 0–0.6)
IMM GRANULOCYTES NFR BLD AUTO: 2.7 % (ref 0–0.6)
IMM GRANULOCYTES NFR BLD AUTO: 3.3 % (ref 0–0.6)
IMM GRANULOCYTES NFR BLD AUTO: 3.8 % (ref 0–0.6)
INR PPP: 1.41 (ref 0.9–1.1)
IRON 24H UR-MRATE: 16 MCG/DL (ref 37–181)
IRON SATN MFR SERPL: 8 % (ref 11–46)
KETONES UR QL STRIP: NEGATIVE
LEUKOCYTE ESTERASE UR QL STRIP.AUTO: NEGATIVE
LYMPHOCYTES # BLD AUTO: 1.55 10*3/MM3 (ref 0.6–3.4)
LYMPHOCYTES # BLD AUTO: 1.58 10*3/MM3 (ref 0.6–3.4)
LYMPHOCYTES # BLD AUTO: 1.67 10*3/MM3 (ref 0.6–3.4)
LYMPHOCYTES # BLD AUTO: 1.86 10*3/MM3 (ref 0.6–3.4)
LYMPHOCYTES # BLD AUTO: 2.16 10*3/MM3 (ref 0.6–3.4)
LYMPHOCYTES NFR BLD AUTO: 10.2 % (ref 10–50)
LYMPHOCYTES NFR BLD AUTO: 11.5 % (ref 10–50)
LYMPHOCYTES NFR BLD AUTO: 12.1 % (ref 10–50)
LYMPHOCYTES NFR BLD AUTO: 9.1 % (ref 10–50)
LYMPHOCYTES NFR BLD AUTO: 9.4 % (ref 10–50)
MAGNESIUM SERPL-MCNC: 1.9 MG/DL (ref 1.6–2.3)
MAGNESIUM SERPL-MCNC: 2.1 MG/DL (ref 1.6–2.3)
MCH RBC QN AUTO: 31.3 PG (ref 27–31)
MCH RBC QN AUTO: 31.7 PG (ref 27–31)
MCH RBC QN AUTO: 31.9 PG (ref 27–31)
MCH RBC QN AUTO: 32.1 PG (ref 27–31)
MCH RBC QN AUTO: 32.4 PG (ref 27–31)
MCHC RBC AUTO-ENTMCNC: 31.5 G/DL (ref 30–37)
MCHC RBC AUTO-ENTMCNC: 31.5 G/DL (ref 30–37)
MCHC RBC AUTO-ENTMCNC: 32.1 G/DL (ref 30–37)
MCHC RBC AUTO-ENTMCNC: 32.1 G/DL (ref 30–37)
MCHC RBC AUTO-ENTMCNC: 33.1 G/DL (ref 30–37)
MCV RBC AUTO: 101 FL (ref 81–99)
MCV RBC AUTO: 101.7 FL (ref 81–99)
MCV RBC AUTO: 95.7 FL (ref 81–99)
MCV RBC AUTO: 99.2 FL (ref 81–99)
MCV RBC AUTO: 99.6 FL (ref 81–99)
MONOCYTES # BLD AUTO: 0.98 10*3/MM3 (ref 0–0.9)
MONOCYTES # BLD AUTO: 1.03 10*3/MM3 (ref 0–0.9)
MONOCYTES # BLD AUTO: 1.16 10*3/MM3 (ref 0–0.9)
MONOCYTES # BLD AUTO: 1.31 10*3/MM3 (ref 0–0.9)
MONOCYTES # BLD AUTO: 1.46 10*3/MM3 (ref 0–0.9)
MONOCYTES NFR BLD AUTO: 5.5 % (ref 0–12)
MONOCYTES NFR BLD AUTO: 6.4 % (ref 0–12)
MONOCYTES NFR BLD AUTO: 7.5 % (ref 0–12)
MONOCYTES NFR BLD AUTO: 7.6 % (ref 0–12)
MONOCYTES NFR BLD AUTO: 8.2 % (ref 0–12)
MRSA SPEC QL CULT: NORMAL
NEUTROPHILS # BLD AUTO: 10.55 10*3/MM3 (ref 2–6.9)
NEUTROPHILS # BLD AUTO: 11.7 10*3/MM3 (ref 2–6.9)
NEUTROPHILS # BLD AUTO: 13.5 10*3/MM3 (ref 2–6.9)
NEUTROPHILS # BLD AUTO: 14.2 10*3/MM3 (ref 2–6.9)
NEUTROPHILS # BLD AUTO: 16.31 10*3/MM3 (ref 2–6.9)
NEUTROPHILS NFR BLD AUTO: 75.8 % (ref 37–80)
NEUTROPHILS NFR BLD AUTO: 76.7 % (ref 37–80)
NEUTROPHILS NFR BLD AUTO: 76.9 % (ref 37–80)
NEUTROPHILS NFR BLD AUTO: 79.4 % (ref 37–80)
NEUTROPHILS NFR BLD AUTO: 80.1 % (ref 37–80)
NITRITE UR QL STRIP: POSITIVE
NRBC BLD AUTO-RTO: 0 /100 WBC (ref 0–0)
PH UR STRIP.AUTO: 6 [PH] (ref 5–8)
PHOSPHATE SERPL-MCNC: 2.8 MG/DL (ref 2.5–4.5)
PLATELET # BLD AUTO: 437 10*3/MM3 (ref 130–400)
PLATELET # BLD AUTO: 466 10*3/MM3 (ref 130–400)
PLATELET # BLD AUTO: 484 10*3/MM3 (ref 130–400)
PLATELET # BLD AUTO: 534 10*3/MM3 (ref 130–400)
PLATELET # BLD AUTO: 569 10*3/MM3 (ref 130–400)
PMV BLD AUTO: 10.4 FL (ref 6–12)
PMV BLD AUTO: 9.5 FL (ref 6–12)
PMV BLD AUTO: 9.8 FL (ref 6–12)
PMV BLD AUTO: 9.9 FL (ref 6–12)
PMV BLD AUTO: 9.9 FL (ref 6–12)
POTASSIUM BLD-SCNC: 4.1 MMOL/L (ref 3.5–5.1)
POTASSIUM BLD-SCNC: 4.2 MMOL/L (ref 3.5–5.1)
POTASSIUM BLD-SCNC: 4.3 MMOL/L (ref 3.5–5.1)
POTASSIUM BLD-SCNC: 4.8 MMOL/L (ref 3.5–5.1)
POTASSIUM BLD-SCNC: 5.1 MMOL/L (ref 3.5–5.1)
PROT SERPL-MCNC: 6.6 G/DL (ref 6.3–8.2)
PROT UR QL STRIP: NEGATIVE
PROTHROMBIN TIME: 15.6 SECONDS (ref 9.3–12.1)
RBC # BLD AUTO: 2.34 10*6/MM3 (ref 4.2–5.4)
RBC # BLD AUTO: 2.49 10*6/MM3 (ref 4.2–5.4)
RBC # BLD AUTO: 2.51 10*6/MM3 (ref 4.2–5.4)
RBC # BLD AUTO: 2.81 10*6/MM3 (ref 4.2–5.4)
RBC # BLD AUTO: 2.99 10*6/MM3 (ref 4.2–5.4)
RBC # UR: ABNORMAL /HPF
REF LAB TEST METHOD: ABNORMAL
SODIUM BLD-SCNC: 132 MMOL/L (ref 137–145)
SODIUM BLD-SCNC: 132 MMOL/L (ref 137–145)
SODIUM BLD-SCNC: 133 MMOL/L (ref 137–145)
SODIUM BLD-SCNC: 134 MMOL/L (ref 137–145)
SODIUM BLD-SCNC: 134 MMOL/L (ref 137–145)
SP GR UR STRIP: 1.01 (ref 1–1.03)
SQUAMOUS #/AREA URNS HPF: ABNORMAL /HPF
TIBC SERPL-MCNC: 192 MCG/DL (ref 261–497)
TSH SERPL DL<=0.05 MIU/L-ACNC: 8.34 MIU/ML (ref 0.47–4.68)
UROBILINOGEN UR QL STRIP: ABNORMAL
VANCOMYCIN TROUGH SERPL-MCNC: 5.62 MCG/ML (ref 5–15)
VRE SPEC QL CULT: NORMAL
WBC NRBC COR # BLD: 13.75 10*3/MM3 (ref 4.8–10.8)
WBC NRBC COR # BLD: 15.22 10*3/MM3 (ref 4.8–10.8)
WBC NRBC COR # BLD: 17.74 10*3/MM3 (ref 4.8–10.8)
WBC NRBC COR # BLD: 17.83 10*3/MM3 (ref 4.8–10.8)
WBC NRBC COR # BLD: 20.52 10*3/MM3 (ref 4.8–10.8)
WBC UR QL AUTO: ABNORMAL /HPF

## 2019-01-01 PROCEDURE — 86140 C-REACTIVE PROTEIN: CPT | Performed by: INTERNAL MEDICINE

## 2019-01-01 PROCEDURE — 76775 US EXAM ABDO BACK WALL LIM: CPT

## 2019-01-01 PROCEDURE — 25010000002 ONDANSETRON PER 1 MG: Performed by: FAMILY MEDICINE

## 2019-01-01 PROCEDURE — 81001 URINALYSIS AUTO W/SCOPE: CPT | Performed by: PHYSICIAN ASSISTANT

## 2019-01-01 PROCEDURE — 73700 CT LOWER EXTREMITY W/O DYE: CPT

## 2019-01-01 PROCEDURE — 25010000002 LEVOFLOXACIN PER 250 MG: Performed by: PHYSICIAN ASSISTANT

## 2019-01-01 PROCEDURE — 25010000002 CEFEPIME PER 500 MG: Performed by: PHYSICIAN ASSISTANT

## 2019-01-01 PROCEDURE — 25010000002 HALOPERIDOL LACTATE PER 5 MG: Performed by: FAMILY MEDICINE

## 2019-01-01 PROCEDURE — 80069 RENAL FUNCTION PANEL: CPT | Performed by: INTERNAL MEDICINE

## 2019-01-01 PROCEDURE — 83735 ASSAY OF MAGNESIUM: CPT | Performed by: FAMILY MEDICINE

## 2019-01-01 PROCEDURE — P9612 CATHETERIZE FOR URINE SPEC: HCPCS

## 2019-01-01 PROCEDURE — 85025 COMPLETE CBC W/AUTO DIFF WBC: CPT | Performed by: NURSE PRACTITIONER

## 2019-01-01 PROCEDURE — 87186 SC STD MICRODIL/AGAR DIL: CPT | Performed by: FAMILY MEDICINE

## 2019-01-01 PROCEDURE — 80202 ASSAY OF VANCOMYCIN: CPT | Performed by: FAMILY MEDICINE

## 2019-01-01 PROCEDURE — 85025 COMPLETE CBC W/AUTO DIFF WBC: CPT | Performed by: FAMILY MEDICINE

## 2019-01-01 PROCEDURE — 99232 SBSQ HOSP IP/OBS MODERATE 35: CPT | Performed by: NURSE PRACTITIONER

## 2019-01-01 PROCEDURE — 80048 BASIC METABOLIC PNL TOTAL CA: CPT | Performed by: FAMILY MEDICINE

## 2019-01-01 PROCEDURE — 99497 ADVNCD CARE PLAN 30 MIN: CPT | Performed by: INTERNAL MEDICINE

## 2019-01-01 PROCEDURE — 80053 COMPREHEN METABOLIC PANEL: CPT | Performed by: PHYSICIAN ASSISTANT

## 2019-01-01 PROCEDURE — 25010000002 ENOXAPARIN PER 10 MG: Performed by: FAMILY MEDICINE

## 2019-01-01 PROCEDURE — 85025 COMPLETE CBC W/AUTO DIFF WBC: CPT | Performed by: PHYSICIAN ASSISTANT

## 2019-01-01 PROCEDURE — 99232 SBSQ HOSP IP/OBS MODERATE 35: CPT | Performed by: INTERNAL MEDICINE

## 2019-01-01 PROCEDURE — 99222 1ST HOSP IP/OBS MODERATE 55: CPT | Performed by: FAMILY MEDICINE

## 2019-01-01 PROCEDURE — 25010000002 MORPHINE PER 10 MG: Performed by: FAMILY MEDICINE

## 2019-01-01 PROCEDURE — 83550 IRON BINDING TEST: CPT | Performed by: INTERNAL MEDICINE

## 2019-01-01 PROCEDURE — 87070 CULTURE OTHR SPECIMN AEROBIC: CPT | Performed by: FAMILY MEDICINE

## 2019-01-01 PROCEDURE — 94799 UNLISTED PULMONARY SVC/PX: CPT

## 2019-01-01 PROCEDURE — 25010000002 LORAZEPAM PER 2 MG: Performed by: FAMILY MEDICINE

## 2019-01-01 PROCEDURE — 83540 ASSAY OF IRON: CPT | Performed by: INTERNAL MEDICINE

## 2019-01-01 PROCEDURE — 99284 EMERGENCY DEPT VISIT MOD MDM: CPT

## 2019-01-01 PROCEDURE — 93005 ELECTROCARDIOGRAM TRACING: CPT | Performed by: PHYSICIAN ASSISTANT

## 2019-01-01 PROCEDURE — 73630 X-RAY EXAM OF FOOT: CPT

## 2019-01-01 PROCEDURE — 87081 CULTURE SCREEN ONLY: CPT | Performed by: FAMILY MEDICINE

## 2019-01-01 PROCEDURE — 80048 BASIC METABOLIC PNL TOTAL CA: CPT | Performed by: NURSE PRACTITIONER

## 2019-01-01 PROCEDURE — 99238 HOSP IP/OBS DSCHRG MGMT 30/<: CPT | Performed by: INTERNAL MEDICINE

## 2019-01-01 PROCEDURE — 25010000002 CEFTRIAXONE SODIUM-DEXTROSE 2-2.22 GM-%(50ML) RECONSTITUTED SOLUTION: Performed by: FAMILY MEDICINE

## 2019-01-01 PROCEDURE — 87147 CULTURE TYPE IMMUNOLOGIC: CPT | Performed by: FAMILY MEDICINE

## 2019-01-01 PROCEDURE — 36415 COLL VENOUS BLD VENIPUNCTURE: CPT | Performed by: PHYSICIAN ASSISTANT

## 2019-01-01 PROCEDURE — 85730 THROMBOPLASTIN TIME PARTIAL: CPT | Performed by: PHYSICIAN ASSISTANT

## 2019-01-01 PROCEDURE — 94640 AIRWAY INHALATION TREATMENT: CPT

## 2019-01-01 PROCEDURE — 71250 CT THORAX DX C-: CPT

## 2019-01-01 PROCEDURE — 25010000002 LORAZEPAM PER 2 MG: Performed by: INTERNAL MEDICINE

## 2019-01-01 PROCEDURE — 87077 CULTURE AEROBIC IDENTIFY: CPT | Performed by: FAMILY MEDICINE

## 2019-01-01 PROCEDURE — 85610 PROTHROMBIN TIME: CPT | Performed by: PHYSICIAN ASSISTANT

## 2019-01-01 PROCEDURE — 71045 X-RAY EXAM CHEST 1 VIEW: CPT

## 2019-01-01 PROCEDURE — 25010000002 VANCOMYCIN 1 G RECONSTITUTED SOLUTION 1 EACH VIAL: Performed by: PHYSICIAN ASSISTANT

## 2019-01-01 PROCEDURE — 87040 BLOOD CULTURE FOR BACTERIA: CPT | Performed by: PHYSICIAN ASSISTANT

## 2019-01-01 PROCEDURE — 99498 ADVNCD CARE PLAN ADDL 30 MIN: CPT | Performed by: INTERNAL MEDICINE

## 2019-01-01 PROCEDURE — 25010000002 VANCOMYCIN 1 G RECONSTITUTED SOLUTION 1 EACH VIAL: Performed by: FAMILY MEDICINE

## 2019-01-01 PROCEDURE — 83735 ASSAY OF MAGNESIUM: CPT | Performed by: INTERNAL MEDICINE

## 2019-01-01 PROCEDURE — 85025 COMPLETE CBC W/AUTO DIFF WBC: CPT | Performed by: INTERNAL MEDICINE

## 2019-01-01 PROCEDURE — 84443 ASSAY THYROID STIM HORMONE: CPT | Performed by: FAMILY MEDICINE

## 2019-01-01 RX ORDER — HEPARIN SODIUM 10000 [USP'U]/100ML
12 INJECTION, SOLUTION INTRAVENOUS
Status: DISCONTINUED | OUTPATIENT
Start: 2019-01-01 | End: 2019-01-01

## 2019-01-01 RX ORDER — OXAZEPAM 10 MG
10 CAPSULE ORAL NIGHTLY PRN
COMMUNITY
End: 2019-01-01 | Stop reason: HOSPADM

## 2019-01-01 RX ORDER — MEGESTROL ACETATE 40 MG/ML
400 SUSPENSION ORAL 2 TIMES DAILY
Status: DISCONTINUED | OUTPATIENT
Start: 2019-01-01 | End: 2019-01-01 | Stop reason: HOSPADM

## 2019-01-01 RX ORDER — FUROSEMIDE 20 MG/1
20 TABLET ORAL EVERY OTHER DAY
Status: DISCONTINUED | OUTPATIENT
Start: 2019-01-01 | End: 2019-01-01

## 2019-01-01 RX ORDER — LORAZEPAM 2 MG/ML
0.5 INJECTION INTRAMUSCULAR ONCE AS NEEDED
Status: DISCONTINUED | OUTPATIENT
Start: 2019-01-01 | End: 2019-01-01

## 2019-01-01 RX ORDER — CILOSTAZOL 100 MG/1
50 TABLET ORAL 2 TIMES DAILY
Status: DISCONTINUED | OUTPATIENT
Start: 2019-01-01 | End: 2019-01-01 | Stop reason: HOSPADM

## 2019-01-01 RX ORDER — ACETAMINOPHEN 325 MG/1
650 TABLET ORAL EVERY 4 HOURS PRN
Status: DISCONTINUED | OUTPATIENT
Start: 2019-01-01 | End: 2019-01-01 | Stop reason: HOSPADM

## 2019-01-01 RX ORDER — MORPHINE SULFATE 100 MG/5ML
5 SOLUTION ORAL
Qty: 30 ML | Refills: 0 | Status: SHIPPED | OUTPATIENT
Start: 2019-01-01 | End: 2019-01-01 | Stop reason: SDUPTHER

## 2019-01-01 RX ORDER — ATORVASTATIN CALCIUM 40 MG/1
40 TABLET, FILM COATED ORAL NIGHTLY
Status: DISCONTINUED | OUTPATIENT
Start: 2019-01-01 | End: 2019-01-01 | Stop reason: HOSPADM

## 2019-01-01 RX ORDER — HYDROCODONE BITARTRATE AND ACETAMINOPHEN 5; 325 MG/1; MG/1
1 TABLET ORAL EVERY 6 HOURS
Status: DISCONTINUED | OUTPATIENT
Start: 2019-01-01 | End: 2019-01-01 | Stop reason: HOSPADM

## 2019-01-01 RX ORDER — MORPHINE SULFATE 100 MG/5ML
5 SOLUTION ORAL
Qty: 30 ML | Refills: 0 | Status: SHIPPED | OUTPATIENT
Start: 2019-01-01

## 2019-01-01 RX ORDER — NALOXONE HCL 0.4 MG/ML
0.4 VIAL (ML) INJECTION
Status: DISCONTINUED | OUTPATIENT
Start: 2019-01-01 | End: 2019-01-01 | Stop reason: HOSPADM

## 2019-01-01 RX ORDER — CILOSTAZOL 50 MG/1
50 TABLET ORAL 2 TIMES DAILY
Start: 2019-01-01

## 2019-01-01 RX ORDER — FUROSEMIDE 20 MG/1
20 TABLET ORAL DAILY
Status: DISCONTINUED | OUTPATIENT
Start: 2019-01-01 | End: 2019-01-01

## 2019-01-01 RX ORDER — HYDROCODONE BITARTRATE AND ACETAMINOPHEN 5; 325 MG/1; MG/1
1 TABLET ORAL EVERY 6 HOURS
Qty: 12 TABLET | Refills: 0 | Status: SHIPPED | OUTPATIENT
Start: 2019-01-01

## 2019-01-01 RX ORDER — DEXTROSE AND SODIUM CHLORIDE 5; .45 G/100ML; G/100ML
75 INJECTION, SOLUTION INTRAVENOUS CONTINUOUS
Status: DISCONTINUED | OUTPATIENT
Start: 2019-01-01 | End: 2019-01-01

## 2019-01-01 RX ORDER — IPRATROPIUM BROMIDE AND ALBUTEROL SULFATE 2.5; .5 MG/3ML; MG/3ML
3 SOLUTION RESPIRATORY (INHALATION) EVERY 4 HOURS PRN
Status: DISCONTINUED | OUTPATIENT
Start: 2019-01-01 | End: 2019-01-01 | Stop reason: HOSPADM

## 2019-01-01 RX ORDER — IPRATROPIUM BROMIDE AND ALBUTEROL SULFATE 2.5; .5 MG/3ML; MG/3ML
3 SOLUTION RESPIRATORY (INHALATION)
Status: DISCONTINUED | OUTPATIENT
Start: 2019-01-01 | End: 2019-01-01

## 2019-01-01 RX ORDER — SODIUM CHLORIDE 0.9 % (FLUSH) 0.9 %
3-10 SYRINGE (ML) INJECTION AS NEEDED
Status: DISCONTINUED | OUTPATIENT
Start: 2019-01-01 | End: 2019-01-01 | Stop reason: HOSPADM

## 2019-01-01 RX ORDER — LOSARTAN POTASSIUM 25 MG/1
25 TABLET ORAL
Status: DISCONTINUED | OUTPATIENT
Start: 2019-01-01 | End: 2019-01-01

## 2019-01-01 RX ORDER — SODIUM CHLORIDE 1000 MG
1 TABLET, SOLUBLE MISCELLANEOUS
Status: DISCONTINUED | OUTPATIENT
Start: 2019-01-01 | End: 2019-01-01 | Stop reason: HOSPADM

## 2019-01-01 RX ORDER — QUETIAPINE FUMARATE 25 MG/1
25 TABLET, FILM COATED ORAL NIGHTLY
Status: DISCONTINUED | OUTPATIENT
Start: 2019-01-01 | End: 2019-01-01

## 2019-01-01 RX ORDER — LIDOCAINE 50 MG/G
1 PATCH TOPICAL EVERY 24 HOURS
COMMUNITY
End: 2019-01-01 | Stop reason: HOSPADM

## 2019-01-01 RX ORDER — DOXYCYCLINE 100 MG/1
100 CAPSULE ORAL EVERY 12 HOURS SCHEDULED
Qty: 13 CAPSULE | Refills: 0
Start: 2019-01-01 | End: 2019-01-01

## 2019-01-01 RX ORDER — IRON POLYSACCHARIDE COMPLEX 150 MG
150 CAPSULE ORAL DAILY
Status: DISCONTINUED | OUTPATIENT
Start: 2019-01-01 | End: 2019-01-01 | Stop reason: HOSPADM

## 2019-01-01 RX ORDER — SODIUM CHLORIDE 0.9 % (FLUSH) 0.9 %
3 SYRINGE (ML) INJECTION EVERY 12 HOURS SCHEDULED
Status: DISCONTINUED | OUTPATIENT
Start: 2019-01-01 | End: 2019-01-01 | Stop reason: HOSPADM

## 2019-01-01 RX ORDER — ONDANSETRON 2 MG/ML
4 INJECTION INTRAMUSCULAR; INTRAVENOUS EVERY 6 HOURS PRN
Status: DISCONTINUED | OUTPATIENT
Start: 2019-01-01 | End: 2019-01-01 | Stop reason: HOSPADM

## 2019-01-01 RX ORDER — LEVOTHYROXINE SODIUM 112 UG/1
112 TABLET ORAL DAILY
Status: DISCONTINUED | OUTPATIENT
Start: 2019-01-01 | End: 2019-01-01 | Stop reason: HOSPADM

## 2019-01-01 RX ORDER — POTASSIUM CHLORIDE 20 MEQ/1
20 TABLET, EXTENDED RELEASE ORAL DAILY
COMMUNITY

## 2019-01-01 RX ORDER — QUETIAPINE FUMARATE 25 MG/1
25 TABLET, FILM COATED ORAL EVERY 12 HOURS SCHEDULED
Status: DISCONTINUED | OUTPATIENT
Start: 2019-01-01 | End: 2019-01-01 | Stop reason: HOSPADM

## 2019-01-01 RX ORDER — DOXYCYCLINE 100 MG/1
100 CAPSULE ORAL EVERY 12 HOURS SCHEDULED
Status: DISCONTINUED | OUTPATIENT
Start: 2019-01-01 | End: 2019-01-01 | Stop reason: HOSPADM

## 2019-01-01 RX ORDER — DOCUSATE SODIUM 250 MG
250 CAPSULE ORAL 2 TIMES DAILY PRN
Status: DISCONTINUED | OUTPATIENT
Start: 2019-01-01 | End: 2019-01-01 | Stop reason: HOSPADM

## 2019-01-01 RX ORDER — GUAIFENESIN 600 MG/1
600 TABLET, EXTENDED RELEASE ORAL EVERY 12 HOURS SCHEDULED
Status: DISCONTINUED | OUTPATIENT
Start: 2019-01-01 | End: 2019-01-01 | Stop reason: HOSPADM

## 2019-01-01 RX ORDER — CLOPIDOGREL BISULFATE 75 MG/1
75 TABLET ORAL DAILY
Status: DISCONTINUED | OUTPATIENT
Start: 2019-01-01 | End: 2019-01-01 | Stop reason: HOSPADM

## 2019-01-01 RX ORDER — MORPHINE SULFATE 2 MG/ML
2 INJECTION, SOLUTION INTRAMUSCULAR; INTRAVENOUS EVERY 4 HOURS PRN
Status: DISCONTINUED | OUTPATIENT
Start: 2019-01-01 | End: 2019-01-01 | Stop reason: HOSPADM

## 2019-01-01 RX ORDER — OXAZEPAM 10 MG
10 CAPSULE ORAL NIGHTLY PRN
Qty: 3 CAPSULE | Refills: 0 | Status: SHIPPED | OUTPATIENT
Start: 2019-01-01

## 2019-01-01 RX ORDER — OXAZEPAM 10 MG
10 CAPSULE ORAL NIGHTLY PRN
Status: DISCONTINUED | OUTPATIENT
Start: 2019-01-01 | End: 2019-01-01 | Stop reason: HOSPADM

## 2019-01-01 RX ORDER — ATORVASTATIN CALCIUM 40 MG/1
40 TABLET, FILM COATED ORAL NIGHTLY
Start: 2019-01-01

## 2019-01-01 RX ORDER — PANTOPRAZOLE SODIUM 40 MG/1
40 TABLET, DELAYED RELEASE ORAL DAILY
Status: DISCONTINUED | OUTPATIENT
Start: 2019-01-01 | End: 2019-01-01 | Stop reason: HOSPADM

## 2019-01-01 RX ORDER — LEVOFLOXACIN 5 MG/ML
750 INJECTION, SOLUTION INTRAVENOUS ONCE
Status: COMPLETED | OUTPATIENT
Start: 2019-01-01 | End: 2019-01-01

## 2019-01-01 RX ORDER — LORAZEPAM 2 MG/ML
0.5 INJECTION INTRAMUSCULAR
Status: DISCONTINUED | OUTPATIENT
Start: 2019-01-01 | End: 2019-01-01 | Stop reason: HOSPADM

## 2019-01-01 RX ORDER — MORPHINE SULFATE 2 MG/ML
1 INJECTION, SOLUTION INTRAMUSCULAR; INTRAVENOUS EVERY 4 HOURS PRN
Status: DISCONTINUED | OUTPATIENT
Start: 2019-01-01 | End: 2019-01-01

## 2019-01-01 RX ORDER — CEFTRIAXONE 2 G/50ML
2 INJECTION, SOLUTION INTRAVENOUS EVERY 24 HOURS
Status: DISCONTINUED | OUTPATIENT
Start: 2019-01-01 | End: 2019-01-01

## 2019-01-01 RX ORDER — LORAZEPAM 2 MG/ML
0.5 CONCENTRATE ORAL EVERY 6 HOURS PRN
Qty: 30 ML | Refills: 3 | Status: SHIPPED | OUTPATIENT
Start: 2019-01-01

## 2019-01-01 RX ORDER — BISACODYL 10 MG
10 SUPPOSITORY, RECTAL RECTAL DAILY PRN
Status: DISCONTINUED | OUTPATIENT
Start: 2019-01-01 | End: 2019-01-01 | Stop reason: HOSPADM

## 2019-01-01 RX ORDER — TETRACAINE HYDROCHLORIDE 5 MG/ML
1 SOLUTION OPHTHALMIC AS NEEDED
COMMUNITY

## 2019-01-01 RX ORDER — TETRACAINE HYDROCHLORIDE 5 MG/ML
1 SOLUTION OPHTHALMIC AS NEEDED
Status: DISCONTINUED | OUTPATIENT
Start: 2019-01-01 | End: 2019-01-01 | Stop reason: HOSPADM

## 2019-01-01 RX ORDER — HYDROCODONE BITARTRATE AND ACETAMINOPHEN 5; 325 MG/1; MG/1
1 TABLET ORAL EVERY 6 HOURS
Qty: 240 TABLET | Refills: 0 | Status: ON HOLD | OUTPATIENT
Start: 2019-01-01 | End: 2019-01-01 | Stop reason: SDUPTHER

## 2019-01-01 RX ORDER — LORAZEPAM 2 MG/ML
0.5 INJECTION INTRAMUSCULAR EVERY 4 HOURS PRN
Status: DISCONTINUED | OUTPATIENT
Start: 2019-01-01 | End: 2019-01-01

## 2019-01-01 RX ORDER — HALOPERIDOL 5 MG/ML
0.5 INJECTION INTRAMUSCULAR EVERY 6 HOURS PRN
Status: DISCONTINUED | OUTPATIENT
Start: 2019-01-01 | End: 2019-01-01 | Stop reason: HOSPADM

## 2019-01-01 RX ORDER — GUAIFENESIN 600 MG/1
600 TABLET, EXTENDED RELEASE ORAL EVERY 12 HOURS SCHEDULED
Start: 2019-01-01

## 2019-01-01 RX ORDER — POTASSIUM CHLORIDE 20 MEQ/1
20 TABLET, EXTENDED RELEASE ORAL DAILY
Status: DISCONTINUED | OUTPATIENT
Start: 2019-01-01 | End: 2019-01-01 | Stop reason: HOSPADM

## 2019-01-01 RX ORDER — ASPIRIN 81 MG/1
81 TABLET ORAL DAILY
Status: DISCONTINUED | OUTPATIENT
Start: 2019-01-01 | End: 2019-01-01

## 2019-01-01 RX ORDER — ACETAMINOPHEN 500 MG
500 TABLET ORAL EVERY 6 HOURS PRN
COMMUNITY

## 2019-01-01 RX ORDER — IRON POLYSACCHARIDE COMPLEX 150 MG
150 CAPSULE ORAL DAILY
Start: 2019-01-01

## 2019-01-01 RX ORDER — NYSTATIN 100000 U/G
100000 CREAM TOPICAL 2 TIMES DAILY
Status: ON HOLD | COMMUNITY
End: 2019-01-01

## 2019-01-01 RX ORDER — LORAZEPAM 2 MG/ML
0.5 INJECTION INTRAMUSCULAR ONCE
Status: COMPLETED | OUTPATIENT
Start: 2019-01-01 | End: 2019-01-01

## 2019-01-01 RX ORDER — QUETIAPINE FUMARATE 25 MG/1
25 TABLET, FILM COATED ORAL NIGHTLY
COMMUNITY

## 2019-01-01 RX ORDER — SODIUM CHLORIDE 9 MG/ML
75 INJECTION, SOLUTION INTRAVENOUS CONTINUOUS
Status: ACTIVE | OUTPATIENT
Start: 2019-01-01 | End: 2019-01-01

## 2019-01-01 RX ORDER — CLOPIDOGREL BISULFATE 75 MG/1
75 TABLET ORAL DAILY
Qty: 30 TABLET
Start: 2019-01-01

## 2019-01-01 RX ADMIN — CLOPIDOGREL BISULFATE 75 MG: 75 TABLET ORAL at 08:34

## 2019-01-01 RX ADMIN — SODIUM CHLORIDE, PRESERVATIVE FREE 3 ML: 5 INJECTION INTRAVENOUS at 09:00

## 2019-01-01 RX ADMIN — SODIUM CHLORIDE TAB 1 GM 1 G: 1 TAB at 17:23

## 2019-01-01 RX ADMIN — Medication 400 MG: at 22:26

## 2019-01-01 RX ADMIN — SODIUM CHLORIDE, PRESERVATIVE FREE 10 ML: 5 INJECTION INTRAVENOUS at 10:29

## 2019-01-01 RX ADMIN — SODIUM CHLORIDE 500 ML: 9 INJECTION, SOLUTION INTRAVENOUS at 18:18

## 2019-01-01 RX ADMIN — SODIUM CHLORIDE, PRESERVATIVE FREE 3 ML: 5 INJECTION INTRAVENOUS at 09:03

## 2019-01-01 RX ADMIN — Medication 400 MG: at 17:22

## 2019-01-01 RX ADMIN — DICLOFENAC 2 G: 10 GEL TOPICAL at 22:27

## 2019-01-01 RX ADMIN — HYDROCODONE BITARTRATE AND ACETAMINOPHEN 1 TABLET: 5; 325 TABLET ORAL at 23:42

## 2019-01-01 RX ADMIN — MUPIROCIN: 20 OINTMENT TOPICAL at 23:30

## 2019-01-01 RX ADMIN — DOXYCYCLINE 100 MG: 100 CAPSULE ORAL at 14:55

## 2019-01-01 RX ADMIN — CLOPIDOGREL BISULFATE 75 MG: 75 TABLET ORAL at 09:00

## 2019-01-01 RX ADMIN — LEVOTHYROXINE SODIUM 112 MCG: 112 TABLET ORAL at 09:00

## 2019-01-01 RX ADMIN — HYDROCODONE BITARTRATE AND ACETAMINOPHEN 1 TABLET: 5; 325 TABLET ORAL at 17:22

## 2019-01-01 RX ADMIN — METOPROLOL TARTRATE 12.5 MG: 25 TABLET ORAL at 08:17

## 2019-01-01 RX ADMIN — Medication 400 MG: at 09:00

## 2019-01-01 RX ADMIN — ONDANSETRON 4 MG: 2 INJECTION INTRAMUSCULAR; INTRAVENOUS at 23:02

## 2019-01-01 RX ADMIN — DOCUSATE SODIUM 250 MG: 250 CAPSULE, LIQUID FILLED ORAL at 09:00

## 2019-01-01 RX ADMIN — Medication 400 MG: at 08:59

## 2019-01-01 RX ADMIN — SODIUM CHLORIDE, PRESERVATIVE FREE 3 ML: 5 INJECTION INTRAVENOUS at 22:28

## 2019-01-01 RX ADMIN — Medication 400 MG: at 16:44

## 2019-01-01 RX ADMIN — LORAZEPAM 0.5 MG: 2 INJECTION INTRAMUSCULAR; INTRAVENOUS at 00:55

## 2019-01-01 RX ADMIN — ATORVASTATIN CALCIUM 40 MG: 40 TABLET, FILM COATED ORAL at 22:26

## 2019-01-01 RX ADMIN — METOPROLOL TARTRATE 12.5 MG: 25 TABLET ORAL at 08:37

## 2019-01-01 RX ADMIN — LORAZEPAM 0.5 MG: 2 INJECTION INTRAMUSCULAR; INTRAVENOUS at 03:07

## 2019-01-01 RX ADMIN — SODIUM CHLORIDE 75 ML/HR: 9 INJECTION, SOLUTION INTRAVENOUS at 08:57

## 2019-01-01 RX ADMIN — GUAIFENESIN 600 MG: 600 TABLET, EXTENDED RELEASE ORAL at 13:40

## 2019-01-01 RX ADMIN — MUPIROCIN: 20 OINTMENT TOPICAL at 00:30

## 2019-01-01 RX ADMIN — LORAZEPAM 0.5 MG: 2 INJECTION INTRAMUSCULAR; INTRAVENOUS at 22:27

## 2019-01-01 RX ADMIN — LEVOTHYROXINE SODIUM 112 MCG: 112 TABLET ORAL at 08:37

## 2019-01-01 RX ADMIN — LORAZEPAM 0.5 MG: 2 INJECTION INTRAMUSCULAR; INTRAVENOUS at 10:29

## 2019-01-01 RX ADMIN — SODIUM CHLORIDE TAB 1 GM 1 G: 1 TAB at 08:58

## 2019-01-01 RX ADMIN — CEFTRIAXONE 2 G: 2 INJECTION, SOLUTION INTRAVENOUS at 17:23

## 2019-01-01 RX ADMIN — SODIUM CHLORIDE TAB 1 GM 1 G: 1 TAB at 18:23

## 2019-01-01 RX ADMIN — SODIUM CHLORIDE TAB 1 GM 1 G: 1 TAB at 17:49

## 2019-01-01 RX ADMIN — METOPROLOL TARTRATE 12.5 MG: 25 TABLET ORAL at 08:59

## 2019-01-01 RX ADMIN — DICLOFENAC 2 G: 10 GEL TOPICAL at 09:01

## 2019-01-01 RX ADMIN — MEGESTROL ACETATE 400 MG: 40 SUSPENSION ORAL at 23:18

## 2019-01-01 RX ADMIN — MEGESTROL ACETATE 400 MG: 40 SUSPENSION ORAL at 23:46

## 2019-01-01 RX ADMIN — IPRATROPIUM BROMIDE AND ALBUTEROL SULFATE 3 ML: .5; 3 SOLUTION RESPIRATORY (INHALATION) at 18:42

## 2019-01-01 RX ADMIN — POTASSIUM CHLORIDE 20 MEQ: 1500 TABLET, EXTENDED RELEASE ORAL at 08:35

## 2019-01-01 RX ADMIN — Medication 400 MG: at 16:51

## 2019-01-01 RX ADMIN — GUAIFENESIN 600 MG: 600 TABLET, EXTENDED RELEASE ORAL at 23:20

## 2019-01-01 RX ADMIN — METOPROLOL TARTRATE 12.5 MG: 25 TABLET ORAL at 23:19

## 2019-01-01 RX ADMIN — CILOSTAZOL 50 MG: 100 TABLET ORAL at 22:26

## 2019-01-01 RX ADMIN — LORAZEPAM 0.5 MG: 2 INJECTION INTRAMUSCULAR; INTRAVENOUS at 19:51

## 2019-01-01 RX ADMIN — MUPIROCIN: 20 OINTMENT TOPICAL at 09:02

## 2019-01-01 RX ADMIN — HYDROCODONE BITARTRATE AND ACETAMINOPHEN 1 TABLET: 5; 325 TABLET ORAL at 06:27

## 2019-01-01 RX ADMIN — SODIUM CHLORIDE TAB 1 GM 1 G: 1 TAB at 11:48

## 2019-01-01 RX ADMIN — DICLOFENAC 2 G: 10 GEL TOPICAL at 10:00

## 2019-01-01 RX ADMIN — ASPIRIN 81 MG: 81 TABLET, COATED ORAL at 09:02

## 2019-01-01 RX ADMIN — LORAZEPAM 0.5 MG: 2 INJECTION INTRAMUSCULAR; INTRAVENOUS at 08:21

## 2019-01-01 RX ADMIN — ENOXAPARIN SODIUM 30 MG: 30 INJECTION SUBCUTANEOUS at 23:49

## 2019-01-01 RX ADMIN — PANTOPRAZOLE SODIUM 40 MG: 40 TABLET, DELAYED RELEASE ORAL at 08:35

## 2019-01-01 RX ADMIN — QUETIAPINE FUMARATE 25 MG: 25 TABLET ORAL at 23:20

## 2019-01-01 RX ADMIN — CEFEPIME 2 G: 1 INJECTION, POWDER, FOR SOLUTION INTRAMUSCULAR; INTRAVENOUS at 18:16

## 2019-01-01 RX ADMIN — LEVOTHYROXINE SODIUM 112 MCG: 112 TABLET ORAL at 08:59

## 2019-01-01 RX ADMIN — MEGESTROL ACETATE 400 MG: 40 SUSPENSION ORAL at 08:59

## 2019-01-01 RX ADMIN — SODIUM CHLORIDE, PRESERVATIVE FREE 3 ML: 5 INJECTION INTRAVENOUS at 09:01

## 2019-01-01 RX ADMIN — VANCOMYCIN HYDROCHLORIDE 1000 MG: 1 INJECTION, POWDER, LYOPHILIZED, FOR SOLUTION INTRAVENOUS at 21:41

## 2019-01-01 RX ADMIN — HYDROCODONE BITARTRATE AND ACETAMINOPHEN 1 TABLET: 5; 325 TABLET ORAL at 11:48

## 2019-01-01 RX ADMIN — MEGESTROL ACETATE 400 MG: 40 SUSPENSION ORAL at 09:00

## 2019-01-01 RX ADMIN — DICLOFENAC 2 G: 10 GEL TOPICAL at 23:50

## 2019-01-01 RX ADMIN — SODIUM CHLORIDE TAB 1 GM 1 G: 1 TAB at 08:37

## 2019-01-01 RX ADMIN — SODIUM CHLORIDE 75 ML/HR: 9 INJECTION, SOLUTION INTRAVENOUS at 00:20

## 2019-01-01 RX ADMIN — HYDROCODONE BITARTRATE AND ACETAMINOPHEN 1 TABLET: 5; 325 TABLET ORAL at 10:29

## 2019-01-01 RX ADMIN — QUETIAPINE FUMARATE 25 MG: 25 TABLET ORAL at 22:25

## 2019-01-01 RX ADMIN — DEXTROSE AND SODIUM CHLORIDE 75 ML/HR: 5; 450 INJECTION, SOLUTION INTRAVENOUS at 10:32

## 2019-01-01 RX ADMIN — IPRATROPIUM BROMIDE AND ALBUTEROL SULFATE 3 ML: .5; 3 SOLUTION RESPIRATORY (INHALATION) at 13:12

## 2019-01-01 RX ADMIN — PANTOPRAZOLE SODIUM 40 MG: 40 TABLET, DELAYED RELEASE ORAL at 09:00

## 2019-01-01 RX ADMIN — ENOXAPARIN SODIUM 30 MG: 30 INJECTION SUBCUTANEOUS at 23:31

## 2019-01-01 RX ADMIN — METOPROLOL TARTRATE 12.5 MG: 25 TABLET ORAL at 23:42

## 2019-01-01 RX ADMIN — DICLOFENAC 2 G: 10 GEL TOPICAL at 23:30

## 2019-01-01 RX ADMIN — MEGESTROL ACETATE 400 MG: 40 SUSPENSION ORAL at 22:25

## 2019-01-01 RX ADMIN — HYDROCODONE BITARTRATE AND ACETAMINOPHEN 1 TABLET: 5; 325 TABLET ORAL at 16:44

## 2019-01-01 RX ADMIN — IPRATROPIUM BROMIDE AND ALBUTEROL SULFATE 3 ML: .5; 3 SOLUTION RESPIRATORY (INHALATION) at 20:36

## 2019-01-01 RX ADMIN — LEVOFLOXACIN 750 MG: 5 INJECTION, SOLUTION INTRAVENOUS at 00:18

## 2019-01-01 RX ADMIN — CILOSTAZOL 50 MG: 100 TABLET ORAL at 23:30

## 2019-01-01 RX ADMIN — LORAZEPAM 0.5 MG: 2 INJECTION INTRAMUSCULAR; INTRAVENOUS at 06:18

## 2019-01-01 RX ADMIN — QUETIAPINE FUMARATE 25 MG: 25 TABLET ORAL at 23:42

## 2019-01-01 RX ADMIN — MORPHINE SULFATE 2 MG: 2 INJECTION, SOLUTION INTRAMUSCULAR; INTRAVENOUS at 21:23

## 2019-01-01 RX ADMIN — VANCOMYCIN HYDROCHLORIDE 1000 MG: 1 INJECTION, POWDER, LYOPHILIZED, FOR SOLUTION INTRAVENOUS at 09:07

## 2019-01-01 RX ADMIN — MUPIROCIN 1 APPLICATION: 20 OINTMENT TOPICAL at 08:38

## 2019-01-01 RX ADMIN — MICONAZOLE NITRATE 1 APPLICATION: 2 OINTMENT TOPICAL at 09:01

## 2019-01-01 RX ADMIN — ONDANSETRON 4 MG: 2 INJECTION INTRAMUSCULAR; INTRAVENOUS at 23:18

## 2019-01-01 RX ADMIN — DICLOFENAC 2 G: 10 GEL TOPICAL at 09:02

## 2019-01-01 RX ADMIN — OXAZEPAM 10 MG: 10 CAPSULE, GELATIN COATED ORAL at 23:22

## 2019-01-01 RX ADMIN — MUPIROCIN: 20 OINTMENT TOPICAL at 22:27

## 2019-01-01 RX ADMIN — QUETIAPINE FUMARATE 25 MG: 25 TABLET ORAL at 08:17

## 2019-01-01 RX ADMIN — MICONAZOLE NITRATE 1 APPLICATION: 2 OINTMENT TOPICAL at 08:38

## 2019-01-01 RX ADMIN — HYDROCODONE BITARTRATE AND ACETAMINOPHEN 1 TABLET: 5; 325 TABLET ORAL at 22:26

## 2019-01-01 RX ADMIN — METOPROLOL TARTRATE 12.5 MG: 25 TABLET ORAL at 09:01

## 2019-01-01 RX ADMIN — MEGESTROL ACETATE 400 MG: 40 SUSPENSION ORAL at 08:34

## 2019-01-01 RX ADMIN — Medication 400 MG: at 08:38

## 2019-01-01 RX ADMIN — MUPIROCIN 1 APPLICATION: 20 OINTMENT TOPICAL at 09:01

## 2019-01-01 RX ADMIN — Medication 400 MG: at 23:42

## 2019-01-01 RX ADMIN — HYDROCODONE BITARTRATE AND ACETAMINOPHEN 1 TABLET: 5; 325 TABLET ORAL at 16:51

## 2019-01-01 RX ADMIN — MUPIROCIN: 20 OINTMENT TOPICAL at 09:00

## 2019-01-01 RX ADMIN — CILOSTAZOL 50 MG: 100 TABLET ORAL at 08:36

## 2019-01-01 RX ADMIN — DEXTROSE AND SODIUM CHLORIDE 75 ML/HR: 5; 450 INJECTION, SOLUTION INTRAVENOUS at 03:53

## 2019-01-01 RX ADMIN — DEXTROSE AND SODIUM CHLORIDE 75 ML/HR: 5; 450 INJECTION, SOLUTION INTRAVENOUS at 13:39

## 2019-01-01 RX ADMIN — SODIUM CHLORIDE, PRESERVATIVE FREE 3 ML: 5 INJECTION INTRAVENOUS at 00:21

## 2019-01-01 RX ADMIN — MORPHINE SULFATE 2 MG: 2 INJECTION, SOLUTION INTRAMUSCULAR; INTRAVENOUS at 01:48

## 2019-01-01 RX ADMIN — MICONAZOLE NITRATE: 2 OINTMENT TOPICAL at 09:46

## 2019-01-01 RX ADMIN — CEFTRIAXONE 2 G: 2 INJECTION, SOLUTION INTRAVENOUS at 17:49

## 2019-01-01 RX ADMIN — CILOSTAZOL 50 MG: 100 TABLET ORAL at 09:00

## 2019-01-01 RX ADMIN — DICLOFENAC 2 G: 10 GEL TOPICAL at 08:37

## 2019-01-01 RX ADMIN — HYDROCODONE BITARTRATE AND ACETAMINOPHEN 1 TABLET: 5; 325 TABLET ORAL at 04:32

## 2019-01-01 RX ADMIN — SODIUM CHLORIDE TAB 1 GM 1 G: 1 TAB at 12:38

## 2019-01-01 RX ADMIN — Medication 400 MG: at 23:31

## 2019-01-01 RX ADMIN — SODIUM CHLORIDE, PRESERVATIVE FREE 10 ML: 5 INJECTION INTRAVENOUS at 19:51

## 2019-01-01 RX ADMIN — METOPROLOL TARTRATE 12.5 MG: 25 TABLET ORAL at 22:26

## 2019-01-01 RX ADMIN — GUAIFENESIN 600 MG: 600 TABLET, EXTENDED RELEASE ORAL at 08:35

## 2019-01-01 RX ADMIN — HYDROCODONE BITARTRATE AND ACETAMINOPHEN 1 TABLET: 5; 325 TABLET ORAL at 06:24

## 2019-01-01 RX ADMIN — POTASSIUM CHLORIDE 20 MEQ: 1500 TABLET, EXTENDED RELEASE ORAL at 08:59

## 2019-01-01 RX ADMIN — PANTOPRAZOLE SODIUM 40 MG: 40 TABLET, DELAYED RELEASE ORAL at 08:58

## 2019-01-01 RX ADMIN — ATORVASTATIN CALCIUM 40 MG: 40 TABLET, FILM COATED ORAL at 23:20

## 2019-01-01 RX ADMIN — POLYSACCHARIDE-IRON COMPLEX 150 MG: 150 CAPSULE ORAL at 14:55

## 2019-01-01 RX ADMIN — POTASSIUM CHLORIDE 20 MEQ: 1500 TABLET, EXTENDED RELEASE ORAL at 09:01

## 2019-01-01 RX ADMIN — HYDROCODONE BITARTRATE AND ACETAMINOPHEN 1 TABLET: 5; 325 TABLET ORAL at 08:22

## 2019-01-01 RX ADMIN — IPRATROPIUM BROMIDE AND ALBUTEROL SULFATE 3 ML: .5; 3 SOLUTION RESPIRATORY (INHALATION) at 07:25

## 2019-01-01 RX ADMIN — HYDROCODONE BITARTRATE AND ACETAMINOPHEN 1 TABLET: 5; 325 TABLET ORAL at 13:57

## 2019-01-01 RX ADMIN — ENOXAPARIN SODIUM 30 MG: 30 INJECTION SUBCUTANEOUS at 22:25

## 2019-01-01 RX ADMIN — HYDROCODONE BITARTRATE AND ACETAMINOPHEN 1 TABLET: 5; 325 TABLET ORAL at 11:56

## 2019-01-01 RX ADMIN — HYDROCODONE BITARTRATE AND ACETAMINOPHEN 1 TABLET: 5; 325 TABLET ORAL at 23:21

## 2019-01-01 RX ADMIN — SODIUM CHLORIDE TAB 1 GM 1 G: 1 TAB at 09:00

## 2019-01-01 RX ADMIN — HALOPERIDOL LACTATE 0.5 MG: 5 INJECTION, SOLUTION INTRAMUSCULAR at 04:24

## 2019-01-15 PROBLEM — L03.115 CELLULITIS OF RIGHT LOWER EXTREMITY: Status: ACTIVE | Noted: 2019-01-01

## 2019-01-15 PROBLEM — R93.89 ABNORMAL CHEST X-RAY: Status: ACTIVE | Noted: 2019-01-01

## 2019-01-15 PROBLEM — J18.9 PNEUMONIA OF RIGHT LOWER LOBE DUE TO INFECTIOUS ORGANISM: Status: ACTIVE | Noted: 2019-01-01

## 2019-01-15 NOTE — ED NOTES
Roxann assigned assigned bed 312 at this time. Rashmi PATRICIA notified.      Loli Mcmillan  01/15/19 8288
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
## 2019-01-15 NOTE — ED NOTES
Roxann, house supervisor, called at this time for telemetry bed assignment. Will call back.      Loli Mcmillan  01/15/19 2237

## 2019-01-16 NOTE — PLAN OF CARE
Problem: Wound (Includes Pressure Injury) (Adult)  Goal: Signs and Symptoms of Listed Potential Problems Will be Absent, Minimized or Managed (Wound)  Outcome: Ongoing (interventions implemented as appropriate)   01/16/19 4071   Goal/Outcome Evaluation   Problems Assessed (Wound) all   Problems Present (Wound) delayed wound healing

## 2019-01-16 NOTE — PROGRESS NOTES
Adult Nutrition  Assessment/PES    Patient Name:  Deidre Bobby  YOB: 1925  MRN: [id number]  Admit Date:  1/15/2019    Assessment Date:  1/16/2019    Comments:  Rec. #1: Consider adding high calorie, high protein to current diet order. Pt. Exhibiting low p.o. Intake ~13% of two meals with one meal refusal noted. RD added arginaid BID, Boost Plus BID per pt. Request. Rec. #2: Consider adding MVI with minerals daily and Vitamin C 500 mg BID to promote healing. Rec.#3: Continue with Megace as ordered. Rec. #4: Consider use of nutrition support if pt./family agreeable. RD to follow pt. Consult RD PRN.         Reason for Assessment     Row Name 01/16/19 1600          Reason for Assessment    Reason For Assessment  diagnosis/disease state     Diagnosis  other (see comments);hematological/related complications;neurologic conditions;cardiac disease Cellulitis, skin breakdown noted, ARIADNA, leukocytosis, suspected vascular dementia noted, CAD, CHF     Identified At Risk by Screening Criteria  BMI;large or nonhealing wound, burn or pressure injury             Labs/Tests/Procedures/Meds     Row Name 01/16/19 1602          Labs/Procedures/Meds    Lab Results Reviewed  reviewed, pertinent     Lab Results Comments  High: BUN, Plt. Count   Low: Na        Medications    Pertinent Medications Reviewed  reviewed, pertinent     Pertinent Medications Comments  Megace 400 mg BID         Physical Findings     Row Name 01/16/19 1602          Physical Findings    Overall Physical Appearance  underweight     Skin  non-healing wound(s)         Estimated/Assessed Needs     Row Name 01/16/19 1602          Calculation Measurements    Weight Used For Calculations  57.3 kg (126 lb 4.8 oz)        Estimated/Assessed Needs    Additional Documentation  Fluid Requirements (Group);[place]-St. Jeor Equation (Group);Protein Requirements (Group);Calorie Requirements (Group)        Calorie Requirements    Estimated Calorie Requirement  Comment  ~6143-5479        KCAL/KG    14 Kcal/Kg (kcal)  802.06     15 Kcal/Kg (kcal)  859.35     18 Kcal/Kg (kcal)  1031.22     20 Kcal/Kg (kcal)  1145.8     25 Kcal/Kg (kcal)  1432.25     30 Kcal/Kg (kcal)  1718.7     35 Kcal/Kg (kcal)  2005.15     40 Kcal/Kg (kcal)  2291.6     45 Kcal/Kg (kcal)  2578.05     50 Kcal/Kg (kcal)  2864.5        [place]-St. Jeor Equation    RMR ([place]-St. Jeor Equation)  978.78        Protein Requirements    Est Protein Requirement Amount (gms/kg)  1.5 gm protein ~69-86     Estimated Protein Requirements (gms/day)  85.94        Fluid Requirements    Estimated Fluid Requirement Method  [place]-Segar Formula     [place]-Segar Method (over 20 kg)  2645.8         Nutrition Prescription Ordered     Row Name 01/16/19 1603          Nutrition Prescription PO    Current PO Diet  Regular     Common Modifiers  Cardiac     Other Modifiers  Other (comment) no nuts, skins, seeds, hulls         Evaluation of Received Nutrient/Fluid Intake     Row Name 01/16/19 1603 01/16/19 1602       Calculation Measurements    Weight Used For Calculations  --  57.3 kg (126 lb 4.8 oz)       PO Evaluation    Number of Days PO Intake Evaluated  1 day  --    Number of Meals  2  --    % PO Intake  13  --        Evaluation of Prescribed Nutrient/Fluid Intake     Row Name 01/16/19 1602          Calculation Measurements    Weight Used For Calculations  57.3 kg (126 lb 4.8 oz)         Malnutrition Severity Assessment     Row Name 01/16/19 1610          Malnutrition Severity Assessment    Malnutrition Type  Chronic Illness Malnutrition        Physical Signs of Malnutrition (Chronic)    Muscle Wasting  Mild     Fat Loss  Mild     Secondary Physical Signs  Present (comment) slight depression temple region, clavicle bone prominent, depressed areas between thumb and forefinger, legs bones prominent with little sign of muscle around knee.         Weight Status (Chronic)    BMI  Mild (<19)     %IBW  Mild (<90%)        Energy  Intake Status (Chronic)    Energy Intake  Mild (<75% / 5d)        Criteria Met (Must meet criteria for severity in at least 2 of these categories: M Wasting, Fat Loss, Fluid, Secondary Signs, Wt. Status, Intake)    Patient meets criteria for   Mild malnutrition           Problem/Interventions:  Problem 1     Row Name 01/16/19 1604          Nutrition Diagnoses Problem 1    Problem 1  Underweight     Etiology (related to)  Factors Affecting Nutrition     Appetite  Poor at this Time     Signs/Symptoms (evidenced by)  BMI     BMI  18 - 18.9         Problem 2     Row Name 01/16/19 1605          Nutrition Diagnoses Problem 2    Problem 2  Inadequate Intake/Infusion     Inadequate Intake Type  Oral     Macronutrient  Kcal;Protein;Fluid;Carbohydrate;Fiber;Fat     Micronutrient  Vitamin;Mineral     Etiology (related to)  Factors Affecting Nutrition     Appetite  Poor at this Time     Signs/Symptoms (evidenced by)  PO Intake     Percent (%) intake recorded  13 %     Over number of meals  2 one meal refusal noted         Problem 3     Row Name 01/16/19 1606          Nutrition Diagnoses Problem 3    Problem 3  Increased Nutrient Needs     Macronutrient  Kcal;Protein;Fat     Micronutrient  Vitamin;Mineral     Etiology (related to)  Medical Diagnosis     Skin  Non healing wound;Cellulitis     Signs/Symptoms (evidenced by)  Other (comment) wound healing             Intervention Goal     Row Name 01/16/19 1607          Intervention Goal    General  Meet nutritional needs for age/condition     PO  PO intake (%)     PO Intake %  50 %     Weight  Appropriate weight gain         Nutrition Intervention     Row Name 01/16/19 160          Nutrition Intervention    RD/Tech Action  Follow Tx progress;Encourage intake;Interview for preference;Supplement provided;Recommend/ordered     Recommended/Ordered  Supplement         Nutrition Prescription     Row Name 01/16/19 1603          Nutrition Prescription PO    PO Prescription  Begin/change  diet;Begin/change supplement     Supplement  Other (comment);Boost Plus Arginaid     Supplement Frequency  2 times a day     Other Modifiers  High protein/high calorie     New PO Prescription Ordered?  No, recommended        Other Orders    Supplement  Vitamin mineral supplement     Supplement Ordered?  No, recommended     Other  continue to monitor/replace electrolytes         Education/Evaluation     Row Name 01/16/19 8780          Education    Education  Education not appropriate at this time     Please explain  Patient confusion LTC resident with suspected vascular dementia noted        Monitor/Evaluation    Monitor  Per protocol;PO intake;I&O;Supplement intake;Pertinent labs;Weight;Skin status           Electronically signed by:  Lourdes Álvarez RD  01/16/19 4:16 PM

## 2019-01-16 NOTE — PLAN OF CARE
Problem: Patient Care Overview  Goal: Plan of Care Review  Outcome: Ongoing (interventions implemented as appropriate)   01/16/19 6249   Coping/Psychosocial   Plan of Care Reviewed With patient;grandchild(lazara)   OTHER   Outcome Summary Palliative Care Consult visit. Spoke with patient and her granddaughter at her bedsdie. Granddaughter, Sia, is the patient's POA and stated that as a  she has her grandmother's paperwork in order. Granddaughter has a close relationship with her grandmother and is very attentive to her physical and emotional needs. The patient was sleepy, but conversational. Granddaughter was open to talking about palliative care, comfort measures, and non-agressive interventions for her grandmother's care. I offered prayer at her request, and ill continue to follow.
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
## 2019-01-16 NOTE — PROGRESS NOTES
Malnutrition Severity Assessment    Patient Name:  Deidre Bobby  YOB: 1925  MRN: [id number]  Admit Date:  1/15/2019    Patient meets criteria for : Mild malnutrition    Comments:  Rec. #1: Consider adding high calorie, high protein to current diet order. Pt. Exhibiting low p.o. Intake ~13% of two meals with one meal refusal noted. RD added arginaid BID, Boost Plus BID per pt. Request. Rec. #2: Consider adding MVI with minerals daily and Vitamin C 500 mg BID to promote healing. Rec.#3: Continue with Megace as ordered. Rec. #4: Consider use of nutrition support if pt./family agreeable. RD to follow pt. Consult RD PRN.     Malnutrition Type: Chronic Illness Malnutrition     Malnutrition Type (last 8 hours)      Malnutrition Severity Assessment     Row Name 01/16/19 1610       Malnutrition Severity Assessment    Malnutrition Type  Chronic Illness Malnutrition    Row Name 01/16/19 1610       Physical Signs of Malnutrition (Chronic)    Muscle Wasting  Mild    Fat Loss  Mild    Secondary Physical Signs  Present (comment) slight depression temple region, clavicle bone prominent, depressed areas between thumb and forefinger, legs bones prominent with little sign of muscle around knee.     Row Name 01/16/19 1610       Weight Status (Chronic)    BMI  Mild (<19)    %IBW  Mild (<90%)    Row Name 01/16/19 1610       Energy Intake Status (Chronic)    Energy Intake  Mild (<75% / 5d)    Row Name 01/16/19 1610       Criteria Met (Must meet criteria for severity in at least 2 of these categories: M Wasting, Fat Loss, Fluid, Secondary Signs, Wt. Status, Intake)    Patient meets criteria for   Mild malnutrition          Electronically signed by:  Lourdes Álvarez RD  01/16/19 4:14 PM

## 2019-01-16 NOTE — PROGRESS NOTES
Discharge Planning Assessment  [facility]     Patient Name: Deidre Bobby  MRN: [id number]  Today's Date: 1/16/2019    Admit Date: 1/15/2019    Discharge Needs Assessment     Row Name 01/16/19 1608       Discharge Needs Assessment    Discharge Facility/Level of Care Needs  nursing facility, intermediate    Row Name 01/16/19 1606       Living Environment    Lives With  facility resident [facility]    Current Living Arrangements  extended care facility    Primary Care Provided by  other (see comments)    Provides Primary Care For  no one    Family Caregiver if Needed  none    Quality of Family Relationships  supportive    Able to Return to Prior Arrangements  yes       Resource/Environmental Concerns    Resource/Environmental Concerns  none       Transition Planning    Patient/Family Anticipates Transition to  long term care facility    Patient/Family Anticipated Services at Transition  none    Transportation Anticipated  other (see comments)       Discharge Needs Assessment    Readmission Within the Last 30 Days  no previous admission in last 30 days    Concerns to be Addressed  denies needs/concerns at this time    Equipment Currently Used at Home  wheelchair    Anticipated Changes Related to Illness  none    Equipment Needed After Discharge  none    Offered/Gave Vendor List  no        Discharge Plan     Row Name 01/16/19 1609       Plan    Plan  [facility]    Patient/Family in Agreement with Plan  yes    Plan Comments  Spoke to Karlene with [facility].  Karlene confirms that pt is LTC and bed is on hold.  Pt may return whenever she is medically stable.      Spoke with grand daughter (Gaviota) via phone call.  Gaviota confirms that she plans for pt to return to [facility].  Pt is completely dependent upon care.  She is able to get into a WC, but recently has been having trouble getting on to her feet to be able to transfer.  Pt will require an ambulance for transport.  Address and phone is  for Gaviota.  Gaviota is POA.  Pt no longer signs paperwork d/t loss of eyesight.  CM will continue to follow and assist with discharge as needed.        Destination      No service coordination in this encounter.      Durable Medical Equipment      No service coordination in this encounter.      Dialysis/Infusion      No service coordination in this encounter.      Home Medical Care      No service coordination in this encounter.      Community Resources      No service coordination in this encounter.        Expected Discharge Date and Time     Expected Discharge Date Expected Discharge Time    Jan 18, 2019         Demographic Summary     Row Name 01/16/19 1523       General Information    Admission Type  inpatient    Arrived From  emergency department    Referral Source  admission list    Reason for Consult  discharge planning    Preferred Language  English     Used During This Interaction  no        Functional Status     Row Name 01/16/19 1526       Functional Status    Usual Activity Tolerance  fair       Functional Status, IADL    Medications  completely dependent    Meal Preparation  completely dependent    Housekeeping  completely dependent    Laundry  completely dependent    Shopping  completely dependent        Psychosocial    No documentation.       Abuse/Neglect    No documentation.       Legal    No documentation.       Substance Abuse    No documentation.       Patient Forms    No documentation.           Juana Segovia

## 2019-01-16 NOTE — PLAN OF CARE
Problem: Fall Risk (Adult)  Goal: Absence of Fall  Outcome: Ongoing (interventions implemented as appropriate)   01/16/19 7081   Fall Risk (Adult)   Absence of Fall making progress toward outcome

## 2019-01-17 NOTE — PLAN OF CARE
Problem: Patient Care Overview  Goal: Plan of Care Review  Outcome: Ongoing (interventions implemented as appropriate)   01/17/19 1203   Coping/Psychosocial   Plan of Care Reviewed With patient   OTHER   Outcome Summary (skin assessed)   Plan of Care Review   Progress no change       Problem: Wound (Includes Pressure Injury) (Adult)  Goal: Signs and Symptoms of Listed Potential Problems Will be Absent, Minimized or Managed (Wound)   01/17/19 1203   Goal/Outcome Evaluation   Problems Assessed (Wound) all   Problems Present (Wound) pain  (pain in left great toe)       Problem: Skin Injury Risk (Adult)  Goal: Identify Related Risk Factors and Signs and Symptoms  Outcome: Ongoing (interventions implemented as appropriate)   01/17/19 1203   Skin Injury Risk (Adult)   Related Risk Factors (Skin Injury Risk) advanced age;edema;fluid intake inadequate;medical devices;mechanical forces;mobility impaired;moisture;nutritional deficiencies;skeletal deformities;tissue perfusion altered

## 2019-01-17 NOTE — PLAN OF CARE
Problem: Wound (Includes Pressure Injury) (Adult)  Goal: Signs and Symptoms of Listed Potential Problems Will be Absent, Minimized or Managed (Wound)  Outcome: Ongoing (interventions implemented as appropriate)   01/17/19 1620   Goal/Outcome Evaluation   Problems Assessed (Wound) all   Problems Present (Wound) pain

## 2019-01-17 NOTE — PROGRESS NOTES
"Palliative Care RN consult obtained.  Informed per Alessia LEAVITT she may want Dr Liang to speak with family.  Dr Liang notified.  Visited pt this am.  Wound care nurse, Liudmila PATRICIA, assessing pt's leg and toe wounds.  Pt denied sensation on right great toe.  Pt did relate pain when left great toe was touched.  Pt [place]  I explained to pt I was part of the Palliative Care team and our goal was to make sure she was comfortable and that we are following her wishes--not doing more and not doing less than what she would want.  I inquired if she had spoken with her granddaughter.  She said she had and they had decided not to have surgery.  I then asked if they had spoken about, although not anticipated, if her heart should stop did she want to have someone do compressions on her chest and try to bring her back.  She related they were discussing this.   Pt requested a drink of water.  I offered her the water and she took some sips without any difficulty or coughing.  I contacted pt's granddaughter, Gaviota, to schedule a meeting.  She happened to be in the hospital parking lot and was coming to visit pt. Granddaughter reported aware of pt's decline over the last several months.  When I informed her the pt had said they had decided on no surgery, she reported they had NOT made any decisions and pt had not spoken with her about it.  She was requesting to speak with Dr Stevens \"just to hear what was going on with pt's leg and what his recommendations were\".  She requested Dr Stevens call her.  Informed her I would attempt to contact Dr Stevens to inform him of the request.  She was interested in hearing about Palliative Care and Hospice.  I explained what they each were, the criteria for each and the services each provided.    I informed her, which program to choose would be based on what the pt's goals of care would be--comfort and not seeking further interventions (Hospice) or seeking a curative approach (Palliative " "Care).  Granddaughter related she did not think she or the family would want to \"put pt through interventions\".  I informed her our Palliative Care MD would be here today around 2p and would be able to meet with her.  She related she had to leave but would return at 2P.  I then asked her about whether she thought the pt would want to be resuscitated if her heart stopped or she stops breathing.  Granddaughter reported she had spoken with her mother (pt's daughter) about this and they have decided to \"change (pt's) code status to DNR\".  Alessia LEAVITT updated on conversation and decision to change code status to DNR.  I informed Alessia the granddaughter wanted to speak with Dr Stevens.  Alessia offered to meet with the granddaugther to update her on what Dr Stevens had said.  Granddaughter stayed to speak with Ms Abdirahman Gary related what Dr Stevens had said and his recommendation to place pt on Plavix and no interventions.  Granddaughter also confirmed wanting pt to be a DNR.  They discussed Palliative Care vs Hospice.    Since Granddaughter was delayed in leaving she related she would not be able to come back at 2p today however will meet with Dr Liang tomorrow at 2p for discussion of options of care and  completion of MOST form.  "

## 2019-01-17 NOTE — PLAN OF CARE
Problem: Fall Risk (Adult)  Goal: Absence of Fall  Outcome: Ongoing (interventions implemented as appropriate)   01/17/19 1620   Fall Risk (Adult)   Absence of Fall making progress toward outcome

## 2019-01-17 NOTE — PLAN OF CARE
Problem: Patient Care Overview  Goal: Plan of Care Review  Outcome: Ongoing (interventions implemented as appropriate)   01/17/19 0514   Coping/Psychosocial   Plan of Care Reviewed With patient   OTHER   Outcome Summary Patient remains confused. VSS. Continue with IVFs and antibiotics.   Plan of Care Review   Progress no change     Goal: Individualization and Mutuality  Outcome: Ongoing (interventions implemented as appropriate)    Goal: Discharge Needs Assessment  Outcome: Ongoing (interventions implemented as appropriate)    Goal: Interprofessional Rounds/Family Conf  Outcome: Ongoing (interventions implemented as appropriate)      Problem: Fall Risk (Adult)  Goal: Absence of Fall  Outcome: Ongoing (interventions implemented as appropriate)      Problem: Wound (Includes Pressure Injury) (Adult)  Goal: Signs and Symptoms of Listed Potential Problems Will be Absent, Minimized or Managed (Wound)  Outcome: Ongoing (interventions implemented as appropriate)      Problem: Skin Injury Risk (Adult)  Goal: Identify Related Risk Factors and Signs and Symptoms  Outcome: Ongoing (interventions implemented as appropriate)    Goal: Skin Health and Integrity  Outcome: Ongoing (interventions implemented as appropriate)

## 2019-01-17 NOTE — NURSING NOTE
Pt resting in bed; unstageable pressure wounds to bilateral heels, cleansed and covered; dark calloused necrotic areas to end of both great toes, left great toe appears very painful; multiple scabs to bilateral extremities, one is open on left lat leg, cleansed and covered; offloading booties applied; emotional support given, plan of care explained

## 2019-01-17 NOTE — PROGRESS NOTES
PROGRESS NOTE        Date of Admission: 1/15/2019  Length of Stay: 2  Primary Care Physician: Rob Pantoja DO    Subjective   Chief Complaint:   HPI: The patient is a 89-year-old lady with macular degeneration, severe hearing loss, coronary artery disease, congestive heart failure, who presented to the emergency room today from her nursing facility for abnormal imaging found at the facility.  The patient has had progressively worsening right lower extremity pain over the past few weeks.  She had continued to functionally decline over the past 6 months, with bedridden status, progressive infections, dementia with poor daily function. Her primary care physician, Dr. Pantoja, ordered a bilateral lower extremity arterial duplex which showed moderate peripheral disease and an occluded right posterior tibial artery without visualization of the dorsalis pedis arteries.  History provided to the emergency room was from the granddaughter, power of .  The patient has become bedridden with wounds on her heels and thrashes her legs at night during her sleep.  The patient was taking aspirin daily prior to arrival.  The patient underwent an arterial duplex on 1/14/19 which show moderate atherosclerotic disease with an occluded right posterior tibial artery and dorsalis pedis artery not visualized.  Dr. Stevens with cardiology was consulted and did see and evaluate the patient.  He felt that the patient had a reasonable Doppler in both pedal pulses bilaterally.  She does have dry gangrene on her great toe.  This could be secondary to small vessel disease of the toes or thrombi embolic phenomenon.  He does recommend to treat medically at this time with Plavix and Pletal and no invasive workup is can be planned.  He feels that the ulcers on her heels are more pressure ulcers as opposed to vascular.  She does complain of some pain in her legs however he feels that with the intact pulses that she has it is unlikely that the  pain is vascular ischemia.    CT scans of her lower extremities were done which showed symptoms mild soft tissue edema and multiple fractures which are favored to be old there was no CT evidence of osteomyelitis.  An MRI could be obtained if there is high clinical concern for osteomyelitis.  Wound cultures were done of her lower extremities and are currently pending.  These wound appear to be more chronic in nature and no acute inflammation or erythema is noted.  She has been getting antibiotics in the form of Rocephin and vancomycin.   Given the patient's comorbid conditions as well as her advanced age I have consulted palliative care to discuss her CODE STATUS as well as the possibility of returning to the nursing facility with palliative care versus hospice to help with her pain control of her lower extremities.  On her CT scan she did have on the fifth metatarsal on the plantar aspect of that right foot there was a 2 mm density that they felt could be a soft tissue calcification but cannot rule out a tiny foreign body.  Given the chronicity of her lower extremities patient is not an ideal candidate for any surgical debridement of this area as this will place her at greater risk of an infection.     There was question of a pneumonia in the emergency room in the right lobe however CT scan of the chest was done which showed opacities in the lung bases which could be atelectasis or scarring but a developing infiltrate cannot be excluded.  She did have a cough this morning for which according to the nursing staff she did cough up some greenish yellow sputum.  We will continue with antibiotics and treat for suspected pneumonia.             Review Of Systems:   Review of Systems   General ROS: Patient denies any fevers, chills or loss of consciousness.    Psychological ROS: Denies any hallucinations and delusions.  Ophthalmic ROS: Denies any diplopia or transient loss of vision.  ENT ROS: Denies sore throat, nasal  congestion or ear pain.   Hematological and Lymphatic ROS: Denies neck swelling or easy bleeding.  Endocrine ROS: Denies any recent unintentional weight gain or loss.  Respiratory ROS: Denies cough or shortness of breath.   Cardiovascular ROS: Denies chest pain or palpitations. No history of exertional chest pain.   Gastrointestinal ROS: Denies nausea and vomiting. Denies any abdominal pain. No diarrhea.  Genito-Urinary ROS: Denies dysuria or hematuria.  Musculoskeletal ROS: Denies back pain. No muscle pain. No calf pain.   Neurological ROS: Denies any focal weakness. No loss of consciousness. Denies any numbness. Denies neck pain.   Dermatological ROS: Denies any redness or pruritis.    Objective      Temp:  [98 °F (36.7 °C)-99.1 °F (37.3 °C)] 98.2 °F (36.8 °C)  Heart Rate:  [64-95] 95  Resp:  [14-18] 18  BP: ()/(56-77) 96/76  Physical Exam    General Appearance:  Alert and cooperative, not in any acute distress.   Head:  Atraumatic and normocephalic, without obvious abnormality.   Eyes:          PERRLA, conjunctivae and sclerae normal, no Icterus. No pallor. Extraocular movements are within normal limits.   Ears:  Ears appear intact with no abnormalities noted.   Throat: No oral lesions, no thrush, oral mucosa moist.   Neck: Supple, trachea midline, no thyromegaly, no carotid bruit.       Lungs:   Chest shape is normal. Breath sounds heard bilaterally equally.  No crackles or wheezing. No Pleural rub or bronchial breathing.   Heart:  Normal S1 and S2, no murmur, no gallop, no rub. No JVD   Abdomen:   Normal bowel sounds, no masses, no organomegaly. Soft    non-tender, non-distended, no guarding, no rebound             tenderness   Extremities: Moves all extremities well, no edema, no cyanosis, no clubbing.     Pulses: Pulses palpable and equal bilaterally in the PT and DP.   Skin: No bleeding, diffuse Chronic appearing lower extremity ulcerations with a necrotic dry gangrenous ulcer to right great toe.   Bilateral heels with dry discolored ulcers.   Lymph nodes: No palpable adenopathy   Neurologic:    Psychiatric/Behavior:     Cranial nerves 2 - 12 grossly intact, sensation intact, Motor power is normal and equal bilaterally.  Mood normal, behavior normal       Results Review:    I have reviewed the labs, radiology results and diagnostic studies.    Results from last 7 days   Lab Units 01/17/19  0555   WBC 10*3/mm3 15.22*   HEMOGLOBIN g/dL 8.0*   PLATELETS 10*3/mm3 466*     Results from last 7 days   Lab Units 01/17/19  0555   SODIUM mmol/L 134*   POTASSIUM mmol/L 4.2   CO2 mmol/L 20.0*   CREATININE mg/dL 1.00   GLUCOSE mg/dL 101*       Culture Data:   Blood Culture   Date Value Ref Range Status   01/15/2019 No growth at 24 hours  Preliminary   01/15/2019 No growth at 24 hours  Preliminary     Wound Culture   Date Value Ref Range Status   01/15/2019 Light growth (2+) Staphylococcus aureus (A)  Preliminary     Radiology Data:   Cardiology Data:    I have reviewed the medications.    Assessment/Plan     Assessment and Plan:  1.  Cellulitis bilateral heel and left lower leg-  Resolving.  The areas upon my evaluation at this point appear to be more chronic in natures.  CT scan did not show any evidence of osteomyelitis.  Wound culture is growing staph aureus.  Final pending.  She was seen and evaluated by Dr. Stevens with interventional cardiology for vascular consultation.  He feels that the heel ulcers are more pressure rather than vascular in nature and he recommends giving her functional status and age with her previous history to treat medically with Plavix and Pletal at this time and no further invasive workup will be done.  She does have intact pulses.  He feels that the dry gangrene could be secondary to small vessel disease of the toes or thrombi embolic phenomenon.   Will await culture results and plan to discharge patient back to SNF with oral antibiotic.  Palliative has spoken with patient who states that she  does not want any surgery.  Will consult Dr. Liang to discuss advance directives.  Her leukocytosis is improving.    2.  Possible bilateral pneumonia-  Iv antibiotics, Duonebs and mucolytics.  She has had a productive cough this morning with yellowish sputum.  Her WBC is improving.     3.  Bilateral lower extremity diffuse chronic ulcerations    4.  Right great toe with dry gangrene-      5.  Recent subdural hematoma    6.  Acute renal failure-resolved with hydration    7.  Progressive functional decline-I have consulted palliative care to evaluate and discuss with the family the possibility of more comfort measures.  Consider possibility of hospice as they could assist with pain control.    8.  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  Coronary artery disease-medical management    10.  Hypothyroidism-  Patient is on Levothyroxine.      Addendum:  I have had a long discussion with  Patients granddaughter who is POA regarding her code status.  She does want her to be DNR.  Palliative care consulted and Dr. Liang will meet with her tomorrow.  I have discussed patients status and cardiology vascular recommendation.   I have discussed that the CT did not show any obvious evidence for osteo but I did explain that MRI is more sensitive.  Patient is not an ideal candidate for MRI as she has difficulty lying still.  We have agreed at this time given that are ulcerations appear chronic we will treat as skin with oral antibiotics.  Should her condition worsen when discharged on oral antibiotics then she can readdress IV antibiotics with Dr. Pantoja.  I have spoken with Dr. Pantoja who is in agreement with this plan.  Her wound culture is pending currently.  Likely disposition back to nursing facility in next 24-48 hours.    DVT prophylaxis:  Lovenox  Discharge Planning:   Alessia Manuel, APRN 01/17/19 11:11 AM
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
## 2019-01-18 NOTE — CONSULTS
[facility]    ADVANCED CARE PLANNING CONSULT      Name:  Deidre Bobby   Age:  89 y.o.  Sex:  female  :  1925  MRN:  [id number]   Visit Number:  [id number]  Date of Service:  19  Date of Admission:  1/15/2019  Primary Care Physician:  Rob Pantoja DO        [facility]    INPATIENT ADVANCED CARE PLANNING CONSULT      Name:  Deidre Bobby   Age:  89 y.o.  Sex:  female  :  1925  MRN:  [id number]   Visit Number:  [id number]  Date of Service:  19  Date of Admission:  1/15/2019  Primary Care Physician:  Rob Pantoja DO      Consulting Physician:    Dr. Makr Wood    Reason For Consult:    Addressing advance care planning in the setting of complex clinical course.    Brief Summary:    Mrs. Bobby is a 89 years old female with history of coronary artery disease, congestive heart failure, macular degeneration and hearing loss who was hospitalized January 15 after presenting with increasing right lower extremity pain.  Dr. Stevens/was consulted and did not feel that surgical intervention was warranted.  Of note, patient had an arterial ultrasound  revealing moderate atherosclerotic disease with occluded right posterior tibial artery and dorsalis pedis were not visualized.  On exam, revealed left lower extremity cellulitis, diffuse wounds and right great toe gangrene; workup revealed acute kidney injury.  Despite optimization of medical record, patient progressively declined with no signs of meaningful recovery, therefore palliative care consultation was requested to discuss advance care planning and further goals.    Advance Care Planning     Determination of decisional capacity:    · Patient lacks decisional capacity as evident by her inability to recall of comprehend details of our discussion neither is she capable of articulating her wishes or concluding to rational decisions.    Advanced Directives:     · Granddaughter Alisha Marshall  is DURABLE POWER OF .  Patient does not have a living will or other advanced directive documents.    Attendees:    · Meeting attendees: Granddaughter, palliative care RN and myself  · Meeting location: Patient's room    Summary of the discussion:     · After initial introductions and presenting the role of palliative care team, patient's granddaughter was informed in detail regarding patient's condition.  She stated that Mrs. Bobby had progressively declined over the past few months with increasing weakness and debility.  She felt that patient would elect to defer any left prolonging measures given her poor functional status and compromised quality of life.  She also felt that patient would elect for DO NOT RESUSCITATE CODE STATUS.  · On further discussion, granddaughter expressed her preference to defer further diagnostic and therapeutic interventions and instead proceed with comfort measures via hospice services.  · Patient is a resident of [facility].  She wishes to return where with hospice services.  · Above was communicated to primary attending who concurred and will follow on further plans.    Conclusions:    · Code status: DO NOT RESUSCITATE  · Medical interventions: Comfort measures with hospice services  · Advanced directives: MOST form discussed in detail and completed, signed and scanned into medical records       Dr. Wood, I appreciate the opportunity to participate in Mrs. Bobby's Care.  Please feel free to contact me for any upcoming questions or concerns as need arises.     Total time spent in counseling and advanced care planning discussion per above documentation 60 min.     Dragon system was utilized for this dictation; therefore unintended spelling or expressions may be noted in the body of this document.

## 2019-01-18 NOTE — PROGRESS NOTES
Continued Stay Note   Solo     Patient Name: Deidre Bobby  MRN: [id number]  Today's Date: 1/18/2019    Admit Date: 1/15/2019    Discharge Plan     Row Name 01/18/19 1014       Plan    Plan  [facility]    Patient/Family in Agreement with Plan  yes    Plan Comments Dr. Liang will be f/u with pt and family today.      Spoke to Karlene with [facility]; update given.  Per Karlene, okay to accept pt back to their facility anytime.  Report number is [phone], second floor.  Fax is [phone].   CM will continue to follow.        Discharge Codes    No documentation.       Expected Discharge Date and Time     Expected Discharge Date Expected Discharge Time    Jan 18, 2019             Juana Segovia

## 2019-01-18 NOTE — PLAN OF CARE
Problem: Patient Care Overview  Goal: Plan of Care Review  Outcome: Ongoing (interventions implemented as appropriate)   01/18/19 0512   Coping/Psychosocial   Plan of Care Reviewed With patient   OTHER   Outcome Summary Patient remains confused. Possible discharge back to rehab facility today or tomorrow.   Plan of Care Review   Progress no change     Goal: Individualization and Mutuality  Outcome: Ongoing (interventions implemented as appropriate)    Goal: Discharge Needs Assessment  Outcome: Ongoing (interventions implemented as appropriate)      Problem: Fall Risk (Adult)  Goal: Identify Related Risk Factors and Signs and Symptoms  Outcome: Ongoing (interventions implemented as appropriate)      Problem: Wound (Includes Pressure Injury) (Adult)  Goal: Signs and Symptoms of Listed Potential Problems Will be Absent, Minimized or Managed (Wound)  Outcome: Ongoing (interventions implemented as appropriate)      Problem: Skin Injury Risk (Adult)  Goal: Identify Related Risk Factors and Signs and Symptoms  Outcome: Ongoing (interventions implemented as appropriate)    Goal: Skin Health and Integrity  Outcome: Ongoing (interventions implemented as appropriate)

## 2019-01-18 NOTE — PROGRESS NOTES
[facility]    Name:  Deidre Bobby   Age:  89 y.o.  Sex:  female  :  1925  MRN:  [id number]   Visit Number:  [id number]  Admission Date:  1/15/2019  Date Of Service:  19  Primary Care Physician:  Rob Pantoja DO     LOS: 3 days :  Patient Care Team:  Rob Pantoja DO as PCP - General (Family Medicine):    History taken from:     chart    Chief Complaint:      Foot infection    Subjective     Interval History:     Patient seen today.  Reviewed history and physical, lab work, x-rays, chart.    Patient is a 89-year-old lady with macular degeneration, severe hearing loss, coronary artery disease, congestive heart failure who presented emergency room from nursing facility after abnormal imaging found at this facility.  She had progressive lower extremity pain on the right for the past few weeks.  She has had functional decline over the past 6 months.  She is bedridden with progressive infections and dementia with poor daily function.  She had a bilateral lower extremity arterial duplex which showed moderate peripheral arterial disease and occluded right posterior tibial artery without visualization of the dorsalis pedis arteries.  She has wounds on bilateral lower extremities.  Dr. Stevens was consulted and did see and evaluate the patient.  He felt the patient had a reasonable Doppler in both pedal pulses bilaterally.  She does have dry gangrene on the right.  He recommended treating medically with Plavix and Pletal and no invasive workup planned.  She was admitted and placed on vancomycin.  It was also felt she may have bilateral pneumonia.  She was placed on Rocephin as well.  She is improved.  Her WBC count has improved.  Cellulitis appears to have improved.  CT showed no evidence of osteomyelitis.  Wound cultures have grown out MRSA.  This is sensitive to doxycycline.  We'll discontinue Rocephin and vancomycin and place the patient on oral  doxycycline.    Granddaughter is power of .  She has decided to make the patient DNR.  We'll change the patient over to oral antibiotics.  We'll increase her Seroquel to 25 twice a day.  Due to her underlying dementia, she is unable to answer any questions with any consistency.  She refuses physical exam today.  Dr. Liang from palliative care is to meet with the family to discuss her advanced directives.  Patient may benefit from hospice.  She  has a poor prognosis.    Review of Systems:     Unable to obtain due to the patient's underlying condition.    Objective     Vital Signs:    Temp:  [98.1 °F (36.7 °C)-98.4 °F (36.9 °C)] 98.1 °F (36.7 °C)  Heart Rate:  [] 94  Resp:  [17-20] 19  BP: ()/(52-98) 125/98    Physical Exam:      General Appearance:    Alert, uncooperative.  No acute distress.     Head:    Normocephalic, without obvious abnormality, atraumatic   Eyes:            Lids and lashes normal, conjunctivae and sclerae normal, no   icterus, no pallor, corneas clear, PERRLA   Ears:    Ears appear intact with no abnormalities noted   Throat:   No oral lesions, no thrush, oral mucosa moist   Neck:   No adenopathy, supple, trachea midline, no thyromegaly, no     carotid bruit, no JVD   Back:     No kyphosis present, no scoliosis present, no skin lesions,       erythema or scars, no tenderness to percussion or                   palpation,   range of motion normal   Lungs:     Clear to auscultation,respirations regular, even and                   unlabored    Heart:    Regular rhythm and normal rate, normal S1 and S2, no            murmur, no gallop, no rub, no click   Breast Exam:    Deferred   Abdomen:     Normal bowel sounds, no masses, no organomegaly, soft        non-tender, non-distended, no guarding, no rebound                 tenderness   Genitalia:    Deferred   Extremities:   Moves all extremities , no edema, no cyanosis, no              redness.  Dry gangrene noted in great toe.      Pulses:   Pulses palpable and equal bilaterally   Skin:   No bleeding, bruising or rash   Lymph nodes:   No palpable adenopathy   Neurologic:   Cranial nerves 2 - 12 grossly intact, sensation intact, DTR        present and equal bilaterally        Results Review:      I reviewed the patient's new clinical results.    Labs:    Lab Results (last 24 hours)     Procedure Component Value Units Date/Time    Wound Culture - Wound, Leg, Left [[id number]]  (Abnormal)  (Susceptibility) Collected:  01/15/19 3584    Specimen:  Wound from Leg, Left Updated:  01/18/19 0836     Wound Culture Light growth (2+) Staphylococcus aureus, MRSA     Comment:   Methicillin resistant Staphylococcus aureus, Patient may be an isolation risk.       Susceptibility      Staphylococcus aureus, MRSA     DAVIS     Amoxicillin + Clavulanate Resistant     Ampicillin Resistant     Ampicillin + Sulbactam Resistant     Cefazolin Resistant     Ciprofloxacin Resistant     Clindamycin Resistant     Daptomycin Susceptible     Erythromycin Resistant     Gentamicin Susceptible     Levofloxacin Resistant     Linezolid Susceptible     Moxifloxacin Resistant     Oxacillin Resistant     Penicillin G Resistant     Quinupristin + Dalfopristin Susceptible     Rifampin Susceptible     Tetracycline Susceptible     Trimethoprim + Sulfamethoxazole Susceptible     Vancomycin Susceptible                    Iron Profile [[id number]]  (Abnormal) Collected:  01/18/19 0543    Specimen:  Blood Updated:  01/18/19 0807     Iron 16 mcg/dL      TIBC 192 mcg/dL      Iron Saturation 8 %     Basic Metabolic Panel [[id number]]  (Abnormal) Collected:  01/18/19 0543    Specimen:  Blood Updated:  01/18/19 0648     Glucose 121 mg/dL      BUN 15 mg/dL      Creatinine 0.90 mg/dL      Sodium 132 mmol/L      Potassium 4.3 mmol/L      Chloride 103 mmol/L      CO2 20.0 mmol/L      Calcium 8.3 mg/dL      eGFR Non African Amer 58 mL/min/1.73      BUN/Creatinine Ratio 16.7     Anion Gap 13.3 mmol/L      Narrative:       The MDRD GFR formula is only valid for adults with stable renal function between ages 18 and 70.    CBC & Differential [[id number]] Collected:  01/18/19 0543    Specimen:  Blood Updated:  01/18/19 0647    Narrative:       The following orders were created for panel order CBC & Differential.  Procedure                               Abnormality         Status                     ---------                               -----------         ------                     CBC Auto Differential[[id number]]        Abnormal            Final result                 Please view results for these tests on the individual orders.    CBC Auto Differential [[id number]]  (Abnormal) Collected:  01/18/19 0543    Specimen:  Blood Updated:  01/18/19 0647     WBC 13.75 10*3/mm3      RBC 2.34 10*6/mm3      Hemoglobin 7.5 g/dL      Hematocrit 23.8 %      .7 fL      MCH 32.1 pg      MCHC 31.5 g/dL      RDW 13.1 %      RDW-SD 47.9 fl      MPV 9.9 fL      Platelets 437 10*3/mm3      Neutrophil % 76.7 %      Lymphocyte % 11.5 %      Monocyte % 7.5 %      Eosinophil % 0.6 %      Basophil % 0.4 %      Immature Grans % 3.3 %      Neutrophils, Absolute 10.55 10*3/mm3      Lymphocytes, Absolute 1.58 10*3/mm3      Monocytes, Absolute 1.03 10*3/mm3      Eosinophils, Absolute 0.08 10*3/mm3      Basophils, Absolute 0.06 10*3/mm3      Immature Grans, Absolute 0.45 10*3/mm3      nRBC 0.0 /100 WBC     MRSA Screen Culture - Swab, Nares [[id number]]  (Normal) Collected:  01/15/19 2344    Specimen:  Swab from Nares Updated:  01/18/19 0555     MRSA SCREEN CX No Methicillin Resistant Staphylococcus aureus isolated    Acinetobacter Screen - Swab, Axilla, Right [[id number]]  (Normal) Collected:  01/15/19 2344    Specimen:  Swab from Axilla, Right Updated:  01/18/19 0553     ACINETOBACTER SCREEN CX No growth    Blood Culture - Blood, Arm, Left [[id number]] Collected:  01/15/19 1732    Specimen:  Blood from Arm, Left Updated:  01/17/19 4100      Blood Culture No growth at 2 days    Blood Culture - Blood, Arm, Right [[id number]] Collected:  01/15/19 1734    Specimen:  Blood from Arm, Right Updated:  01/17/19 1745     Blood Culture No growth at 2 days           Radiology:    Imaging Results (last 24 hours)     ** No results found for the last 24 hours. **          Medication Review:       atorvastatin 40 mg Oral Nightly   CefTRIAXone Sodium-Dextrose 2 g Intravenous Q24H   cilostazol 50 mg Oral BID   clopidogrel 75 mg Oral Daily   diclofenac 2 g Topical BID   enoxaparin 30 mg Subcutaneous Q24H   guaiFENesin 600 mg Oral Q12H   HYDROcodone-acetaminophen 1 tablet Oral Q6H   ipratropium-albuterol 3 mL Nebulization Q6H While Awake - RT   iron polysaccharides 150 mg Oral Daily   levothyroxine 112 mcg Oral Daily   magnesium oxide 400 mg Oral TID   megestrol acetate 400 mg Oral BID   metoprolol tartrate 12.5 mg Oral Q12H   miconazole nitrate  Topical Q24H   mupirocin  Topical Q12H   pantoprazole 40 mg Oral Daily   potassium chloride 20 mEq Oral Daily   QUEtiapine 25 mg Oral Q12H   sodium chloride 3 mL Intravenous Q12H   sodium chloride 1 g Oral TID With Meals   vancomycin 1,000 mg Intravenous Q48H         dextrose 5 % and sodium chloride 0.45 % 75 mL/hr Last Rate: 75 mL/hr (01/18/19 1032)   Pharmacy to dose vancomycin         Assessment/Plan     Problem List Items Addressed This Visit        Other    Abnormal chest x-ray - Primary      Other Visit Diagnoses     Peripheral vascular disease (CMS/HCC)        Bilateral pressure ulcer of feet        Relevant Medications    lidocaine (LIDODERM) 5 %          1.  Cellulitis of bilateral heel and lower leg, improved.  MRSA growing out.  2.  Bilateral pneumonia, asymptomatic  3.  Bilateral lower extremity chronic ulcerations  4.  Right great toe with dry gangrene  5.  Recent subdural hematoma  6.  Acute renal failure, resolved  7.  Progressive functional decline  8.  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   Coronary artery disease with medical management  10.  Hypothyroidism  11.  Iron deficiency anemia        Plan: DC Lovenox due to anemia, will place on oral iron.  Change antibiotic to oral doxycycline.  DC IV antibiotics.   Palliative care is meeting with the family today.  Hospice should be considered in this patient.  Increase Seroquel to 25 mg twice a day.  Check lab work in the a.m.  Anticipate patient could be sent back to nursing home tomorrow if lab work stable.    Mark Wood DO  01/18/19  1:46 PM

## 2019-01-18 NOTE — PROGRESS NOTES
Scheduled meeting with pt's granddaughter with Dr Liang and PC nurse @ 2pm.   Granddaughter did not arrive for meeting.  Pt's primary nurse, Haley PATRICIA, reported granddaughter visited pt earlier before 2p.  Annalise called Granddaughter and left voice message.    1630 Granddaughter called she had forgotten about meeting and will be on her way to hospital.

## 2019-01-18 NOTE — PLAN OF CARE
Problem: Patient Care Overview  Goal: Plan of Care Review  Outcome: Ongoing (interventions implemented as appropriate)   01/18/19 4079   Coping/Psychosocial   Plan of Care Reviewed With patient   Plan of Care Review   Progress improving       Problem: Fall Risk (Adult)  Goal: Absence of Fall  Outcome: Ongoing (interventions implemented as appropriate)      Problem: Wound (Includes Pressure Injury) (Adult)  Goal: Signs and Symptoms of Listed Potential Problems Will be Absent, Minimized or Managed (Wound)  Outcome: Ongoing (interventions implemented as appropriate)      Problem: Skin Injury Risk (Adult)  Goal: Skin Health and Integrity  Outcome: Ongoing (interventions implemented as appropriate)

## 2019-01-18 NOTE — PROGRESS NOTES
Dr Liang and PC nurse met with pt's graddaughter in pt's room to discuss Goals of Care.  Granddaughter confirmed DNR status.  She reviewed with Dr Liang pt's decline since Aug 2018  Dr Liang discussed whether her goal for pt was to continue with returning to hospital for treatment or to focus on comfort and treating illness at nursing facility.  Granddaughter was clear on desire for Comfort Measures for pt.  She verbalized realizing pt will not improve much and more interventions could result in more discomfort.  Dr Liang explained if Comfort is the goal, then Hospice would be appropriate.  Dr Liang explained Hospice services as well as the criteria for Inpt Hospice.   Granddaughter was agreeable to Hospice care at discharge and at this time for pt to return to [facility] with Hospice referral.  Dr Liang updated Dr Wood who reported plans to discharge pt tomorrow.  Granddaughter still present and informed of plan for discharge tomorrow.    Dr Liang reviewed with Granddaughter the MOST form.  MOST form signed, faxed to [facility] to be scanned into chart.  Original placed on chart.  Pt's primary nurse, Haley PATRICIA, updated on plan and MOST form.    Dr Liang spoke with [facility] to inform of plan.  Demographics and clinicals faxed to [facility] at [phone]

## 2019-01-19 NOTE — PLAN OF CARE
Problem: Patient Care Overview  Goal: Plan of Care Review  Outcome: Ongoing (interventions implemented as appropriate)   01/19/19 0358   Coping/Psychosocial   Plan of Care Reviewed With patient   OTHER   Outcome Summary Patients remains confused. Repeated calling out, argumentive with staff, and resistive to care. PRNs given around the clock.    Plan of Care Review   Progress declining       Problem: Fall Risk (Adult)  Goal: Identify Related Risk Factors and Signs and Symptoms  Outcome: Ongoing (interventions implemented as appropriate)   01/19/19 0358   Fall Risk (Adult)   Related Risk Factors (Fall Risk) history of falls;confusion/agitation;fatigue/slow reaction;sleep pattern alteration;slippery/uneven surfaces;environment unfamiliar;gait/mobility problems   Signs and Symptoms (Fall Risk) presence of risk factors       Problem: Wound (Includes Pressure Injury) (Adult)  Goal: Signs and Symptoms of Listed Potential Problems Will be Absent, Minimized or Managed (Wound)  Outcome: Ongoing (interventions implemented as appropriate)   01/18/19 1516   Goal/Outcome Evaluation   Problems Assessed (Wound) all   Problems Present (Wound) delayed wound healing;infection       Problem: Skin Injury Risk (Adult)  Goal: Identify Related Risk Factors and Signs and Symptoms  Outcome: Outcome(s) achieved Date Met: 01/19/19 01/17/19 1203   Skin Injury Risk (Adult)   Related Risk Factors (Skin Injury Risk) advanced age;edema;fluid intake inadequate;medical devices;mechanical forces;mobility impaired;moisture;nutritional deficiencies;skeletal deformities;tissue perfusion altered     Goal: Skin Health and Integrity  Outcome: Ongoing (interventions implemented as appropriate)   01/19/19 1488   Skin Injury Risk (Adult)   Skin Health and Integrity making progress toward outcome

## 2019-01-19 NOTE — NURSING NOTE
Patient has been extremely restless throughout the night and did not sleep. She refused to take her scheduled medications. PRN meds for agitation were given around-the-clock and adjusted as needed by Dr. Groves. Despite being repositioned numerous times patient would end up horizontal in bed, kicking her legs at the side rails.  Dressing on left leg become loose and leg started to bleed. Leg was re-wrapped as best as possible while patient was uncooperative and thrashing about. Abrasion noted on right knee was covered with a mepilex.

## 2019-01-19 NOTE — DISCHARGE SUMMARY
[facility]   DISCHARGE SUMMARY      Name:  Deidre Bobby   Age:  89 y.o.  Sex:  female  :  1925  MRN:  [id number]   Visit Number:  [id number]  Primary Care Physician:  Rob Pantoja DO  Date of Discharge:  2019  Admission Date:  1/15/2019      Discharge Diagnosis:     1.  Cellulitis of bilateral heel and lower leg, improved.  MRSA growing out.  2.  Bilateral pneumonia, asymptomatic  3.  Bilateral lower extremity chronic ulcerations  4.  Right great toe with dry gangrene  5.  Recent subdural hematoma  6.  Acute renal failure, resolved  7.  Progressive functional decline  8.  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  Coronary artery disease with medical management  10.  Hypothyroidism  11.  Iron deficiency anemia  12.  Dementia with behavioral disturbance        Active Hospital Problems    Diagnosis Date Noted   • Abnormal chest x-ray [R93.89] 01/15/2019   • Cellulitis of right lower extremity [L03.115] 01/15/2019      Resolved Hospital Problems   No resolved problems to display.         Presenting Problem/History of Present Illness:    Pneumonia of right lower lobe due to infectious organism (CMS/Formerly [facility]) [J18.1]         Hospital Course:    Patient is a 89-year-old lady with macular degeneration, severe hearing loss, coronary artery disease, congestive heart failure who presented emergency room from nursing facility after abnormal imaging found at this facility.  She had progressive lower extremity pain on the right for the past few weeks.  She has had functional decline over the past 6 months.  She is bedridden with progressive infections and dementia with poor daily function.  She had a bilateral lower extremity arterial duplex which showed moderate peripheral arterial disease and occluded right posterior tibial artery without visualization of the dorsalis pedis arteries.  She has wounds on bilateral lower extremities.  Dr. Stevens was consulted and did  see and evaluate the patient.  He felt the patient had a reasonable Doppler in both pedal pulses bilaterally.  She does have dry gangrene on the right.  He recommended treating medically with Plavix and Pletal and no invasive workup planned.  She was admitted and placed on vancomycin.  It was also felt she may have bilateral pneumonia.  She was placed on Rocephin as well.  She is improved.  Is currently on room air.   Her WBC count has improved.  Cellulitis appears to have improved.  CT showed no evidence of osteomyelitis.  Wound cultures have grown out MRSA.  This is sensitive to doxycycline.  Discontinued Rocephin and vancomycin and place the patient on oral doxycycline.  Continue the doxycycline for one week.     Granddaughter is power of .   She met with palliative care .  Granddaughter was agreeable to Hospice care at discharge and at this time for pt to return to [facility] with Hospice referral.  Patient will be returning there today.        Procedures Performed:           Consults:     Consults     Date and Time Order Name Status Description    1/15/2019 2317 Inpatient Cardiology Consult            Pertinent Test Results:     Lab Results (all)     Procedure Component Value Units Date/Time    CBC & Differential [[id number]] Collected:  01/19/19 0654    Specimen:  Blood Updated:  01/19/19 0721    Narrative:       The following orders were created for panel order CBC & Differential.  Procedure                               Abnormality         Status                     ---------                               -----------         ------                     CBC Auto Differential[[id number]]        Abnormal            Final result                 Please view results for these tests on the individual orders.    CBC Auto Differential [[id number]]  (Abnormal) Collected:  01/19/19 0654    Specimen:  Blood Updated:  01/19/19 0721     WBC 17.83 10*3/mm3      RBC 2.81 10*6/mm3       Hemoglobin 8.9 g/dL      Hematocrit 26.9 %      MCV 95.7 fL      MCH 31.7 pg      MCHC 33.1 g/dL      RDW 12.7 %      RDW-SD 44.0 fl      MPV 9.9 fL      Platelets 534 10*3/mm3      Neutrophil % 75.8 %      Lymphocyte % 12.1 %      Monocyte % 8.2 %      Eosinophil % 1.4 %      Basophil % 0.4 %      Immature Grans % 2.1 %      Neutrophils, Absolute 13.50 10*3/mm3      Lymphocytes, Absolute 2.16 10*3/mm3      Monocytes, Absolute 1.46 10*3/mm3      Eosinophils, Absolute 0.25 10*3/mm3      Basophils, Absolute 0.08 10*3/mm3      Immature Grans, Absolute 0.38 10*3/mm3      nRBC 0.0 /100 WBC     Renal Function Panel [[id number]]  (Abnormal) Collected:  01/19/19 0654    Specimen:  Blood Updated:  01/19/19 0719     Glucose 86 mg/dL      BUN 14 mg/dL      Creatinine 1.00 mg/dL      Sodium 133 mmol/L      Potassium 4.8 mmol/L      Chloride 103 mmol/L      CO2 20.0 mmol/L      Calcium 8.9 mg/dL      Albumin 3.50 g/dL      Phosphorus 2.8 mg/dL      Anion Gap 14.8 mmol/L      BUN/Creatinine Ratio 14.0     eGFR Non African Amer 52 mL/min/1.73     Narrative:       The MDRD GFR formula is only valid for adults with stable renal function between ages 18 and 70.    Magnesium [[id number]]  (Normal) Collected:  01/19/19 0654    Specimen:  Blood Updated:  01/19/19 0719     Magnesium 2.1 mg/dL     C-reactive Protein [[id number]]  (Abnormal) Collected:  01/19/19 0654    Specimen:  Blood Updated:  01/19/19 0719     C-Reactive Protein 3.30 mg/dL     Blood Culture - Blood, Arm, Left [[id number]] Collected:  01/15/19 1732    Specimen:  Blood from Arm, Left Updated:  01/18/19 1745     Blood Culture No growth at 3 days    Blood Culture - Blood, Arm, Right [[id number]] Collected:  01/15/19 1734    Specimen:  Blood from Arm, Right Updated:  01/18/19 1745     Blood Culture No growth at 3 days    C-reactive Protein [[id number]]  (Abnormal) Collected:  01/18/19 0543    Specimen:  Blood Updated:  01/18/19 1408     C-Reactive Protein 3.80 mg/dL     Wound  Culture - Wound, Leg, Left [[id number]]  (Abnormal)  (Susceptibility) Collected:  01/15/19 2344    Specimen:  Wound from Leg, Left Updated:  01/18/19 0836     Wound Culture Light growth (2+) Staphylococcus aureus, MRSA     Comment:   Methicillin resistant Staphylococcus aureus, Patient may be an isolation risk.       Susceptibility      Staphylococcus aureus, MRSA     DAVIS     Amoxicillin + Clavulanate Resistant     Ampicillin Resistant     Ampicillin + Sulbactam Resistant     Cefazolin Resistant     Ciprofloxacin Resistant     Clindamycin Resistant     Daptomycin Susceptible     Erythromycin Resistant     Gentamicin Susceptible     Levofloxacin Resistant     Linezolid Susceptible     Moxifloxacin Resistant     Oxacillin Resistant     Penicillin G Resistant     Quinupristin + Dalfopristin Susceptible     Rifampin Susceptible     Tetracycline Susceptible     Trimethoprim + Sulfamethoxazole Susceptible     Vancomycin Susceptible                    Iron Profile [[id number]]  (Abnormal) Collected:  01/18/19 0543    Specimen:  Blood Updated:  01/18/19 0807     Iron 16 mcg/dL      TIBC 192 mcg/dL      Iron Saturation 8 %     Basic Metabolic Panel [[id number]]  (Abnormal) Collected:  01/18/19 0543    Specimen:  Blood Updated:  01/18/19 0648     Glucose 121 mg/dL      BUN 15 mg/dL      Creatinine 0.90 mg/dL      Sodium 132 mmol/L      Potassium 4.3 mmol/L      Chloride 103 mmol/L      CO2 20.0 mmol/L      Calcium 8.3 mg/dL      eGFR Non African Amer 58 mL/min/1.73      BUN/Creatinine Ratio 16.7     Anion Gap 13.3 mmol/L     Narrative:       The MDRD GFR formula is only valid for adults with stable renal function between ages 18 and 70.    CBC & Differential [[id number]] Collected:  01/18/19 0543    Specimen:  Blood Updated:  01/18/19 0647    Narrative:       The following orders were created for panel order CBC & Differential.  Procedure                               Abnormality         Status                     ---------                                -----------         ------                     CBC Auto Differential[[id number]]        Abnormal            Final result                 Please view results for these tests on the individual orders.    CBC Auto Differential [[id number]]  (Abnormal) Collected:  01/18/19 0543    Specimen:  Blood Updated:  01/18/19 0647     WBC 13.75 10*3/mm3      RBC 2.34 10*6/mm3      Hemoglobin 7.5 g/dL      Hematocrit 23.8 %      .7 fL      MCH 32.1 pg      MCHC 31.5 g/dL      RDW 13.1 %      RDW-SD 47.9 fl      MPV 9.9 fL      Platelets 437 10*3/mm3      Neutrophil % 76.7 %      Lymphocyte % 11.5 %      Monocyte % 7.5 %      Eosinophil % 0.6 %      Basophil % 0.4 %      Immature Grans % 3.3 %      Neutrophils, Absolute 10.55 10*3/mm3      Lymphocytes, Absolute 1.58 10*3/mm3      Monocytes, Absolute 1.03 10*3/mm3      Eosinophils, Absolute 0.08 10*3/mm3      Basophils, Absolute 0.06 10*3/mm3      Immature Grans, Absolute 0.45 10*3/mm3      nRBC 0.0 /100 WBC     MRSA Screen Culture - Swab, Nares [[id number]]  (Normal) Collected:  01/15/19 2344    Specimen:  Swab from Nares Updated:  01/18/19 0555     MRSA SCREEN CX No Methicillin Resistant Staphylococcus aureus isolated    Acinetobacter Screen - Swab, Axilla, Right [[id number]]  (Normal) Collected:  01/15/19 2344    Specimen:  Swab from Axilla, Right Updated:  01/18/19 0553     ACINETOBACTER SCREEN CX No growth    Basic Metabolic Panel [[id number]]  (Abnormal) Collected:  01/17/19 0555    Specimen:  Blood Updated:  01/17/19 0813     Glucose 101 mg/dL      BUN 19 mg/dL      Creatinine 1.00 mg/dL      Sodium 134 mmol/L      Potassium 4.2 mmol/L      Chloride 106 mmol/L      CO2 20.0 mmol/L      Calcium 8.4 mg/dL      eGFR Non African Amer 52 mL/min/1.73      BUN/Creatinine Ratio 19.0     Anion Gap 12.2 mmol/L     Narrative:       The MDRD GFR formula is only valid for adults with stable renal function between ages 18 and 70.    CBC & Differential [[id number]]  Collected:  01/17/19 0555    Specimen:  Blood Updated:  01/17/19 0811    Narrative:       The following orders were created for panel order CBC & Differential.  Procedure                               Abnormality         Status                     ---------                               -----------         ------                     CBC Auto Differential[[id number]]        Abnormal            Final result                 Please view results for these tests on the individual orders.    CBC Auto Differential [[id number]]  (Abnormal) Collected:  01/17/19 0555    Specimen:  Blood Updated:  01/17/19 0811     WBC 15.22 10*3/mm3      RBC 2.51 10*6/mm3      Hemoglobin 8.0 g/dL      Hematocrit 24.9 %      MCV 99.2 fL      MCH 31.9 pg      MCHC 32.1 g/dL      RDW 13.2 %      RDW-SD 47.5 fl      MPV 10.4 fL      Platelets 466 10*3/mm3      Neutrophil % 76.9 %      Lymphocyte % 10.2 %      Monocyte % 7.6 %      Eosinophil % 2.3 %      Basophil % 0.5 %      Immature Grans % 2.5 %      Neutrophils, Absolute 11.70 10*3/mm3      Lymphocytes, Absolute 1.55 10*3/mm3      Monocytes, Absolute 1.16 10*3/mm3      Eosinophils, Absolute 0.35 10*3/mm3      Basophils, Absolute 0.08 10*3/mm3      Immature Grans, Absolute 0.38 10*3/mm3      nRBC 0.0 /100 WBC     Vancomycin, Trough [[id number]]  (Normal) Collected:  01/17/19 0555    Specimen:  Blood Updated:  01/17/19 0647     Vancomycin Trough 5.62 mcg/mL     TSH [[id number]]  (Abnormal) Collected:  01/16/19 0624    Specimen:  Blood Updated:  01/16/19 0805     TSH 8.340 mIU/mL     CBC Auto Differential [[id number]]  (Abnormal) Collected:  01/16/19 0624    Specimen:  Blood Updated:  01/16/19 0705     WBC 17.74 10*3/mm3      RBC 2.49 10*6/mm3      Hemoglobin 7.8 g/dL      Hematocrit 24.8 %      MCV 99.6 fL      MCH 31.3 pg      MCHC 31.5 g/dL      RDW 13.2 %      RDW-SD 47.9 fl      MPV 9.8 fL      Platelets 484 10*3/mm3      Neutrophil % 80.1 %      Lymphocyte % 9.4 %      Monocyte % 5.5 %       Eosinophil % 1.6 %      Basophil % 0.7 %      Immature Grans % 2.7 %      Neutrophils, Absolute 14.20 10*3/mm3      Lymphocytes, Absolute 1.67 10*3/mm3      Monocytes, Absolute 0.98 10*3/mm3      Eosinophils, Absolute 0.29 10*3/mm3      Basophils, Absolute 0.12 10*3/mm3      Immature Grans, Absolute 0.48 10*3/mm3      nRBC 0.0 /100 WBC     Basic Metabolic Panel [[id number]]  (Abnormal) Collected:  01/16/19 0624    Specimen:  Blood Updated:  01/16/19 0648     Glucose 88 mg/dL      BUN 25 mg/dL      Creatinine 1.10 mg/dL      Sodium 134 mmol/L      Potassium 4.1 mmol/L      Chloride 105 mmol/L      CO2 20.0 mmol/L      Calcium 8.2 mg/dL      eGFR Non African Amer 46 mL/min/1.73      BUN/Creatinine Ratio 22.7     Anion Gap 13.1 mmol/L     Narrative:       The MDRD GFR formula is only valid for adults with stable renal function between ages 18 and 70.    Magnesium [[id number]]  (Normal) Collected:  01/16/19 0624    Specimen:  Blood Updated:  01/16/19 0648     Magnesium 1.9 mg/dL     VRE Culture - Swab, Per Rectum [[id number]] Collected:  01/15/19 2344    Specimen:  Swab from Per Rectum Updated:  01/15/19 2344    Urinalysis, Microscopic Only - Urine, Catheter [[id number]]  (Abnormal) Collected:  01/15/19 1627    Specimen:  Urine, Catheter Updated:  01/15/19 1653     RBC, UA None Seen /HPF      WBC, UA 3-5 /HPF      Bacteria, UA 1+ /HPF      Squamous Epithelial Cells, UA 0-2 /HPF      Hyaline Casts, UA None Seen /LPF      Methodology Manual Light Microscopy    Urinalysis With Microscopic If Indicated (No Culture) - Urine, Catheter [[id number]]  (Abnormal) Collected:  01/15/19 1627    Specimen:  Urine, Catheter Updated:  01/15/19 1645     Color, UA Yellow     Appearance, UA Slightly Cloudy     pH, UA 6.0     Specific Gravity, UA 1.015     Glucose, UA Negative     Ketones, UA Negative     Bilirubin, UA Negative     Blood, UA Negative     Protein, UA Negative     Leuk Esterase, UA Negative     Nitrite, UA Positive      Urobilinogen, UA 0.2 E.U./dL    Protime-INR [[id number]]  (Abnormal) Collected:  01/15/19 1535    Specimen:  Blood Updated:  01/15/19 1617     Protime 15.6 Seconds      INR 1.41    aPTT [[id number]]  (Normal) Collected:  01/15/19 1535    Specimen:  Blood Updated:  01/15/19 1617     PTT 25.0 seconds     Comprehensive Metabolic Panel [[id number]]  (Abnormal) Collected:  01/15/19 1535    Specimen:  Blood Updated:  01/15/19 1614     Glucose 106 mg/dL      BUN 30 mg/dL      Creatinine 1.50 mg/dL      Sodium 132 mmol/L      Potassium 5.1 mmol/L      Chloride 103 mmol/L      CO2 18.0 mmol/L      Calcium 9.1 mg/dL      Total Protein 6.6 g/dL      Albumin 3.70 g/dL      ALT (SGPT) 23 U/L      AST (SGOT) 22 U/L      Alkaline Phosphatase 77 U/L      Total Bilirubin 0.3 mg/dL      eGFR Non African Amer 32 mL/min/1.73      Globulin 2.9 gm/dL      A/G Ratio 1.3 g/dL      BUN/Creatinine Ratio 20.0     Anion Gap 16.1 mmol/L     Narrative:       The MDRD GFR formula is only valid for adults with stable renal function between ages 18 and 70.    CBC & Differential [[id number]] Collected:  01/15/19 1535    Specimen:  Blood Updated:  01/15/19 1546    Narrative:       The following orders were created for panel order CBC & Differential.  Procedure                               Abnormality         Status                     ---------                               -----------         ------                     CBC Auto Differential[[id number]]        Abnormal            Final result                 Please view results for these tests on the individual orders.    CBC Auto Differential [[id number]]  (Abnormal) Collected:  01/15/19 1535    Specimen:  Blood Updated:  01/15/19 1546     WBC 20.52 10*3/mm3      RBC 2.99 10*6/mm3      Hemoglobin 9.7 g/dL      Hematocrit 30.2 %      .0 fL      MCH 32.4 pg      MCHC 32.1 g/dL      RDW 13.2 %      RDW-SD 49.1 fl      MPV 9.5 fL      Platelets 569 10*3/mm3      Neutrophil % 79.4 %      Lymphocyte %  9.1 %      Monocyte % 6.4 %      Eosinophil % 0.7 %      Basophil % 0.6 %      Immature Grans % 3.8 %      Neutrophils, Absolute 16.31 10*3/mm3      Lymphocytes, Absolute 1.86 10*3/mm3      Monocytes, Absolute 1.31 10*3/mm3      Eosinophils, Absolute 0.15 10*3/mm3      Basophils, Absolute 0.12 10*3/mm3      Immature Grans, Absolute 0.77 10*3/mm3      nRBC 0.0 /100 WBC           Imaging Results (all)     Procedure Component Value Units Date/Time    CT Lower Extremity Right Without Contrast [[id number]] Collected:  01/16/19 1250     Updated:  01/16/19 1255    Narrative:       CT RIGHT FOOT     INDICATION: Foot swelling. Diabetes. Evaluate for osteomyelitis.     TECHNIQUE: Axial imaging through the right foot without contrast or  comparison. Coronal and sagittal reformatted images were also reviewed.  This study was performed with techniques to keep radiation doses as low  as reasonably achievable, (ALARA).     FINDINGS: Mild patient motion related artifact limits evaluation.  Diffuse osteopenia. Probable old fracture deformity of the distal aspect  of the proximal 4th phalanx. There is also slight deformity of the 2nd  middle phalanx favored to be related to chronic findings possibly from  degenerative or posttraumatic changes. Subacute or chronic fracture of  the distal metaphysis of the 5th metatarsal. Moderate multifocal  degenerative changes. Possible old fracture of the distal fibula. No  obvious areas of localized bony destruction. Vascular calcifications and  mild diffuse soft tissue edema but no localized fluid collection  identified. Near the plantar aspect of the 5th metatarsal head is a 2 mm  density which could represent a soft tissue calcification, but is  concerning for a tiny foreign body. Small Achilles calcaneal spurs.       Impression:          1. Mild soft tissue edema.     2. Multiple fractures which are favored to be old as detailed above.     3. Degenerative changes.     4. Soft tissue  calcification versus tiny foreign body along the plantar  surface of the 5th metatarsal head.     5. No obvious CT evidence of osteomyelitis. However, MRI would be the  study of choice for optimal assessment. If the patient is unable to  undergo MRI, 3-phase bone scan would be next most helpful evaluation.     This report was finalized on 1/16/2019 12:53 PM by Perfecto Diaz MD.    US Renal Limited [[id number]] Collected:  01/16/19 1249     Updated:  01/16/19 1252    Narrative:       PROCEDURE: US RENAL LIMITED-     HISTORY: Acute kidney injury; J18.1-Lobar pneumonia, unspecified  organism; I73.9-Peripheral vascular disease, unspecified;  L89.899-Pressure ulcer of other site, unspecified stage     PROCEDURE: Ultrasound images of the kidneys were obtained.     FINDINGS:  Limited images of the liver parenchyma demonstrate normal   echogenicity.       The right kidney measures 9.9 cm in length .  Diffuse renal cortical  thinning.  There is no hydronephrosis.     The left kidney measures 9.1 cm in length .  Diffuse renal cortical  thinning.  There is no hydronephrosis.       Impression:       No acute renal abnormality identified. Renal cortical  thinning suggestive of chronic medical renal disease.     This report was finalized on 1/16/2019 12:50 PM by Perfecto Diaz MD.    CT Lower Extremity Left Without Contrast [[id number]] Collected:  01/16/19 1247     Updated:  01/16/19 1251    Narrative:       CT LEFT FOOT     INDICATION: Foot swelling. History of diabetes. Evaluate for  osteomyelitis.     TECHNIQUE: Axial imaging through the left foot without contrast or  comparison. Coronal and sagittal reformatted images. This study was  performed with techniques to keep radiation doses as low as reasonably  achievable, (ALARA).     FINDINGS: Some of the images are degraded by patient motion. Diffuse  osteopenia. Moderate multifocal degenerative changes. Tiny Achilles  calcaneal spur. Bipartite appearance of the medial sesamoid. No  distinct  fracture or bony destruction identified. Old appearing fracture  deformity of the 5th metatarsal. Probable mild diffuse soft tissue  edema. No obvious localized fluid collection or abscess.       Impression:          1. Soft tissue edema.     2. Chronic appearing findings.     3. No obvious CT evidence of osteomyelitis. However, MRI would be the  study of choice for optimal evaluation of osteomyelitis. If the patient  is unable to undergo MRI and there is persistent clinical concern,  3-phase bone scan would be the most useful next assessment.     This report was finalized on 1/16/2019 12:49 PM by Perfecto Diaz MD.    CT Chest Without Contrast [[id number]] Collected:  01/16/19 1241     Updated:  01/16/19 1246    Narrative:       CT CHEST     INDICATION: Immunocompromised patient with acute respiratory illness.     TECHNIQUE: Axial imaging through the chest without intravenous contrast  or previous chest CT. This study was performed with techniques to keep  radiation doses as low as reasonably achievable, (ALARA).     FINDINGS: Evaluation is degraded by the absence of intravenous contrast.  Heart size is normal. Small hiatal hernia. There are some densities  within this region which may be intraluminal and could represent some  retained ingested densities or contrast. There is also some minimal  thickening of the distal esophagus. No pericardial effusion. Moderate  vascular calcifications. No significant pleural effusion. Visualized  portions of the upper abdomen demonstrate some tiny nonobstructing renal  stones and/or vascular calcifications.     No pneumothorax. Multiple opacities in the posterior aspect of both lung  bases may represent areas of atelectasis and/or scarring. Developing  infiltrates from pneumonia are difficult to exclude. Moderate  degenerative changes within the spine and shoulders.       Impression:          1. Opacities in the lung bases which may represent atelectasis and/or  scarring  but developing infiltrates for pneumonia are not excluded.  Follow-up may be useful.     2. Small hiatal hernia with some thickening of the distal esophagus  which may be related to mild esophagitis. Mass difficult to definitively  exclude and could be further assessed with endoscopy if indicated.     This report was finalized on 1/16/2019 12:44 PM by Perfecto Diaz MD.    XR Chest 1 View [[id number]] Collected:  01/15/19 1824     Updated:  01/15/19 1825    Narrative:       FINAL REPORT    TECHNIQUE:  An AP portable view of the chest was obtained.    CLINICAL HISTORY:  SOA.    FINDINGS:  There is incidental calcification of costal cartilage projecting  over the right lung.  The lungs are clear. The heart and  vasculature are unremarkable. There is no pleural disease,  adenopathy, or significant osseous abnormality.      Impression:       Unremarkable.    Authenticated by Nikolai Pereyra M.D. on 01/15/2019 06:24:42 PM    XR Foot 3+ View Bilateral [[id number]] Collected:  01/15/19 1524     Updated:  01/15/19 1537    Narrative:       BILATERAL FEET     INDICATION: Heel wound.     FINDINGS: Three views of the left foot demonstrate diffuse osteopenia.  Old appearing fracture deformity of the midshaft of the 5th metatarsal.  There are also fracture deformities of the distal 3rd and 4th  metatarsals, favored to be old as well. Small Achilles calcaneal spur.  Vascular calcifications. Bipartite appearance of the medial sesamoid.  Moderate multifocal degenerative changes. No obvious acute fracture or  areas of bony destruction.     Three views of the right foot also demonstrate diffuse osteopenia and  vascular calcifications. Small plantar and Achilles calcaneal spurs.  Subacute to old fracture of the distal 5th metatarsal. There is also a  probable nondisplaced fracture of the proximal 3rd metatarsal which is  also favored to likely be subacute or old but should be correlated  clinically. Moderate multifocal degenerative changes.  "Deformity of the  distal aspect of the proximal 4th phalanx is nonspecific. The appearance  may be related to a previous fracture or osteomyelitis. Other etiologies  include psoriatic or Jose Manuel's disease thought less likely.       Impression:       1. Prominent chronic appearing findings.  2. Fracture deformities are favored to be predominantly chronic. Some in  the right foot may be subacute. Acute process thought less likely but  should be correlated clinically.  3. If symptoms persist, or if there is concern for osteomyelitis,  consider MRI.     This report was finalized on 1/15/2019 3:35 PM by Perfecto Diaz MD.          Condition on Discharge:      Poor prognosis    Vital Signs:    /98 (BP Location: Right arm, Patient Position: Lying)   Pulse 84   Temp 98 °F (36.7 °C) (Oral)   Resp 16   Ht 165.1 cm (65\")   Wt 50.9 kg (112 lb 4.8 oz)   SpO2 96%   BMI 18.69 kg/m²     Physical Exam:      General Appearance:    Arousable but somnolent    Head:    Normocephalic, without obvious abnormality, atraumatic   Eyes:            Lids and lashes normal, conjunctivae and sclerae normal, no   icterus, no pallor, corneas clear, PERRLA   Ears:    Ears appear intact with no abnormalities noted   Throat:   No oral lesions, no thrush, oral mucosa moist   Neck:   No adenopathy, supple, trachea midline, no thyromegaly, no     carotid bruit, no JVD   Back:     No kyphosis present, no scoliosis present, no skin lesions,       erythema or scars, no tenderness to percussion or                   palpation,   range of motion normal   Lungs:     Clear to auscultation,respirations regular, even and                   unlabored    Heart:    Regular rhythm and normal rate, normal S1 and S2, no            murmur, no gallop, no rub, no click   Breast Exam:    Deferred   Abdomen:     Normal bowel sounds, no masses, no organomegaly, soft        non-tender, non-distended, no guarding, no rebound                 tenderness   Genitalia:    " Deferred   Extremities:   Muscle wasting noted in extremities.  Gangrene noted in her great toe.  Cellulitis is improved.     Pulses:   Pulses palpable and equal bilaterally   Skin:   No bleeding, bruising or rash   Lymph nodes:   No palpable adenopathy   Neurologic:   Cranial nerves 2 - 12 grossly intact, sensation intact, DTR        present and equal bilaterally       Discharge Disposition:    Skilled Nursing Facility (DC - External)    Discharge Medication:       Discharge Medications      New Medications      Instructions Start Date   atorvastatin 40 MG tablet  Commonly known as:  LIPITOR   40 mg, Oral, Nightly      cilostazol 50 MG tablet  Commonly known as:  PLETAL   50 mg, Oral, 2 Times Daily      clopidogrel 75 MG tablet  Commonly known as:  PLAVIX   75 mg, Oral, Daily      doxycycline 100 MG capsule  Commonly known as:  MONODOX   100 mg, Oral, Every 12 Hours Scheduled      guaiFENesin 600 MG 12 hr tablet  Commonly known as:  MUCINEX   600 mg, Oral, Every 12 Hours Scheduled      iron polysaccharides 150 MG capsule  Commonly known as:  NIFEREX   150 mg, Oral, Daily      miconazole nitrate 2 % ointment ointment  Commonly known as:  ALOE VESTA   Topical, Every 24 Hours Scheduled      mupirocin 2 % ointment  Commonly known as:  BACTROBAN   Topical, Every 12 Hours Scheduled         Changes to Medications      Instructions Start Date   diclofenac 1 % gel gel  Commonly known as:  VOLTAREN  What changed:  additional instructions   2 g, Topical, 2 Times Daily      metoprolol tartrate 25 MG tablet  Commonly known as:  LOPRESSOR  What changed:    · how much to take  · when to take this   12.5 mg, Oral, Every 12 Hours Scheduled      polyethylene glycol packet  Commonly known as:  MIRALAX  What changed:  when to take this   17 g, Oral, Daily PRN         Continue These Medications      Instructions Start Date   acetaminophen 500 MG tablet  Commonly known as:  TYLENOL   500 mg, Oral, Every 6 Hours PRN      aspirin 81 MG EC  tablet   81 mg, Oral, Daily      docusate sodium 250 MG capsule  Commonly known as:  COLACE   250 mg, Oral, 2 Times Daily PRN      HYDROcodone-acetaminophen 5-325 MG per tablet  Commonly known as:  LORTAB   1 tablet, Oral, Every 6 Hours      ipratropium-albuterol 0.5-2.5 mg/3 ml nebulizer  Commonly known as:  DUO-NEB   3 mL, Nebulization, Every 4 Hours PRN      levothyroxine 112 MCG tablet  Commonly known as:  SYNTHROID, LEVOTHROID   112 mcg, Oral, Daily      losartan 25 MG tablet  Commonly known as:  COZAAR   25 mg, Oral, Every 24 Hours Scheduled      magnesium oxide 400 (241.3 Mg) MG tablet tablet  Commonly known as:  MAGOX   400 mg, Oral, 3 Times Daily      megestrol acetate 400 MG/10ML suspension oral suspension  Commonly known as:  MEGACE   400 mg, Oral, 2 Times Daily      MULTIPLE VITAMINS PO   1 tablet, Oral, Daily      oxazepam 10 MG capsule  Commonly known as:  SERAX   10 mg, Oral, Nightly PRN      pantoprazole 40 MG EC tablet  Commonly known as:  PROTONIX   40 mg, Oral, Daily      potassium chloride 20 MEQ CR tablet  Commonly known as:  K-DUR,KLOR-CON   20 mEq, Oral, Daily, Related to hypokalemia       QUEtiapine 25 MG tablet  Commonly known as:  SEROquel   25 mg, Oral, Nightly      RA VITAMIN B-12 1000 MCG tablet controlled-release  Generic drug:  Cyanocobalamin ER   Oral, Daily      sodium chloride 1 g tablet   1 g, Oral, 3 Times Daily With Meals      tetracaine 0.5 % ophthalmic solution  Commonly known as:  ALTACAINE   1 drop, Both Eyes, As Needed         Stop These Medications    furosemide 20 MG tablet  Commonly known as:  LASIX     hydrocortisone 1 % cream     lidocaine 5 %  Commonly known as:  LIDODERM            Discharge Diet:     Diet Instructions     Diet: Regular      Discharge Diet:  Regular          Activity at Discharge:     Activity Instructions     Activity as Tolerated            Follow-up Appointments:    No future appointments.      Test Results Pending at Discharge:     Order Current  Status    VRE Culture - Swab, Per Rectum In process    Blood Culture - Blood, Arm, Left Preliminary result    Blood Culture - Blood, Arm, Right Preliminary result             Mark Wood DO  01/19/19  11:13 AM

## 2019-01-19 NOTE — PROGRESS NOTES
Case Management Discharge Note    Final Note: [facility]     Destination - Selection Complete      Service Provider Request Status Selected Services Address Phone Number Fax Number    [facility] Selected Skilled Nursing [address] [address] [phone]      Durable Medical Equipment      No service has been selected for the patient.      Dialysis/Infusion      No service has been selected for the patient.      Home Medical Care      No service has been selected for the patient.      Community Resources      No service has been selected for the patient.        Ambulance: [facility]    Final Discharge Disposition Code: 03 - skilled nursing facility (SNF)

## 2019-01-19 NOTE — PLAN OF CARE
Problem: Patient Care Overview  Goal: Plan of Care Review  Outcome: Ongoing (interventions implemented as appropriate)   01/19/19 5312   Coping/Psychosocial   Plan of Care Reviewed With patient;durable power of    Plan of Care Review   Progress improving     Goal: Individualization and Mutuality  Outcome: Ongoing (interventions implemented as appropriate)    Goal: Discharge Needs Assessment  Outcome: Ongoing (interventions implemented as appropriate)    Goal: Interprofessional Rounds/Family Conf  Outcome: Ongoing (interventions implemented as appropriate)      Problem: Fall Risk (Adult)  Goal: Identify Related Risk Factors and Signs and Symptoms  Outcome: Ongoing (interventions implemented as appropriate)    Goal: Absence of Fall  Outcome: Ongoing (interventions implemented as appropriate)      Problem: Wound (Includes Pressure Injury) (Adult)  Goal: Signs and Symptoms of Listed Potential Problems Will be Absent, Minimized or Managed (Wound)  Outcome: Ongoing (interventions implemented as appropriate)      Problem: Skin Injury Risk (Adult)  Goal: Skin Health and Integrity  Outcome: Ongoing (interventions implemented as appropriate)

## 2019-01-22 NOTE — PROGRESS NOTES
Continued Stay Note   [facility]     Patient Name: Deidre Bobby  MRN: [id number]  Today's Date: 1/22/2019    Admit Date: 1/15/2019    Discharge Plan     Row Name 01/22/19 1321       Plan    Plan Comments  F/U with [facility] re pt's return status.  Per Karlene, pt was returned as skilled, but was changed to Hospice Monday, 1/21/19 because she couldn't participate.  Per Karlene, they will be billing for 2 days under pt's skilled benefits.        Discharge Codes    No documentation.       Expected Discharge Date and Time     Expected Discharge Date Expected Discharge Time    Jan 19, 2019             Juana Segovia

## 2019-02-19 NOTE — ED NOTES
Updated nurse from [facility].     Candace Rainey, HARSHAD  10/20/18 2056     Spiral Flap Text: The defect edges were debeveled with a #15 scalpel blade.  Given the location of the defect, shape of the defect and the proximity to free margins a spiral flap was deemed most appropriate.  Using a sterile surgical marker, an appropriate rotation flap was drawn incorporating the defect and placing the expected incisions within the relaxed skin tension lines where possible. The area thus outlined was incised deep to adipose tissue with a #15 scalpel blade.  The skin margins were undermined to an appropriate distance in all directions utilizing iris scissors.

## 2019-04-03 PROBLEM — F01.50 MIXED VASCULAR AND NEURODEGENERATIVE DEMENTIA WITHOUT BEHAVIORAL DISTURBANCE (HCC): Status: ACTIVE | Noted: 2019-04-03
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
